# Patient Record
Sex: FEMALE | Race: WHITE | NOT HISPANIC OR LATINO | Employment: FULL TIME | URBAN - NONMETROPOLITAN AREA
[De-identification: names, ages, dates, MRNs, and addresses within clinical notes are randomized per-mention and may not be internally consistent; named-entity substitution may affect disease eponyms.]

---

## 2017-01-19 ENCOUNTER — TRANSFERRED RECORDS (OUTPATIENT)
Dept: HEALTH INFORMATION MANAGEMENT | Facility: HOSPITAL | Age: 42
End: 2017-01-19

## 2017-02-12 ENCOUNTER — HOSPITAL ENCOUNTER (EMERGENCY)
Facility: HOSPITAL | Age: 42
Discharge: HOME | End: 2017-02-12
Attending: NURSE PRACTITIONER | Admitting: NURSE PRACTITIONER
Payer: MEDICAID

## 2017-02-12 VITALS
DIASTOLIC BLOOD PRESSURE: 67 MMHG | SYSTOLIC BLOOD PRESSURE: 102 MMHG | RESPIRATION RATE: 16 BRPM | OXYGEN SATURATION: 98 % | TEMPERATURE: 97.7 F

## 2017-02-12 DIAGNOSIS — G43.909 MIGRAINE WITHOUT STATUS MIGRAINOSUS, NOT INTRACTABLE, UNSPECIFIED MIGRAINE TYPE: ICD-10-CM

## 2017-02-12 PROCEDURE — 25000131 ZZH RX MED GY IP 250 OP 636 PS 637: Performed by: NURSE PRACTITIONER

## 2017-02-12 PROCEDURE — 96372 THER/PROPH/DIAG INJ SC/IM: CPT

## 2017-02-12 PROCEDURE — 99213 OFFICE O/P EST LOW 20 MIN: CPT | Performed by: NURSE PRACTITIONER

## 2017-02-12 PROCEDURE — 99214 OFFICE O/P EST MOD 30 MIN: CPT | Mod: 25

## 2017-02-12 RX ORDER — ONDANSETRON 4 MG/1
4 TABLET, ORALLY DISINTEGRATING ORAL ONCE
Status: DISCONTINUED | OUTPATIENT
Start: 2017-02-12 | End: 2017-02-12

## 2017-02-12 RX ORDER — SUMATRIPTAN 6 MG/.5ML
6 INJECTION, SOLUTION SUBCUTANEOUS ONCE
Status: COMPLETED | OUTPATIENT
Start: 2017-02-12 | End: 2017-02-12

## 2017-02-12 RX ADMIN — SUMATRIPTAN 6 MG: 6 INJECTION, SOLUTION SUBCUTANEOUS at 15:42

## 2017-02-12 NOTE — DISCHARGE INSTRUCTIONS
* Migraine Headache  Migraine headaches are related to changes in blood flow to the brain. This causes throbbing or constant pain on one or both sides of the head. The pain may last from a few hours to several days. There is usually nausea, vomiting, sensitivity to light and sound, and blurred vision. A migraine attack may be triggered by emotional stress, hormone changes during the menstrual cycle, oral contraceptives, alcohol use, certain foods containing tyramine, eye strain, weather changes, missing meals, or too little or too much sleep.  Home Care For This Headache:  1) If you were given pain medicine for this headache, do not drive yourself home . Arrange for a ride, instead. When you get home, try to sleep. You should feel much better when you wake up.  2) Migraine headaches may improve with an ice pack on the forehead or at the base of the skull. Heat to the back of your neck may relieve any neck spasm.  3) Drink only clear liquids or eat a very light diet to avoid nausea/vomiting until symptoms improve.  Preventing Future Headaches:  1) Pay attention to those factors that seem to trigger your headache. Try to avoid them when you can. If you have frequent headaches, it is useful to keep a diary of what you were doing, feeling or eating in the hours before each attack. Show this to your doctor to help find the cause of your headaches.  a) If you feel that stress is a factor in your headaches, look at the sources of stress in your life. Find ways to release the build-up of those stresses by using regular exercise, relaxation methods (yoga, meditation), bio-feedback or simply taking time-out for yourself. For more information about this, consult your doctor or go to a local bookstore and review books and tapes on this subject.  b) Tyramine is a substance present in the following foods : chocolate, yogurt, all cheeses except cottage cheese and cream cheese. smoked or pickled fish and meat (including herring,  caviar, bologna, pepperoni, salami), liver, avocados, bananas, figs, raisins, and red wine. Be aware that these foods may trigger a migraine in some persons. Try taking these foods out of your diet for 1-2 months to see if this reduces headache frequency.  Treating Future Attacks:  1) At the first sign of a migraine headache, take a medicine to stop it if one has been prescribed for you. If not, take acetaminophen (Tylenol) or ibuprofen (Motrin, Advil) if you are able to take these. The sooner you take medicine, the better it will work.  2) You may also want to find a quiet, dark, comfortable place to sit or lie down. Let yourself relax or sleep.  3) An ice pack on the forehead or area of greatest pain may also help.  Follow Up  with your doctor if the headache is not better within the next 24 hours. If you have frequent headaches you should discuss a treatment plan with your primary care doctor. Ask if you can have medicine to take at home the next time you get a bad headache. Poorly controlled chronic headaches may require a referral to a neurologist (headache specialist).  Get Prompt Medical Attention  if any of the following occur:    Your head pain gets worse, or does not improve within 24 hours    Repeated vomiting (can t keep liquids down)    Sinus or ear or throat pain (not already reported)    Fever of 101  F (38.3  C) or higher, or as directed by your healthcare provider    Stiff neck    Extreme drowsiness, confusion or fainting    Weakness of an arm or leg or one side of the face    Difficulty with speech or vision    5891-0106 [employer]. [address] [place] [address], [place] [address]. All rights reserved. This information is not intended as a substitute for professional medical care. Always follow your healthcare professional's instructions.

## 2017-02-12 NOTE — ED AVS SNAPSHOT
[facility]    [address]    [place] [address]    Phone:  [phone]                                       Venu Amaya   MRN: [id number]    Department:  [facility]   Date of Visit:  2/12/2017           Patient Information     Date Of Birth          1975        Your diagnoses for this visit were:     Migraine without status migrainosus, not intractable, unspecified migraine type        You were seen by Kay Betts NP.        Discharge Instructions         * Migraine Headache  Migraine headaches are related to changes in blood flow to the brain. This causes throbbing or constant pain on one or both sides of the head. The pain may last from a few hours to several days. There is usually nausea, vomiting, sensitivity to light and sound, and blurred vision. A migraine attack may be triggered by emotional stress, hormone changes during the menstrual cycle, oral contraceptives, alcohol use, certain foods containing tyramine, eye strain, weather changes, missing meals, or too little or too much sleep.  Home Care For This Headache:  1) If you were given pain medicine for this headache, do not drive yourself home . Arrange for a ride, instead. When you get home, try to sleep. You should feel much better when you wake up.  2) Migraine headaches may improve with an ice pack on the forehead or at the base of the skull. Heat to the back of your neck may relieve any neck spasm.  3) Drink only clear liquids or eat a very light diet to avoid nausea/vomiting until symptoms improve.  Preventing Future Headaches:  1) Pay attention to those factors that seem to trigger your headache. Try to avoid them when you can. If you have frequent headaches, it is useful to keep a diary of what you were doing, feeling or eating in the hours before each attack. Show this to your doctor to help find the cause of your headaches.  a) If you feel that stress is a factor in your headaches,  look at the sources of stress in your life. Find ways to release the build-up of those stresses by using regular exercise, relaxation methods (yoga, meditation), bio-feedback or simply taking time-out for yourself. For more information about this, consult your doctor or go to a local bookstore and review books and tapes on this subject.  b) Tyramine is a substance present in the following foods : chocolate, yogurt, all cheeses except cottage cheese and cream cheese. smoked or pickled fish and meat (including herring, caviar, bologna, pepperoni, salami), liver, avocados, bananas, figs, raisins, and red wine. Be aware that these foods may trigger a migraine in some persons. Try taking these foods out of your diet for 1-2 months to see if this reduces headache frequency.  Treating Future Attacks:  1) At the first sign of a migraine headache, take a medicine to stop it if one has been prescribed for you. If not, take acetaminophen (Tylenol) or ibuprofen (Motrin, Advil) if you are able to take these. The sooner you take medicine, the better it will work.  2) You may also want to find a quiet, dark, comfortable place to sit or lie down. Let yourself relax or sleep.  3) An ice pack on the forehead or area of greatest pain may also help.  Follow Up  with your doctor if the headache is not better within the next 24 hours. If you have frequent headaches you should discuss a treatment plan with your primary care doctor. Ask if you can have medicine to take at home the next time you get a bad headache. Poorly controlled chronic headaches may require a referral to a neurologist (headache specialist).  Get Prompt Medical Attention  if any of the following occur:    Your head pain gets worse, or does not improve within 24 hours    Repeated vomiting (can t keep liquids down)    Sinus or ear or throat pain (not already reported)    Fever of 101  F (38.3  C) or higher, or as directed by your healthcare provider    Stiff  neck    Extreme drowsiness, confusion or fainting    Weakness of an arm or leg or one side of the face    Difficulty with speech or vision    0474-6839 [employer]. [address] [place] [address], [place] [address]. All rights reserved. This information is not intended as a substitute for professional medical care. Always follow your healthcare professional's instructions.             Review of your medicines      Our records show that you are taking the medicines listed below. If these are incorrect, please call your family doctor or clinic.        Dose / Directions Last dose taken    acetaminophen-codeine 300-30 MG per tablet   Commonly known as:  TYLENOL/codeine #3   Quantity:  60 tablet        1-2 tabs up to twice daily as needed.   Refills:  0        ibuprofen 800 MG tablet   Commonly known as:  ADVIL/MOTRIN   Quantity:  60 tablet        TAKE 1 TABLET EVERY 8 HOURS AS NEEDED FOR MODERATE PAIN   Refills:  5        MULTIVITAMIN ADULT PO        Take by mouth daily   Refills:  0        ondansetron 4 MG ODT tab   Commonly known as:  ZOFRAN-ODT   Dose:  4 mg   Quantity:  10 tablet        Take 1 tablet (4 mg) by mouth every 8 hours as needed for nausea   Refills:  0        sertraline 50 MG tablet   Commonly known as:  ZOLOFT   Dose:  50 mg   Quantity:  30 tablet        Take 1 tablet (50 mg) by mouth daily   Refills:  1        SUMAtriptan 6 MG/0.5ML injection   Commonly known as:  IMITREX   Dose:  6 mg   Quantity:  5 vial        Inject 0.5 mLs (6 mg) Subcutaneous every 12 hours as needed for migraine   Refills:  0                Orders Needing Specimen Collection     None      Pending Results     No orders found from 2/10/2017 to 2/13/2017.            Pending Culture Results     No orders found from 2/10/2017 to 2/13/2017.            Thank you for choosing [facility]       Thank you for choosing [facility] for your care. Our goal is always to provide you with excellent care. Hearing back from our patients is one way  "we can continue to improve our services. Please take a few minutes to complete the written survey that you may receive in the mail after you visit with us. Thank you!        [employer] Information     [employer] lets you send messages to your doctor, view your test results, renew your prescriptions, schedule appointments and more. To sign up, go to [email or web address] . Click on \"Log in\" on the left side of the screen, which will take you to the Welcome page. Then click on \"Sign up Now\" on the right side of the page.     You will be asked to enter the access code listed below, as well as some personal information. Please follow the directions to create your username and password.     Your access code is: [email or web address]  Expires: 2017 11:52 AM     Your access code will  in 90 days. If you need help or a new code, please call your [facility] clinic or [phone].        Care EveryWhere ID     This is your Care EveryWhere ID. This could be used by other organizations to access your [facility] medical records  [id number]        After Visit Summary       This is your record. Keep this with you and show to your community pharmacist(s) and doctor(s) at your next visit.                  "

## 2017-02-12 NOTE — ED AVS SNAPSHOT
[facility]    [address] [address]    Phone:  [phone]                                       Venu Amaya   MRN: [id number]    Department:  [facility]   Date of Visit:  2/12/2017           After Visit Summary Signature Page     I have received my discharge instructions, and my questions have been answered. I have discussed any challenges I see with this plan with the nurse or doctor.    ..........................................................................................................................................  Patient/Patient Representative Signature      ..........................................................................................................................................  Patient Representative Print Name and Relationship to Patient    ..................................................               ................................................  Date                                            Time    ..........................................................................................................................................  Reviewed by Signature/Title    ...................................................              ..............................................  Date                                                            Time

## 2017-02-13 ENCOUNTER — HOSPITAL ENCOUNTER (EMERGENCY)
Facility: HOSPITAL | Age: 42
Discharge: HOME | End: 2017-02-13
Attending: FAMILY MEDICINE | Admitting: FAMILY MEDICINE
Payer: MEDICAID

## 2017-02-13 VITALS
HEART RATE: 80 BPM | SYSTOLIC BLOOD PRESSURE: 109 MMHG | RESPIRATION RATE: 16 BRPM | OXYGEN SATURATION: 98 % | DIASTOLIC BLOOD PRESSURE: 70 MMHG | TEMPERATURE: 98.5 F

## 2017-02-13 PROCEDURE — 99283 EMERGENCY DEPT VISIT LOW MDM: CPT | Performed by: FAMILY MEDICINE

## 2017-02-13 PROCEDURE — 25000131 ZZH RX MED GY IP 250 OP 636 PS 637: Performed by: FAMILY MEDICINE

## 2017-02-13 PROCEDURE — 99283 EMERGENCY DEPT VISIT LOW MDM: CPT

## 2017-02-13 RX ORDER — SUMATRIPTAN 6 MG/.5ML
6 INJECTION, SOLUTION SUBCUTANEOUS ONCE
Status: COMPLETED | OUTPATIENT
Start: 2017-02-13 | End: 2017-02-13

## 2017-02-13 RX ADMIN — SUMATRIPTAN SUCCINATE 6 MG: 6 INJECTION SUBCUTANEOUS at 09:28

## 2017-02-13 ASSESSMENT — ENCOUNTER SYMPTOMS
WEAKNESS: 0
FACIAL ASYMMETRY: 0
FATIGUE: 0
SEIZURES: 0
CHILLS: 0
DIZZINESS: 0
HEADACHES: 1
SPEECH DIFFICULTY: 0
PHOTOPHOBIA: 1
APPETITE CHANGE: 0
FEVER: 0
NUMBNESS: 0
LIGHT-HEADEDNESS: 0

## 2017-02-13 NOTE — ED NOTES
PAtient appears to have left; name band is on the bed and all belongings and patient are gone; MARTÍNEZ Servin notified; MD notified

## 2017-02-13 NOTE — ED NOTES
Presents to the ED alone. States she was here yesterday with same s/sx of headache on left side of hear with piercing and throbbing pain.  Rates a 10/10.  Took zofran at home and no further nausea.  Requests a shot of imitrex. States she has chronic migraines and will be starting on botox next month.  States she is in the middle of her cycle and they are worse at present.  Assessment complete.  Call light is maximo.

## 2017-02-13 NOTE — ED NOTES
Patient presents today for headache and states she needs a shot of immitrex.  Currently patient states she is not nauseated as she took a zofran at home.

## 2017-02-13 NOTE — ED NOTES
Pt no longer in room and has left with no discharge orders.  Last seen at 1010.  Dr Lindsay is updated and aware pt gone.  When nurse last spoke with pt she stated she was feeling better.

## 2017-02-13 NOTE — ED PROVIDER NOTES
History     Chief Complaint   Patient presents with     Headache     /c/o migraine h/a, notes gets imitrex but unable due to insurance lapse     The history is provided by the patient. No  was used.     Venu Amaya is a 41 year old female who presents today with a CC of migraine headache.  Long history of migraine headache, has used Imitrex successfully in past with resolution.  She denies thunderclap onset, denies worst headache ever, no recent illness or injury.  No vision changes or weakness.  Requesting Imitrex.       I have reviewed the Medications, Allergies, Past Medical and Surgical History, and Social History in the Epic system.    Review of Systems   Constitutional: Negative for appetite change, chills, fatigue and fever.   Eyes: Positive for photophobia. Negative for visual disturbance.   Neurological: Positive for headaches. Negative for dizziness, seizures, syncope, facial asymmetry, speech difficulty, weakness, light-headedness and numbness.       Physical Exam   BP: 102/67  Heart Rate: 76  Temp: 97.7  F (36.5  C)  Resp: 16  SpO2: 98 %    Physical Exam   Constitutional: She is oriented to person, place, and time. She appears well-developed and well-nourished.   Laying on exam room table with covers pulled up over head   HENT:   Head: Normocephalic and atraumatic.   Right Ear: Tympanic membrane, external ear and ear canal normal.   Left Ear: Tympanic membrane, external ear and ear canal normal.   Mouth/Throat: Uvula is midline and oropharynx is clear and moist.   Eyes: Conjunctivae are normal.   Neck: Normal range of motion. Neck supple.   Cardiovascular: Normal rate and regular rhythm.    Pulmonary/Chest: Effort normal and breath sounds normal.   Lymphadenopathy:     She has no cervical adenopathy.   Neurological: She is alert and oriented to person, place, and time. No cranial nerve deficit (cranial nerves 2-12 grossly intact). Coordination normal.   Skin: Skin is warm and  dry.   Psychiatric: She has a normal mood and affect. Her behavior is normal.   Nursing note and vitals reviewed.      ED Course     ED Course     Procedures    Medications   SUMAtriptan (IMITREX) injection 6 mg (6 mg Subcutaneous Given 2/12/17 1542)     Observed for a minimum of 20 minutes, tolerated medications well, no adverse efects noted, reports pain is 3/10 on discharge.     Assessments & Plan (with Medical Decision Making)     I have reviewed the nursing notes.    I have reviewed the findings, diagnosis, plan and need for follow up with the patient.    Patient reports migraine symptoms are same as typical symptoms, headache pain is 3/10 post medication.  Exam is reassuring.  Patient is discharged in stable condition.  She reports she has an appointment scheduled with Neurology for more definitive migraine treatment.      ASSESSMENT / PLAN:  (G43.909) Migraine without status migrainosus, not intractable, unspecified migraine type  Comment: symptoms improved with imitrex  Plan:  Patient verbally educated and given appropriate education sheets for their diagnoses and has no questions.   Take medications as directed.    Return to ED/UC if symptoms increase or concerns develop: red flag symptoms as discussed and per discharge instructions   Follow up with your Primary Care provider if symptoms do not improve      New Prescriptions    No medications on file       Final diagnoses:   Migraine without status migrainosus, not intractable, unspecified migraine type       2/12/2017   [facility]     Kay Betts NP  02/13/17 1044

## 2017-02-20 NOTE — ED PROVIDER NOTES
"eMERGENCY dEPARTMENT eNCOUnter        CHIEF COMPLAINT    Chief Complaint   Patient presents with     Headache     wants a shot of imitrex       HPI    Venu Amaya is a 41 year old female who presents with gradual onset of a headache since yesterday.  She has a history of migraines, came to  yesterday as her insurance lapsed and she had run oout of imitrex, which works well for her.  She received that and was discharged to home.  She states the headache\"almost\" went away but is back today. The quality of the headache is migraine. This headache is not the worst headache of the patient's life.  Pain is localized in the top  of the head.  The patient has associated nausea but no vomiting. The pain worsens with etopxposure to bright light.    REVIEW OF SYSTEMS    Neurologic: No LOC or stiff neck  Cardiac: No Chest Pain, No syncope  Respiratory: No cough or difficulty breathing  GI: No Bloody Stool or Diarrhea  : No Dysuria or Hematuria  General: No Fever  All other systems reviewed and are negative.    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/2005     Christa Tabares MD      Inguinal hernia without mention of obstruction or gangrene, unilateral or unspecified, (not specified as recurrent) 06/16/2008     Irregular menstrual cycle 05/25/2011     Mental disorders of mother, postpartum 10/06/2005     Migraine, unspecified, without mention of intractable migraine without mention of status migrainosus 01/15/2003     Moderate dysplasia of cervix 11/03/2011     Pain in thoracic spine " 11/03/2011     Scoliosis (and kyphoscoliosis), idiopathic 05/16/2011     Abdiel Nolan MD      Past Surgical History   Procedure Laterality Date     Inguinal hernia repair, paul repair  10/14/2008     RT      Tubal ligation         CURRENT MEDICATIONS    Current Outpatient Rx   Medication Sig Dispense Refill     SUMAtriptan (IMITREX) 6 MG/0.5ML vial Inject 0.5 mLs (6 mg) Subcutaneous every 12 hours as needed for migraine 5 vial 0     ondansetron (ZOFRAN-ODT) 4 MG disintegrating tablet Take 1 tablet (4 mg) by mouth every 8 hours as needed for nausea 10 tablet 0     Multiple Vitamins-Minerals (MULTIVITAMIN ADULT PO) Take by mouth daily       ibuprofen (ADVIL,MOTRIN) 800 MG tablet TAKE 1 TABLET EVERY 8 HOURS AS NEEDED FOR MODERATE PAIN 60 tablet 5     acetaminophen-codeine (TYLENOL/CODEINE #3) 300-30 MG per tablet 1-2 tabs up to twice daily as needed. 60 tablet 0     sertraline (ZOLOFT) 50 MG tablet Take 1 tablet (50 mg) by mouth daily 30 tablet 1     [DISCONTINUED] PRENATAL VITAMINS PO Take 1 tablet by mouth daily.         ALLERGIES    Allergies   Allergen Reactions     Amoxicillin Swelling     Hands swell      Levofloxacin Other (See Comments)     Unknown reaction - Levaquin      No Clinical Screening - See Comments      utapar drop in blood pressure     Penicillins      Ritodrine        SOCIAL & FAMILY HISTORY    Social History     Social History     Marital status:      Spouse name: N/A     Number of children: N/A     Years of education: N/A     Social History Main Topics     Smoking status: Current Every Day Smoker     Packs/day: 0.25     Types: Cigarettes     Last attempt to quit: 7/20/2015     Smokeless tobacco: Never Used      Comment: Passive Exposure: Yes      Alcohol use No     Drug use: No     Sexual activity: Not on file     Other Topics Concern     Blood Transfusions Yes     Caffeine Concern Yes     Coffee, 4 cups daily      Exercise Yes     Turbojam, 2-3 times/week      Parent/Sibling W/  Cabg, Mi Or Angioplasty Before 65f 55m? No     Social History Narrative     Family History   Problem Relation Age of Onset     CANCER Mother      CANCER Other      CANCER Other        PHYSICAL EXAM    VITAL SIGNS: /70  Pulse 80  Temp 98.5  F (36.9  C) (Tympanic)  Resp 16  SpO2 98%   Constitutional:  Well developed, well nourished, no acute distress   Eyes: Pupils equally round and react to light, extraocular movement are intact  Vascular: No temporal artery tenderness  HENT:  Atraumatic, moist mucus membranes, airway patent  Neck: supple, no JVD   Respiratory:  Lungs Clear, no retractions   Cardiovascular:  Reg rate, no murmurs  GI:  Soft, nontender, normal bowel sounds  Musculoskeletal:  No edema, no acute deformities  Integument:  Well hydrated, no petechiae   Neurologic:  Alert & oriented, no slurred speech, normal gross motor, normal finger to nose test bilaterally, patellar reflexes equal bilaterally  Psych: Pleasant affect, no hallucinations    ED COURSE & MEDICAL DECISION MAKING    See chart for details of medications given during the ED stay.  Reevaluation: after medications, the patients symptoms are much improved.    Vitals:    02/13/17 0821   BP: 109/70   Pulse: 80   Resp: 16   Temp: 98.5  F (36.9  C)   TempSrc: Tympanic   SpO2: 98%           FINAL IMPRESSION    Acute on Chronic Migraine Headache    PLAN  The patient received imitrex, then left AMA while I was contacting Dr. Segovia about follow up in the clinic.  It is unknown if patient is aware they have it at the clinic.  There was also a lapse in her insurance, which social work identified but she left before this could be completed.         Elle Lindsay MD  02/20/17 8903

## 2017-03-29 ENCOUNTER — HOSPITAL ENCOUNTER (EMERGENCY)
Facility: HOSPITAL | Age: 42
Discharge: HOME | End: 2017-03-29
Attending: NURSE PRACTITIONER | Admitting: NURSE PRACTITIONER
Payer: MEDICAID

## 2017-03-29 VITALS
OXYGEN SATURATION: 99 % | TEMPERATURE: 99 F | RESPIRATION RATE: 16 BRPM | SYSTOLIC BLOOD PRESSURE: 96 MMHG | DIASTOLIC BLOOD PRESSURE: 63 MMHG

## 2017-03-29 DIAGNOSIS — K04.7 DENTAL ABSCESS: ICD-10-CM

## 2017-03-29 PROCEDURE — 99213 OFFICE O/P EST LOW 20 MIN: CPT | Performed by: NURSE PRACTITIONER

## 2017-03-29 PROCEDURE — 99213 OFFICE O/P EST LOW 20 MIN: CPT

## 2017-03-29 RX ORDER — AZITHROMYCIN 250 MG/1
TABLET, FILM COATED ORAL
Qty: 6 TABLET | Refills: 0 | Status: SHIPPED | OUTPATIENT
Start: 2017-03-29 | End: 2017-05-08

## 2017-03-29 RX ORDER — PREDNISONE 20 MG/1
20 TABLET ORAL DAILY
Qty: 5 TABLET | Refills: 0 | Status: SHIPPED | OUTPATIENT
Start: 2017-03-29 | End: 2017-04-03

## 2017-03-29 ASSESSMENT — ENCOUNTER SYMPTOMS
NAUSEA: 0
APPETITE CHANGE: 0
VOMITING: 0
DYSURIA: 0
ACTIVITY CHANGE: 0
TROUBLE SWALLOWING: 0
PSYCHIATRIC NEGATIVE: 1
DIARRHEA: 0
CHILLS: 1
FEVER: 1

## 2017-03-29 NOTE — ED AVS SNAPSHOT
[facility]    [address] [address]    Phone:  [phone]                                       Venu Amaya   MRN: [id number]    Department:  [facility]   Date of Visit:  3/29/2017           After Visit Summary Signature Page     I have received my discharge instructions, and my questions have been answered. I have discussed any challenges I see with this plan with the nurse or doctor.    ..........................................................................................................................................  Patient/Patient Representative Signature      ..........................................................................................................................................  Patient Representative Print Name and Relationship to Patient    ..................................................               ................................................  Date                                            Time    ..........................................................................................................................................  Reviewed by Signature/Title    ...................................................              ..............................................  Date                                                            Time

## 2017-03-29 NOTE — ED AVS SNAPSHOT
[facility]    [address] [address]    Phone:  [phone]                                       Venu Amaya   MRN: [id number]    Department:  [facility]   Date of Visit:  3/29/2017           Patient Information     Date Of Birth          1975        Your diagnoses for this visit were:     Dental abscess        You were seen by Irene Gresham NP.      Follow-up Information     Follow up with Robin Segovia MD.    Specialty:  Family Practice    Why:  As needed, If symptoms worsen    Contact information:    [facility]  [address] [facility] [address] [phone]          Follow up with [facility].    Specialty:  EMERGENCY MEDICINE    Why:  As needed, If symptoms worsen    Contact information:    [address] [address] [phone]    Additional information:    From [facility]: Take [address]. Turn left at [address]. Turn left at the first stoplight on [address]. At the first stop sign, take a right onto [address]. Take a left into the parking lot and continue through until you reach the North enterance of the building.       From [place]: Take [address]. Take the [address] ramp towards [place]. Turn left onto [address]. Take a slight right onto [address]. Turn left at the first stoplight on [address]. At the first stop sign, take a right onto [address]. Take a left into the parking lot and continue through until you reach the North enterance of the building.       From [place]: Take [address]. Take a right at [address]. At the first stop sign, take a right onto [address]. Take a left into the parking lot and continue through until you reach the North enterance of the building.         Discharge Instructions       Take antibiotics as ordered.   Eat a yogurt a day while taking antibiotics.   Take tylenol  and or ibuprofen for pain. Follow dosing on bottle.   Increase fluid intake.   List of dentists provided. Follow up with dentist ASAP.   Follow up with PCP with any increase in symptoms or concerns.   Return to urgent care or emergency department with any increase in symptoms or concerns.     Discharge References/Attachments     ABSCESS, DENTAL (ENGLISH)         Review of your medicines      START taking        Dose / Directions Last dose taken    azithromycin 250 MG tablet   Commonly known as:  ZITHROMAX Z-RICKEY   Quantity:  6 tablet        Two tablets on the first day, then one tablet daily for the next 4 days   Refills:  0        predniSONE 20 MG tablet   Commonly known as:  DELTASONE   Dose:  20 mg   Quantity:  5 tablet        Take 1 tablet (20 mg) by mouth daily for 5 days   Refills:  0          Our records show that you are taking the medicines listed below. If these are incorrect, please call your family doctor or clinic.        Dose / Directions Last dose taken    acetaminophen-codeine 300-30 MG per tablet   Commonly known as:  TYLENOL/codeine #3   Quantity:  60 tablet        1-2 tabs up to twice daily as needed.   Refills:  0        CELEXA PO   Dose:  20 mg        Take 20 mg by mouth daily   Refills:  0        ibuprofen 800 MG tablet   Commonly known as:  ADVIL/MOTRIN   Quantity:  60 tablet        TAKE 1 TABLET EVERY 8 HOURS AS NEEDED FOR MODERATE PAIN   Refills:  5        MULTIVITAMIN ADULT PO        Take by mouth daily   Refills:  0        ondansetron 4 MG ODT tab   Commonly known as:  ZOFRAN-ODT   Dose:  4 mg   Quantity:  10 tablet        Take 1 tablet (4 mg) by mouth every 8 hours as needed for nausea   Refills:  0        SUMAtriptan 6 MG/0.5ML injection   Commonly known as:  IMITREX   Dose:  6 mg   Quantity:  5 vial        Inject 0.5 mLs (6 mg) Subcutaneous every 12 hours as needed for migraine   Refills:  0                Prescriptions were sent or printed at these locations (2 Prescriptions)             "       [facility] - [place], [place] - [address] AT [facility] [address]   [address], [place] [address]    Telephone:  [phone]   Fax:  [phone]   Hours:                  E-Prescribed (2 of 2)         azithromycin (ZITHROMAX Z-RICKEY) 250 MG tablet               predniSONE (DELTASONE) 20 MG tablet                Orders Needing Specimen Collection     None      Pending Results     No orders found from 3/27/2017 to 3/30/2017.            Pending Culture Results     No orders found from 3/27/2017 to 3/30/2017.            Thank you for choosing [facility]       Thank you for choosing [facility] for your care. Our goal is always to provide you with excellent care. Hearing back from our patients is one way we can continue to improve our services. Please take a few minutes to complete the written survey that you may receive in the mail after you visit with us. Thank you!        [employer] Information     [employer] lets you send messages to your doctor, view your test results, renew your prescriptions, schedule appointments and more. To sign up, go to [email or web address] . Click on \"Log in\" on the left side of the screen, which will take you to the Welcome page. Then click on \"Sign up Now\" on the right side of the page.     You will be asked to enter the access code listed below, as well as some personal information. Please follow the directions to create your username and password.     Your access code is: [email or web address]  Expires: 2017  3:23 PM     Your access code will  in 90 days. If you need help or a new code, please call your [facility] clinic or [phone].        Care EveryWhere ID     This is your Care EveryWhere ID. This could be used by other organizations to access your [facility] medical records  [id number]        After Visit Summary       This is your record. Keep this with you and show to your community pharmacist(s) and doctor(s) at your next visit.                  "

## 2017-03-29 NOTE — ED PROVIDER NOTES
History     Chief Complaint   Patient presents with     Dental Pain     lower right side tooth broke at least 6 months ago, was abcessed and received antibiotics, has been unable to see an oral surgeon, and now this morning has pain in that area and swelling. Rates pain 8/10.     The history is provided by the patient. No  was used.     Venu Amaya is a 41 year old female who presents with right lower dental pain. Her right bottom tooth broke 6 months ago. Increase in pain this am. She has taken ibuprofen with mild effectiveness. Positive for fever and chills. Denies night sweats. Eating and drinking well. Bowel and bladder are working well.     I have reviewed the Medications, Allergies, Past Medical and Surgical History, and Social History in the Epic system.    Review of Systems   Constitutional: Positive for chills and fever. Negative for activity change and appetite change.   HENT: Positive for dental problem. Negative for trouble swallowing.    Gastrointestinal: Negative for diarrhea, nausea and vomiting.   Genitourinary: Negative for dysuria.   Skin: Negative for rash.   Psychiatric/Behavioral: Negative.        Physical Exam   BP: 96/63  Heart Rate: 79  Temp: 99  F (37.2  C)  Resp: 16  SpO2: 99 %  Physical Exam   Constitutional: She is oriented to person, place, and time. She appears well-developed and well-nourished. No distress.   HENT:   Head: Normocephalic.   Mouth/Throat: Uvula is midline, oropharynx is clear and moist and mucous membranes are normal. Dental caries present. No uvula swelling. No oropharyngeal exudate.       Tooth #30 broken at gumline with erythema and swelling along the gumline. Negative TMJ.    Neck: Normal range of motion. Neck supple.   Cardiovascular: Normal rate, regular rhythm and normal heart sounds.    No murmur heard.  Pulmonary/Chest: Effort normal. No respiratory distress. She has no wheezes. She has no rales.   Abdominal: Soft. She exhibits no  distension.   Musculoskeletal: Normal range of motion.   Lymphadenopathy:     She has no cervical adenopathy.   Neurological: She is alert and oriented to person, place, and time.   Skin: Skin is warm and dry. No rash noted. She is not diaphoretic.   Psychiatric: She has a normal mood and affect. Her behavior is normal.   Nursing note and vitals reviewed.      ED Course     ED Course     Procedures      Assessments & Plan (with Medical Decision Making)     She has an allergy to PCN's. Discussed Clindamycin and she feels she has reacted to Clindamycin in the past. She has been on Azithromycin previously for dental infections and Azithromycin has worked well for her.     Discussed plan of care. She verbalized understanding. All questions answered.     I have reviewed the nursing notes.    I have reviewed the findings, diagnosis, plan and need for follow up with the patient.  Discharged in stable condition.     Discharge Medication List as of 3/29/2017  3:23 PM      START taking these medications    Details   azithromycin (ZITHROMAX Z-RICKEY) 250 MG tablet Two tablets on the first day, then one tablet daily for the next 4 days, Disp-6 tablet, R-0, E-Prescribe      predniSONE (DELTASONE) 20 MG tablet Take 1 tablet (20 mg) by mouth daily for 5 days, Disp-5 tablet, R-0, E-Prescribe             Final diagnoses:   Dental abscess     Take antibiotics as ordered.   Eat a yogurt a day while taking antibiotics.   Take tylenol and or ibuprofen for pain. Follow dosing on bottle.   Increase fluid intake.   List of dentists provided. Follow up with dentist ASAP.   Follow up with PCP with any increase in symptoms or concerns.   Return to urgent care or emergency department with any increase in symptoms or concerns.     ALFONSO Greene  3/29/2017  2:58 PM  URGENT CARE CLINIC       Irene Gresham NP  04/04/17 0710

## 2017-03-29 NOTE — DISCHARGE INSTRUCTIONS
Take antibiotics as ordered.   Eat a yogurt a day while taking antibiotics.   Take tylenol and or ibuprofen for pain. Follow dosing on bottle.   Increase fluid intake.   List of dentists provided. Follow up with dentist ASAP.   Follow up with PCP with any increase in symptoms or concerns.   Return to urgent care or emergency department with any increase in symptoms or concerns.

## 2017-03-29 NOTE — ED NOTES
Reports had a broken tooth on bottom right side 6 months ago, was given antibiotics for an abscess woke up this morning with swollen face and intense pain 8/10 took 800 mg ibuprofen today

## 2017-05-08 ENCOUNTER — HOSPITAL ENCOUNTER (EMERGENCY)
Facility: HOSPITAL | Age: 42
Discharge: HOME | End: 2017-05-08
Attending: NURSE PRACTITIONER | Admitting: NURSE PRACTITIONER
Payer: COMMERCIAL

## 2017-05-08 VITALS
TEMPERATURE: 97.9 F | DIASTOLIC BLOOD PRESSURE: 70 MMHG | OXYGEN SATURATION: 100 % | HEART RATE: 79 BPM | RESPIRATION RATE: 16 BRPM | SYSTOLIC BLOOD PRESSURE: 105 MMHG

## 2017-05-08 DIAGNOSIS — G43.109 MIGRAINE WITH AURA AND WITHOUT STATUS MIGRAINOSUS, NOT INTRACTABLE: ICD-10-CM

## 2017-05-08 DIAGNOSIS — K04.7 DENTAL INFECTION: ICD-10-CM

## 2017-05-08 PROCEDURE — 64400 NJX AA&/STRD TRIGEMINAL NRV: CPT | Performed by: NURSE PRACTITIONER

## 2017-05-08 PROCEDURE — 64400 NJX AA&/STRD TRIGEMINAL NRV: CPT

## 2017-05-08 PROCEDURE — 96372 THER/PROPH/DIAG INJ SC/IM: CPT | Mod: 59

## 2017-05-08 PROCEDURE — 99212 OFFICE O/P EST SF 10 MIN: CPT | Mod: 25

## 2017-05-08 PROCEDURE — 99213 OFFICE O/P EST LOW 20 MIN: CPT | Mod: 25 | Performed by: NURSE PRACTITIONER

## 2017-05-08 PROCEDURE — 25000131 ZZH RX MED GY IP 250 OP 636 PS 637

## 2017-05-08 RX ORDER — SUMATRIPTAN 6 MG/.5ML
6 INJECTION, SOLUTION SUBCUTANEOUS ONCE
Status: COMPLETED | OUTPATIENT
Start: 2017-05-08 | End: 2017-05-08

## 2017-05-08 RX ORDER — AZITHROMYCIN 250 MG/1
TABLET, FILM COATED ORAL
Qty: 6 TABLET | Refills: 0 | Status: SHIPPED | OUTPATIENT
Start: 2017-05-08 | End: 2017-06-08

## 2017-05-08 RX ORDER — SUMATRIPTAN 6 MG/.5ML
INJECTION, SOLUTION SUBCUTANEOUS
Status: COMPLETED
Start: 2017-05-08 | End: 2017-05-08

## 2017-05-08 RX ADMIN — SUMATRIPTAN SUCCINATE 6 MG: 6 INJECTION SUBCUTANEOUS at 09:41

## 2017-05-08 RX ADMIN — SUMATRIPTAN 6 MG: 6 INJECTION, SOLUTION SUBCUTANEOUS at 09:41

## 2017-05-08 ASSESSMENT — ENCOUNTER SYMPTOMS
PSYCHIATRIC NEGATIVE: 1
NUMBNESS: 0
SINUS PRESSURE: 0
TROUBLE SWALLOWING: 0
NAUSEA: 0
FACIAL SWELLING: 0
COUGH: 0
DIZZINESS: 0
ACTIVITY CHANGE: 0
FACIAL ASYMMETRY: 0
VOMITING: 0
FEVER: 0
APPETITE CHANGE: 0
LIGHT-HEADEDNESS: 0
DYSURIA: 0
SORE THROAT: 0
SPEECH DIFFICULTY: 0
RHINORRHEA: 0
HEADACHES: 1
PHOTOPHOBIA: 1
WEAKNESS: 0
CHILLS: 0
DIARRHEA: 0

## 2017-05-08 NOTE — ED AVS SNAPSHOT
[facility]    [address] [address]    Phone:  [phone]                                       Venu Amaya   MRN: [id number]    Department:  [facility]   Date of Visit:  5/8/2017           After Visit Summary Signature Page     I have received my discharge instructions, and my questions have been answered. I have discussed any challenges I see with this plan with the nurse or doctor.    ..........................................................................................................................................  Patient/Patient Representative Signature      ..........................................................................................................................................  Patient Representative Print Name and Relationship to Patient    ..................................................               ................................................  Date                                            Time    ..........................................................................................................................................  Reviewed by Signature/Title    ...................................................              ..............................................  Date                                                            Time

## 2017-05-08 NOTE — ED PROVIDER NOTES
History     Chief Complaint   Patient presents with     Headache     Migraine started this am. Took zofran--no vomiting. Ran out of home imitrex that she uses at home     The history is provided by the patient. No  was used.     Venu Amaya is a 41 year old female who presents with a headache that started at 0300 this am. She has a history of migraines. She's having right lower dental pain that started to flare up 4 days ago. Denies head trauma or injury. She took Zofran, ibuprofen, and tylenol #3 with mild effectiveness of nausea. She ran out of her Imitrex and has a prescription waiting for her at the pharmacy as she was having an insurance conflict that [place] is sorting through for her. Denies fever, chills, or night sweats. Eating and drinking well. Bowel and bladder are working well.     I have reviewed the Medications, Allergies, Past Medical and Surgical History, and Social History in the Epic system.    Review of Systems   Constitutional: Negative for activity change, appetite change, chills and fever.   HENT: Positive for dental problem. Negative for congestion, ear discharge, ear pain, facial swelling, postnasal drip, rhinorrhea, sinus pressure, sore throat and trouble swallowing.    Eyes: Positive for photophobia. Negative for visual disturbance.   Respiratory: Negative for cough.    Gastrointestinal: Negative for diarrhea, nausea and vomiting.   Genitourinary: Negative for dysuria.   Skin: Negative for rash.   Neurological: Positive for headaches. Negative for dizziness, syncope, facial asymmetry, speech difficulty, weakness, light-headedness and numbness.        Pressure headache to her temporal region.    Psychiatric/Behavioral: Negative.        Physical Exam   BP: 105/70  Pulse: 79  Temp: 97.9  F (36.6  C)  Resp: 16  SpO2: 100 %  Physical Exam   Constitutional: She is oriented to person, place, and time. She appears well-developed and well-nourished. No distress.    HENT:   Head: Normocephalic.   Right Ear: External ear normal.   Left Ear: External ear normal.   Mouth/Throat: Uvula is midline, oropharynx is clear and moist and mucous membranes are normal. No trismus in the jaw. Dental caries present. No dental abscesses. No oropharyngeal exudate.       Tooth #30 with erythema along the lateral gumline and tooth broke at the gumline.    Eyes: Conjunctivae and EOM are normal. Pupils are equal, round, and reactive to light.   Neck: Normal range of motion. Neck supple.   Cardiovascular: Normal rate, regular rhythm, normal heart sounds and intact distal pulses.    No murmur heard.  Pulmonary/Chest: Effort normal. No respiratory distress. She has no wheezes. She has no rales.   Abdominal: Soft. She exhibits no distension.   Musculoskeletal: Normal range of motion.   Lymphadenopathy:     She has no cervical adenopathy.   Neurological: She is alert and oriented to person, place, and time. She displays normal reflexes. No cranial nerve deficit. She exhibits normal muscle tone. Coordination normal.   Skin: Skin is warm and dry. No rash noted. She is not diaphoretic.   Psychiatric: She has a normal mood and affect. Her behavior is normal.   Nursing note and vitals reviewed.      ED Course     ED Course     Trigeminal nerve block  Performed by: CORINE WHYTE  Authorized by: CORINE WHYTE   Consent: Verbal consent obtained.  Risks and benefits: risks, benefits and alternatives were discussed  Consent given by: patient  Patient understanding: patient states understanding of the procedure being performed  Patient identity confirmed: verbally with patient  Local anesthesia used: yes    Anesthesia:  Local anesthesia used: yes  Local Anesthetic: bupivacaine 0.5% with epinephrine   Anesthetic total (ml): 1.8 ml.  Sedation:  Patient sedated: no    Patient tolerance: Patient tolerated the procedure well with no immediate complications          Medications   SUMAtriptan (IMITREX)  injection 6 mg (6 mg Subcutaneous Given 5/8/17 0941)     Monitored for 20 minutes post injection and tolerated well.     Assessments & Plan (with Medical Decision Making)     Felt great relief from Imitrex. Pain 3/10 and she is satisfied. She was happy with the dental block and felt immediate relief.     I called [facility] dental [facility] and they are closed all week. She was strongly encouraged to follow up with a dentist in the next week. A dental list was provided.     Discussed plan of care. She verbalized understanding. All questions answered.     I have reviewed the nursing notes.    I have reviewed the findings, diagnosis, plan and need for follow up with the patient.  Discharged in stable condition.     New Prescriptions    AZITHROMYCIN (ZITHROMAX Z-RICKEY) 250 MG TABLET    Two tablets on the first day, then one tablet daily for the next 4 days       Final diagnoses:   Dental infection   Migraine with aura and without status migrainosus, not intractable     Take antibiotics as ordered.   Eat a yogurt a day while taking antibiotics.   Take tylenol and or ibuprofen for pain.   Dental list provided. Call every morning at 0800 as this is your best chance of getting in.   Follow up with PCP with any increase in symptoms or concerns.   Return to urgent care or emergency department with any increase in symptoms or concerns.     ALFONSO Greene  5/8/2017  9:07 AM  URGENT CARE CLINIC       Irene Gresham NP  05/08/17 3710

## 2017-05-08 NOTE — ED NOTES
Hx of migraine headache starting this am. Took her zofran but ran out of her imitex that she uses for.

## 2017-05-08 NOTE — DISCHARGE INSTRUCTIONS
Take antibiotics as ordered.   Eat a yogurt a day while taking antibiotics.   Take tylenol and or ibuprofen for pain.   Dental list provided. Call every morning at 0800 as this is your best chance of getting in.   Follow up with PCP with any increase in symptoms or concerns.   Return to urgent care or emergency department with any increase in symptoms or concerns.

## 2017-05-08 NOTE — ED NOTES
"Pt ambulated independently into room unaccompanied. Migraine started at 0300, pt states she is here for an \"Imetrex shot, nothing else for the headache\". Pt reports Rt lower molar pain, jaw swelling. Ibuprofen 400 mg at 0630.   "

## 2017-05-08 NOTE — ED AVS SNAPSHOT
[facility]    [address] [address]    Phone:  [phone]                                       Venu Amaya   MRN: [id number]    Department:  [facility]   Date of Visit:  5/8/2017           Patient Information     Date Of Birth          1975        Your diagnoses for this visit were:     Dental infection     Migraine with aura and without status migrainosus, not intractable        You were seen by Irene Gresham NP.      Follow-up Information     Follow up with Robin Segovia MD.    Specialty:  Family Practice    Why:  As needed, If symptoms worsen    Contact information:    [facility]  [address] [facility] [address] [phone]          Follow up with [facility].    Specialty:  EMERGENCY MEDICINE    Why:  As needed, If symptoms worsen    Contact information:    [address] [address] [phone]    Additional information:    From [facility]: Take [address]. Turn left at [address]. Turn left at the first stoplight on [address]. At the first stop sign, take a right onto [address]. Take a left into the parking lot and continue through until you reach the North enterance of the building.       From [place]: Take [address]. Take the [address] ramp towards [place]. Turn left onto [address]. Take a slight right onto [address]. Turn left at the first stoplight on [address]. At the first stop sign, take a right onto [address]. Take a left into the parking lot and continue through until you reach the North enterance of the building.       From [place]: Take [address]. Take a right at [address]. At the first stop sign, take a right onto [address]. Take a left into the parking lot and continue through until you reach the North enterance of the building.         Discharge Instructions       Take antibiotics as  ordered.   Eat a yogurt a day while taking antibiotics.   Take tylenol and or ibuprofen for pain.   Dental list provided. Call every morning at 0800 as this is your best chance of getting in.   Follow up with PCP with any increase in symptoms or concerns.   Return to urgent care or emergency department with any increase in symptoms or concerns.     Discharge References/Attachments     ABSCESS, DENTAL (ENGLISH)    HEADACHE, MIGRAINE (CLASSICAL) (ENGLISH)    MIGRAINE HEADACHES, PREVENTING, TRIGGERS (ENGLISH)         Review of your medicines      START taking        Dose / Directions Last dose taken    azithromycin 250 MG tablet   Commonly known as:  ZITHROMAX Z-RICKEY   Quantity:  6 tablet        Two tablets on the first day, then one tablet daily for the next 4 days   Refills:  0          Our records show that you are taking the medicines listed below. If these are incorrect, please call your family doctor or clinic.        Dose / Directions Last dose taken    acetaminophen-codeine 300-30 MG per tablet   Commonly known as:  TYLENOL/codeine #3   Quantity:  60 tablet        1-2 tabs up to twice daily as needed.   Refills:  0        ibuprofen 800 MG tablet   Commonly known as:  ADVIL/MOTRIN   Quantity:  60 tablet        TAKE 1 TABLET EVERY 8 HOURS AS NEEDED FOR MODERATE PAIN   Refills:  5        MULTIVITAMIN ADULT PO        Take by mouth daily   Refills:  0        ondansetron 4 MG ODT tab   Commonly known as:  ZOFRAN-ODT   Dose:  4 mg   Quantity:  10 tablet        Take 1 tablet (4 mg) by mouth every 8 hours as needed for nausea   Refills:  0        SUMAtriptan 6 MG/0.5ML injection   Commonly known as:  IMITREX   Dose:  6 mg   Quantity:  5 vial        Inject 0.5 mLs (6 mg) Subcutaneous every 12 hours as needed for migraine   Refills:  0                Prescriptions were sent or printed at these locations (1 Prescription)                   [facility] [facility] [address] AT [facility] [address] [facility] [address]  "  [address] [place] [address]    Telephone:  [phone]   Fax:  [phone]   Hours:                  E-Prescribed (1 of 1)         azithromycin (ZITHROMAX Z-RICKEY) 250 MG tablet                Orders Needing Specimen Collection     None      Pending Results     No orders found from 2017 to 2017.            Pending Culture Results     No orders found from 2017 to 2017.            Thank you for choosing [facility]       Thank you for choosing [facility] for your care. Our goal is always to provide you with excellent care. Hearing back from our patients is one way we can continue to improve our services. Please take a few minutes to complete the written survey that you may receive in the mail after you visit with us. Thank you!        [employer] Information     [employer] lets you send messages to your doctor, view your test results, renew your prescriptions, schedule appointments and more. To sign up, go to [email or web address] . Click on \"Log in\" on the left side of the screen, which will take you to the Welcome page. Then click on \"Sign up Now\" on the right side of the page.     You will be asked to enter the access code listed below, as well as some personal information. Please follow the directions to create your username and password.     Your access code is: [email or web address]  Expires: 2017  3:23 PM     Your access code will  in 90 days. If you need help or a new code, please call your [facility] clinic or [phone].        Care EveryWhere ID     This is your Care EveryWhere ID. This could be used by other organizations to access your [facility] medical records  [id number]        After Visit Summary       This is your record. Keep this with you and show to your community pharmacist(s) and doctor(s) at your next visit.                  "

## 2017-06-08 ENCOUNTER — OFFICE VISIT (OUTPATIENT)
Dept: OBGYN | Facility: OTHER | Age: 42
End: 2017-06-08
Attending: OBSTETRICS & GYNECOLOGY
Payer: COMMERCIAL

## 2017-06-08 VITALS
WEIGHT: 113 LBS | HEART RATE: 95 BPM | BODY MASS INDEX: 20.02 KG/M2 | HEIGHT: 63 IN | DIASTOLIC BLOOD PRESSURE: 69 MMHG | OXYGEN SATURATION: 94 % | SYSTOLIC BLOOD PRESSURE: 110 MMHG

## 2017-06-08 DIAGNOSIS — Z01.411 ABNORMAL FEMALE PELVIC EXAM: ICD-10-CM

## 2017-06-08 DIAGNOSIS — Z12.39 SCREENING FOR BREAST CANCER: ICD-10-CM

## 2017-06-08 DIAGNOSIS — Z12.4 SCREENING FOR CERVICAL CANCER: Primary | ICD-10-CM

## 2017-06-08 DIAGNOSIS — Z87.410 PERSONAL HISTORY OF CERVICAL DYSPLASIA: ICD-10-CM

## 2017-06-08 PROCEDURE — G0123 SCREEN CERV/VAG THIN LAYER: HCPCS | Mod: ZL | Performed by: OBSTETRICS & GYNECOLOGY

## 2017-06-08 PROCEDURE — 99213 OFFICE O/P EST LOW 20 MIN: CPT

## 2017-06-08 PROCEDURE — G0476 HPV COMBO ASSAY CA SCREEN: HCPCS | Mod: ZL | Performed by: OBSTETRICS & GYNECOLOGY

## 2017-06-08 PROCEDURE — 99000 SPECIMEN HANDLING OFFICE-LAB: CPT | Mod: ZL | Performed by: OBSTETRICS & GYNECOLOGY

## 2017-06-08 PROCEDURE — 99214 OFFICE O/P EST MOD 30 MIN: CPT | Performed by: OBSTETRICS & GYNECOLOGY

## 2017-06-08 PROCEDURE — 87624 HPV HI-RISK TYP POOLED RSLT: CPT | Mod: ZL | Performed by: OBSTETRICS & GYNECOLOGY

## 2017-06-08 PROCEDURE — 88142 CYTOPATH C/V THIN LAYER: CPT | Performed by: OBSTETRICS & GYNECOLOGY

## 2017-06-08 RX ORDER — CLONAZEPAM 1 MG/1
1 TABLET ORAL 2 TIMES DAILY PRN
COMMUNITY
Start: 2017-05-01 | End: 2017-06-20

## 2017-06-08 ASSESSMENT — PAIN SCALES - GENERAL: PAINLEVEL: NO PAIN (0)

## 2017-06-08 NOTE — LETTER
Venu,           Our office has been notified that you have not had your mammogram. It is very important that you do this. Please call [phone] to schedule it. Thank you for choosing [facility].                  Theo Barnett MD/ Shadia THOMAS

## 2017-06-08 NOTE — PROGRESS NOTES
S:  Pt presents for cervical dysplasia f/u/Pap  40yo f with h/o abnormal Pap smear/Colposcopy 2011.   F/u Pap 2013 nml.  She has had a BTO for BC.  Menses regular without IMB.  She states she had recent breast exam with Dr. Segovia but was told to schedule Mammo.  Denies GYN complaints or STD concerns.   in monosomous relationship.  Denies abnormal DC, Pelvic pain, AUB.             Patient Active Problem List   Diagnosis     Abnormal pap     Anxiety state     Depressive disorder, not elsewhere classified     Migraine     Ankylosing spondylitis (H)     Cervicalgia     Dysthymic disorder     Myalgia and myositis     Chronic low back pain     Opioid dependence (H)     Headache            Past Medical History:   Diagnosis Date     Abnormal Pap smear and cervical HPV (human papillo 05/25/2011     Ankylosing spondylitis (H) 05/24/2012    Flori Finch PAC      Anxiety state, unspecified 06/02/2009     Cervicalgia pain (neck) 11/03/2011     Chronic Headache disorder  05/25/2011     Depressive disorder, not elsewhere classified 10/31/2007     Dysmenorrhea 04/03/2012     Habitual aborter, antepartum condition or complica 02/10/2005    Christa Tabares MD      Inguinal hernia without mention of obstruction or gangrene, unilateral or unspecified, (not specified as recurrent) 06/16/2008     Irregular menstrual cycle 05/25/2011     Mental disorders of mother, postpartum 10/06/2005     Migraine, unspecified, without mention of intractable migraine without mention of status migrainosus 01/15/2003     Moderate dysplasia of cervix 11/03/2011     Pain in thoracic spine 11/03/2011     Scoliosis (and kyphoscoliosis), idiopathic 05/16/2011    Abdiel Nolan MD             Past Surgical History:   Procedure Laterality Date     inguinal hernia repair, Germain repair  10/14/2008    RT      TUBAL LIGATION              Social History   Substance Use Topics     Smoking status: Light Tobacco Smoker     Packs/day: 0.25     Types:  "Cigarettes     Last attempt to quit: 7/20/2015     Smokeless tobacco: Never Used      Comment: Passive Exposure: Yes      Alcohol use No            Family History   Problem Relation Age of Onset     CANCER Mother      CANCER Other      CANCER Other                Allergies   Allergen Reactions     Amoxicillin Swelling     Hands swell      Levofloxacin Other (See Comments)     Unknown reaction - Levaquin      No Clinical Screening - See Comments      utapar drop in blood pressure     Penicillins      Ritodrine             Current Outpatient Prescriptions   Medication Sig Dispense Refill     clonazePAM (KLONOPIN) 1 MG tablet Take 1 mg by mouth 2 times daily as needed       Multiple Vitamins-Minerals (MULTIVITAMIN ADULT PO) Take by mouth daily       ibuprofen (ADVIL,MOTRIN) 800 MG tablet TAKE 1 TABLET EVERY 8 HOURS AS NEEDED FOR MODERATE PAIN 60 tablet 5     SUMAtriptan (IMITREX) 6 MG/0.5ML vial Inject 0.5 mLs (6 mg) Subcutaneous every 12 hours as needed for migraine (Patient not taking: Reported on 6/8/2017) 5 vial 0     ondansetron (ZOFRAN-ODT) 4 MG disintegrating tablet Take 1 tablet (4 mg) by mouth every 8 hours as needed for nausea (Patient not taking: Reported on 6/8/2017) 10 tablet 0     acetaminophen-codeine (TYLENOL/CODEINE #3) 300-30 MG per tablet 1-2 tabs up to twice daily as needed. (Patient not taking: Reported on 6/8/2017) 60 tablet 0     [DISCONTINUED] PRENATAL VITAMINS PO Take 1 tablet by mouth daily.            Review Of Systems  Constitutional:  Denies fever  GI/ negative except as noted per hpi    O:   /69 (BP Location: Left arm, Cuff Size: Adult Regular)  Pulse 95  Ht 5' 3\" (1.6 m)  Wt 113 lb (51.3 kg)  SpO2 94%  BMI 20.02 kg/m2  Gen:  NAD, A and O  Vitals: /69 (BP Location: Left arm, Cuff Size: Adult Regular)  Pulse 95  Ht 5' 3\" (1.6 m)  Wt 113 lb (51.3 kg)  SpO2 94%  BMI 20.02 kg/m2  BMI= Body mass index is 20.02 kg/(m^2).  Abd soft, NT, ND  Lymphadenopathy:  + left " inguinal  Vulva: No lesions, erythema, BUS wnl  Vagina: Pink, moist mucosa with rugae,no lesions  Mild pelvic relaxation  Cervix: No lesions, no CMT  Uterus: Midposition, normal size and irregular countour, mobile, NT  Adnexa: Non-palpable, NT, no masses  Anus without lesions  Ext.  neg         A:  H/o cervical dysplasia, abnormal pelvic examination with Left inguinal adenopathy.    P:  Screening pap done with HPV.  Mammo ordered.    Ct abdomen pelvis to r/o pelvic or inguinal pathology.   Pt has my card and phone number to call as needed if problems in the interim or she does not here her results.

## 2017-06-08 NOTE — PATIENT INSTRUCTIONS
Pt has my card and phone number to call as needed if problems in the interim or she does not here her results.

## 2017-06-08 NOTE — LETTER
[facility] [place]  [address]  [place] [address]  [phone]        June 16, 2017    Venu Amaya  [address] [place] [address]          Dear Venu Amaya    Your pap smear and HPV are  normal.  It is generally recommended that women have a yearly pelvic exam.  If your provider  requested that you have a pap smear more frequently because of any previous problems please schedule that appointment as requested.  If you have any questions please call the clinic at [phone].      Sincerely,        Theo Barnett MD/ Shadia THOMAS

## 2017-06-08 NOTE — NURSING NOTE
"Chief Complaint   Patient presents with     RECHECK     pap smear       Initial /69 (BP Location: Left arm, Cuff Size: Adult Regular)  Pulse 95  Ht 5' 3\" (1.6 m)  Wt 113 lb (51.3 kg)  SpO2 94%  BMI 20.02 kg/m2 Estimated body mass index is 20.02 kg/(m^2) as calculated from the following:    Height as of this encounter: 5' 3\" (1.6 m).    Weight as of this encounter: 113 lb (51.3 kg).  Medication Reconciliation: complete     Colleen Puentes      "

## 2017-06-08 NOTE — MR AVS SNAPSHOT
"              After Visit Summary   6/8/2017    Venu Amaya    MRN: [id number]           Patient Information     Date Of Birth          1975        Visit Information        Provider Department      6/8/2017 2:45 PM Theo Barnett MD [facility]        Today's Diagnoses     Screening for cervical cancer    -  1    Screening for breast cancer        Abnormal female pelvic exam        Personal history of cervical dysplasia          Care Instructions     Pt has my card and phone number to call as needed if problems in the interim or she does not here her results.           Follow-ups after your visit        Who to contact     If you have questions or need follow up information about today's clinic visit or your schedule please contact [facility] directly at [phone].  Normal or non-critical lab and imaging results will be communicated to you by MyChart, letter or phone within 4 business days after the clinic has received the results. If you do not hear from us within 7 days, please contact the clinic through UpOuthart or phone. If you have a critical or abnormal lab result, we will notify you by phone as soon as possible.  Submit refill requests through [employer]Pal or call your pharmacy and they will forward the refill request to us. Please allow 3 business days for your refill to be completed.          Additional Information About Your Visit        MyChart Information     [employer] lets you send messages to your doctor, view your test results, renew your prescriptions, schedule appointments and more. To sign up, go to [email or web address] . Click on \"Log in\" on the left side of the screen, which will take you to the Welcome page. Then click on \"Sign up Now\" on the right side of the page.     You will be asked to enter the access code listed below, as well as some personal information. Please follow the directions to create your username and password.     Your access code is: " [email or web address]  Expires: 2017  3:23 PM     Your access code will  in 90 days. If you need help or a new code, please call your [facility] clinic or [phone].        Care EveryWhere ID     This is your Care EveryWhere ID. This could be used by other organizations to access your [facility] medical records  [id number]        Your Vitals Were     Pulse Height Pulse Oximetry BMI (Body Mass Index)          95 5' 3\" (1.6 m) 94% 20.02 kg/m2         Blood Pressure from Last 3 Encounters:   17 110/69   17 105/70   17 96/63    Weight from Last 3 Encounters:   17 113 lb (51.3 kg)   16 109 lb (49.4 kg)   09/04/15 108 lb (49 kg)              We Performed the Following     A pap thin layer screen with  HPV - recommended age 30 - 65 years (select HPV order below)     HPV High Risk Types DNA Cervical        Primary Care Provider Office Phone # Fax #    Robin Segovia [phone]       [facility] [address] [facility] [address]        Thank you!     Thank you for choosing [facility]  for your care. Our goal is always to provide you with excellent care. Hearing back from our patients is one way we can continue to improve our services. Please take a few minutes to complete the written survey that you may receive in the mail after your visit with us. Thank you!             Your Updated Medication List - Protect others around you: Learn how to safely use, store and throw away your medicines at www.disposemymeds.org.          This list is accurate as of: 17  3:46 PM.  Always use your most recent med list.                   Brand Name Dispense Instructions for use    acetaminophen-codeine 300-30 MG per tablet    TYLENOL/codeine #3    60 tablet    1-2 tabs up to twice daily as needed.       clonazePAM 1 MG tablet    klonoPIN     Take 1 mg by mouth 2 times daily as needed       ibuprofen 800 MG tablet    ADVIL/MOTRIN    60 tablet    TAKE 1 TABLET EVERY 8 HOURS AS " NEEDED FOR MODERATE PAIN       MULTIVITAMIN ADULT PO      Take by mouth daily       ondansetron 4 MG ODT tab    ZOFRAN-ODT    10 tablet    Take 1 tablet (4 mg) by mouth every 8 hours as needed for nausea       SUMAtriptan 6 MG/0.5ML injection    IMITREX    5 vial    Inject 0.5 mLs (6 mg) Subcutaneous every 12 hours as needed for migraine

## 2017-06-09 ENCOUNTER — HOSPITAL ENCOUNTER (OUTPATIENT)
Dept: CT IMAGING | Facility: HOSPITAL | Age: 42
Discharge: HOME | End: 2017-06-09
Attending: OBSTETRICS & GYNECOLOGY | Admitting: OBSTETRICS & GYNECOLOGY
Payer: COMMERCIAL

## 2017-06-09 PROCEDURE — 74177 CT ABD & PELVIS W/CONTRAST: CPT | Mod: TC

## 2017-06-09 RX ORDER — IOPAMIDOL 612 MG/ML
100 INJECTION, SOLUTION INTRAVASCULAR ONCE
Status: COMPLETED | OUTPATIENT
Start: 2017-06-09 | End: 2017-06-09

## 2017-06-09 RX ADMIN — DIATRIZOATE MEGLUMINE AND DIATRIZOATE SODIUM 30 ML: 660; 100 SOLUTION ORAL; RECTAL at 14:55

## 2017-06-09 RX ADMIN — IOPAMIDOL 100 ML: 612 INJECTION, SOLUTION INTRAVASCULAR at 14:55

## 2017-06-15 LAB
COPATH REPORT: NORMAL
PAP: NORMAL

## 2017-06-16 LAB
FINAL DIAGNOSIS: NORMAL
HPV HR 12 DNA CVX QL NAA+PROBE: NEGATIVE
HPV16 DNA SPEC QL NAA+PROBE: NEGATIVE
HPV18 DNA SPEC QL NAA+PROBE: NEGATIVE
SPECIMEN DESCRIPTION: NORMAL

## 2017-06-19 DIAGNOSIS — Z12.31 VISIT FOR SCREENING MAMMOGRAM: Primary | ICD-10-CM

## 2017-06-20 ENCOUNTER — OFFICE VISIT (OUTPATIENT)
Dept: FAMILY MEDICINE | Facility: OTHER | Age: 42
End: 2017-06-20
Attending: NURSE PRACTITIONER
Payer: COMMERCIAL

## 2017-06-20 VITALS
HEART RATE: 72 BPM | RESPIRATION RATE: 18 BRPM | SYSTOLIC BLOOD PRESSURE: 100 MMHG | BODY MASS INDEX: 18.61 KG/M2 | TEMPERATURE: 98.4 F | WEIGHT: 105 LBS | HEIGHT: 63 IN | DIASTOLIC BLOOD PRESSURE: 62 MMHG

## 2017-06-20 DIAGNOSIS — R30.0 DYSURIA: ICD-10-CM

## 2017-06-20 DIAGNOSIS — K04.7 DENTAL INFECTION: Primary | ICD-10-CM

## 2017-06-20 LAB
ALBUMIN UR-MCNC: NEGATIVE MG/DL
APPEARANCE UR: ABNORMAL
BACTERIA #/AREA URNS HPF: ABNORMAL /HPF
BILIRUB UR QL STRIP: NEGATIVE
COLOR UR AUTO: YELLOW
GLUCOSE UR STRIP-MCNC: NEGATIVE MG/DL
HGB UR QL STRIP: NEGATIVE
KETONES UR STRIP-MCNC: NEGATIVE MG/DL
LEUKOCYTE ESTERASE UR QL STRIP: NEGATIVE
MUCOUS THREADS #/AREA URNS LPF: PRESENT /LPF
NITRATE UR QL: NEGATIVE
PH UR STRIP: 6 PH (ref 4.7–8)
RBC #/AREA URNS AUTO: <1 /HPF (ref 0–2)
SP GR UR STRIP: 1.02 (ref 1–1.03)
SQUAMOUS #/AREA URNS AUTO: 16 /HPF (ref 0–1)
URN SPEC COLLECT METH UR: ABNORMAL
UROBILINOGEN UR STRIP-MCNC: NORMAL MG/DL (ref 0–2)
WBC #/AREA URNS AUTO: <1 /HPF (ref 0–2)

## 2017-06-20 PROCEDURE — 99213 OFFICE O/P EST LOW 20 MIN: CPT | Performed by: NURSE PRACTITIONER

## 2017-06-20 PROCEDURE — 99212 OFFICE O/P EST SF 10 MIN: CPT

## 2017-06-20 PROCEDURE — 81001 URINALYSIS AUTO W/SCOPE: CPT | Mod: ZL | Performed by: NURSE PRACTITIONER

## 2017-06-20 RX ORDER — AZITHROMYCIN 250 MG/1
TABLET, FILM COATED ORAL
Qty: 6 TABLET | Refills: 0 | Status: SHIPPED | OUTPATIENT
Start: 2017-06-20 | End: 2017-07-15

## 2017-06-20 RX ORDER — CLONAZEPAM 1 MG/1
1 TABLET ORAL 2 TIMES DAILY PRN
COMMUNITY
Start: 2017-06-16 | End: 2022-01-03

## 2017-06-20 ASSESSMENT — ANXIETY QUESTIONNAIRES
7. FEELING AFRAID AS IF SOMETHING AWFUL MIGHT HAPPEN: NOT AT ALL
GAD7 TOTAL SCORE: 14
4. TROUBLE RELAXING: NEARLY EVERY DAY
2. NOT BEING ABLE TO STOP OR CONTROL WORRYING: NEARLY EVERY DAY
6. BECOMING EASILY ANNOYED OR IRRITABLE: SEVERAL DAYS
1. FEELING NERVOUS, ANXIOUS, OR ON EDGE: NEARLY EVERY DAY
5. BEING SO RESTLESS THAT IT IS HARD TO SIT STILL: SEVERAL DAYS
3. WORRYING TOO MUCH ABOUT DIFFERENT THINGS: NEARLY EVERY DAY
IF YOU CHECKED OFF ANY PROBLEMS ON THIS QUESTIONNAIRE, HOW DIFFICULT HAVE THESE PROBLEMS MADE IT FOR YOU TO DO YOUR WORK, TAKE CARE OF THINGS AT HOME, OR GET ALONG WITH OTHER PEOPLE: EXTREMELY DIFFICULT

## 2017-06-20 ASSESSMENT — PAIN SCALES - GENERAL: PAINLEVEL: NO PAIN (0)

## 2017-06-20 NOTE — MR AVS SNAPSHOT
"              After Visit Summary   6/20/2017    Venu Amaya    MRN: [id number]           Patient Information     Date Of Birth          1975        Visit Information        Provider Department      6/20/2017 8:20 AM Nona Ahn APRN [facility]        Today's Diagnoses     Dental infection    -  1    Dysuria          Care Instructions      Dental Abscess    An abscess is a pocket of pus at the tip of a tooth root in your jaw bone. It is caused by an infection at the root of the tooth. It can cause pain and swelling of the gum, cheek, or jaw. Pain may spread from the tooth to your ear or the area of your jaw on the same side. If the abscess isn t treated, it appears as a bubble or swelling on the gum near the tooth. The pressure that builds in this swelling is the source of the pain. More serious infections cause your face to swell.  An abscess can be caused by a crack in the tooth, a cavity, a gum infection, or a combination of these. Once the pulp of the tooth is exposed, bacteria can spread down the roots to the tip. If the bacteria are not stopped, they can damage the bone and soft tissue, and an abscess can form.  Home care  Follow these guidelines when caring for yourself at home:    Avoid hot and cold foods and drinks. Your tooth may be sensitive to changes in temperature. Don t chew on the side of the infected tooth.    If your tooth is chipped or cracked, or if there is a large open cavity, put oil of cloves directly on the tooth to relieve pain. You can buy oil of cloves at drugstores. Some pharmacies carry an over-the-counter \"toothache kit.\" This contains a paste that you can put on the exposed tooth to make it less sensitive.    Put a cold pack on your jaw over the sore area to help reduce pain.    You may use over-the-counter medicine to ease pain, unless another medicine was prescribed. If you have chronic liver or kidney disease, talk with your healthcare " provider before using acetaminophen or ibuprofen. Also talk with your provider if you ve had a stomach ulcer or GI bleeding.    An antibiotic will be prescribed. Take it until finished, even if you are feeling better after a few days.  Follow-up care  Follow up with your dentist or an oral surgeon, or as advised. Once an infection occurs in a tooth, it will continue to be a problem until the infection is drained. This is done through surgery or a root canal. Or you may need to have your tooth pulled.  Call 911  Call 911 if any of these occur:    Unusual drowsiness    Headache or stiff neck    Weakness or fainting    Difficulty swallowing, breathing, or opening your mouth    Swollen eyelids  When to seek medical advice  Call your healthcare provider right away if any of these occur:    Your face becomes more swollen or red    Pain gets worse or spreads to your neck    Fever of 100.4  F (38.0  C) or higher, or as directed by your healthcare provider    Pus drains from the tooth  Date Last Reviewed: 10/1/2016    0877-8911 [employer]. [address] [place] [address]. All rights reserved. This information is not intended as a substitute for professional medical care. Always follow your healthcare professional's instructions.                  Follow-ups after your visit        Follow-up notes from your care team     Return if symptoms worsen or fail to improve.      Your next 10 appointments already scheduled     Jun 23, 2017  2:30 PM CDT   MAMMOGRAM with  MAMMOGRAM [facility]    [address] [facility] [facility] [address]   [phone]           Do not wear any body powder, lotions, deodorant or perfume the day of the exam. Bring a list of all medications, especially hormones.  If your last mammogram was not done at [facility], please bring your mammogram films. We will need the name of your MD/PA to send a copy of your report.              Who to  "contact     If you have questions or need follow up information about today's clinic visit or your schedule please contact [facility] directly at [phone].  Normal or non-critical lab and imaging results will be communicated to you by MyChart, letter or phone within 4 business days after the clinic has received the results. If you do not hear from us within 7 days, please contact the clinic through MyChart or phone. If you have a critical or abnormal lab result, we will notify you by phone as soon as possible.  Submit refill requests through [employer] or call your pharmacy and they will forward the refill request to us. Please allow 3 business days for your refill to be completed.          Additional Information About Your Visit        Glad to Have You[employer]Point Inside Information     [employer] lets you send messages to your doctor, view your test results, renew your prescriptions, schedule appointments and more. To sign up, go to [email or web address] . Click on \"Log in\" on the left side of the screen, which will take you to the Welcome page. Then click on \"Sign up Now\" on the right side of the page.     You will be asked to enter the access code listed below, as well as some personal information. Please follow the directions to create your username and password.     Your access code is: [email or web address]  Expires: 2017  3:23 PM     Your access code will  in 90 days. If you need help or a new code, please call your [facility] clinic or [phone].        Care EveryWhere ID     This is your Care EveryWhere ID. This could be used by other organizations to access your [facility] medical records  [id number]        Your Vitals Were     Pulse Temperature Respirations Height BMI (Body Mass Index)       72 98.4  F (36.9  C) (Tympanic) 18 5' 3\" (1.6 m) 18.6 kg/m2        Blood Pressure from Last 3 Encounters:   17 100/62   17 110/69   17 105/70    Weight from Last 3 Encounters:   17 105 lb (47.6 kg) "   06/08/17 113 lb (51.3 kg)   02/02/16 109 lb (49.4 kg)              We Performed the Following     UA reflex to Microscopic and Culture - [place]          Today's Medication Changes          These changes are accurate as of: 6/20/17  8:38 AM.  If you have any questions, ask your nurse or doctor.               Start taking these medicines.        Dose/Directions    azithromycin 250 MG tablet   Commonly known as:  ZITHROMAX   Used for:  Dental infection   Started by:  Nona Ahn APRN CNP        Two tablets first day, then one tablet daily for four days.   Quantity:  6 tablet   Refills:  0            Where to get your medicines      These medications were sent to [id number] - [place], [place] - [address] [facility] [address]  [address], [place] [address]     Phone:  [phone]     azithromycin 250 MG tablet                Primary Care Provider Office Phone # Fax #    Robin Segovia [phone]       [facility] [address]  [place] [address]        Thank you!     Thank you for choosing [facility]  for your care. Our goal is always to provide you with excellent care. Hearing back from our patients is one way we can continue to improve our services. Please take a few minutes to complete the written survey that you may receive in the mail after your visit with us. Thank you!             Your Updated Medication List - Protect others around you: Learn how to safely use, store and throw away your medicines at www.disposemymeds.org.          This list is accurate as of: 6/20/17  8:38 AM.  Always use your most recent med list.                   Brand Name Dispense Instructions for use    acetaminophen-codeine 300-30 MG per tablet    TYLENOL/codeine #3    60 tablet    1-2 tabs up to twice daily as needed.       azithromycin 250 MG tablet    ZITHROMAX    6 tablet    Two tablets first day, then one tablet daily for four days.       clonazePAM 1 MG  tablet    klonoPIN     Take 1 mg by mouth       ibuprofen 800 MG tablet    ADVIL/MOTRIN    60 tablet    TAKE 1 TABLET EVERY 8 HOURS AS NEEDED FOR MODERATE PAIN       MULTIVITAMIN ADULT PO      Take by mouth daily       SUMAtriptan 6 MG/0.5ML injection    IMITREX    5 vial    Inject 0.5 mLs (6 mg) Subcutaneous every 12 hours as needed for migraine

## 2017-06-20 NOTE — PATIENT INSTRUCTIONS
"  Dental Abscess    An abscess is a pocket of pus at the tip of a tooth root in your jaw bone. It is caused by an infection at the root of the tooth. It can cause pain and swelling of the gum, cheek, or jaw. Pain may spread from the tooth to your ear or the area of your jaw on the same side. If the abscess isn t treated, it appears as a bubble or swelling on the gum near the tooth. The pressure that builds in this swelling is the source of the pain. More serious infections cause your face to swell.  An abscess can be caused by a crack in the tooth, a cavity, a gum infection, or a combination of these. Once the pulp of the tooth is exposed, bacteria can spread down the roots to the tip. If the bacteria are not stopped, they can damage the bone and soft tissue, and an abscess can form.  Home care  Follow these guidelines when caring for yourself at home:    Avoid hot and cold foods and drinks. Your tooth may be sensitive to changes in temperature. Don t chew on the side of the infected tooth.    If your tooth is chipped or cracked, or if there is a large open cavity, put oil of cloves directly on the tooth to relieve pain. You can buy oil of cloves at drugstores. Some pharmacies carry an over-the-counter \"toothache kit.\" This contains a paste that you can put on the exposed tooth to make it less sensitive.    Put a cold pack on your jaw over the sore area to help reduce pain.    You may use over-the-counter medicine to ease pain, unless another medicine was prescribed. If you have chronic liver or kidney disease, talk with your healthcare provider before using acetaminophen or ibuprofen. Also talk with your provider if you ve had a stomach ulcer or GI bleeding.    An antibiotic will be prescribed. Take it until finished, even if you are feeling better after a few days.  Follow-up care  Follow up with your dentist or an oral surgeon, or as advised. Once an infection occurs in a tooth, it will continue to be a problem " until the infection is drained. This is done through surgery or a root canal. Or you may need to have your tooth pulled.  Call 911  Call 911 if any of these occur:    Unusual drowsiness    Headache or stiff neck    Weakness or fainting    Difficulty swallowing, breathing, or opening your mouth    Swollen eyelids  When to seek medical advice  Call your healthcare provider right away if any of these occur:    Your face becomes more swollen or red    Pain gets worse or spreads to your neck    Fever of 100.4  F (38.0  C) or higher, or as directed by your healthcare provider    Pus drains from the tooth  Date Last Reviewed: 10/1/2016    5670-7058 [employer]. [address] [place] [address], [place] [address]. All rights reserved. This information is not intended as a substitute for professional medical care. Always follow your healthcare professional's instructions.

## 2017-06-20 NOTE — NURSING NOTE
"Chief Complaint   Patient presents with     UTI     lab done       Initial /62 (BP Location: Right arm, Patient Position: Sitting, Cuff Size: Adult Regular)  Pulse 72  Temp 98.4  F (36.9  C) (Tympanic)  Resp 18  Ht 5' 3\" (1.6 m)  Wt 105 lb (47.6 kg)  BMI 18.6 kg/m2 Estimated body mass index is 18.6 kg/(m^2) as calculated from the following:    Height as of this encounter: 5' 3\" (1.6 m).    Weight as of this encounter: 105 lb (47.6 kg).  Medication Reconciliation: complete   Clare Lipscomb      "

## 2017-06-20 NOTE — PROGRESS NOTES
SUBJECTIVE:                                                    Venu Amaya is a 41 year old female who presents to clinic today for the following health issues:      URINARY TRACT SYMPTOMS      Duration: 7 days    Description  dysuria, frequency and urgency.  Has had a recent pap with Dr. Barnett with fu needed regarding liver    Intensity:  moderate    Accompanying signs and symptoms:  Fever/chills: no   Flank pain no   Nausea and vomiting: no   Vaginal symptoms: none  Abdominal/Pelvic Pain: YES cramping period due    History  History of frequent UTI's: no   History of kidney stones: no   Sexually Active: YES  Possibility of pregnancy: No    Precipitating or alleviating factors: None    Therapies tried and outcome: none   Outcome: none     Tooth pain      Duration: 2 weeks    Description (location/character/radiation): right lower tooth is fractured, no appt yet    Intensity:  moderate    Accompanying signs and symptoms: carmela, swelling    History (similar episodes/previous evaluation): ongoing for several months    Precipitating or alleviating factors: None    Therapies tried and outcome: None    Does have a list of dentist and is trying to get into [facility] states she will continue to contact the different dentist to get an appointment           Problem list and histories reviewed & adjusted, as indicated.  Additional history: as documented    Patient Active Problem List   Diagnosis     Abnormal pap     Anxiety state     Depressive disorder, not elsewhere classified     Migraine     Ankylosing spondylitis (H)     Cervicalgia     Dysthymic disorder     Myalgia and myositis     Chronic low back pain     Opioid dependence (H)     Headache     Long term current use of opiate analgesic     Past Surgical History:   Procedure Laterality Date     inguinal hernia repair, Germain repair  10/14/2008    RT      TUBAL LIGATION         Social History   Substance Use Topics     Smoking status: Light Tobacco Smoker      Packs/day: 0.25     Types: Cigarettes     Last attempt to quit: 7/20/2015     Smokeless tobacco: Never Used      Comment: Passive Exposure: Yes      Alcohol use No     Family History   Problem Relation Age of Onset     CANCER Mother      lymphoma     CANCER Other      CANCER Other      Colon Cancer Sister          Current Outpatient Prescriptions   Medication Sig Dispense Refill     clonazePAM (KLONOPIN) 1 MG tablet Take 1 mg by mouth       Multiple Vitamins-Minerals (MULTIVITAMIN ADULT PO) Take by mouth daily       ibuprofen (ADVIL,MOTRIN) 800 MG tablet TAKE 1 TABLET EVERY 8 HOURS AS NEEDED FOR MODERATE PAIN 60 tablet 5     acetaminophen-codeine (TYLENOL/CODEINE #3) 300-30 MG per tablet 1-2 tabs up to twice daily as needed. 60 tablet 0     SUMAtriptan (IMITREX) 6 MG/0.5ML vial Inject 0.5 mLs (6 mg) Subcutaneous every 12 hours as needed for migraine (Patient not taking: Reported on 6/8/2017) 5 vial 0     [DISCONTINUED] PRENATAL VITAMINS PO Take 1 tablet by mouth daily.       Allergies   Allergen Reactions     Amoxicillin Swelling     Hands swell      Levofloxacin Other (See Comments)     Unknown reaction - Levaquin      No Clinical Screening - See Comments      utapar drop in blood pressure     Penicillins      Ritodrine        Reviewed and updated as needed this visit by clinical staff  Tobacco  Allergies  Meds  Med Hx  Surg Hx  Fam Hx  Soc Hx      Reviewed and updated as needed this visit by Provider         ROS:  C: NEGATIVE for fever, chills, change in weight  ENT/MOUTH: mouth pain - bottom right side lower molar  R: NEGATIVE for significant cough or SOB  CV: NEGATIVE for chest pain, palpitations or peripheral edema  GI: bloating and period cramping  : normal menstrual cycles, frequency  and urgency    OBJECTIVE:                                                    /62 (BP Location: Right arm, Patient Position: Sitting, Cuff Size: Adult Regular)  Pulse 72  Temp 98.4  F (36.9  C) (Tympanic)  Resp  "18  Ht 5' 3\" (1.6 m)  Wt 105 lb (47.6 kg)  BMI 18.6 kg/m2  Body mass index is 18.6 kg/(m^2).   GENERAL: healthy, alert and no distress  HENT: normal cephalic/atraumatic, ear canals and TM's normal, nose and mouth without ulcers or lesions, oropharynx clear, oral mucous membranes moist and broken tooth bottom right molar second to the last noted mild redness and swelling below the tooth  NECK: no adenopathy, no asymmetry, masses, or scars and thyroid normal to palpation  RESP: lungs clear to auscultation - no rales, rhonchi or wheezes  CV: regular rate and rhythm, normal S1 S2, no S3 or S4, no murmur, click or rub, no peripheral edema and peripheral pulses strong  ABDOMEN: soft, nontender, no hepatosplenomegaly, no masses and bowel sounds normal  LYMPH: normal ant/post cervical, supraclavicular nodes    Diagnostic Test Results:  none      ASSESSMENT:                                                        PLAN:                                                    ASSESSMENT / PLAN:  (K04.7) Dental infection  (primary encounter diagnosis)  Comment:   Plan:  azithromycin (ZITHROMAX) 250 MG tablet            (R30.0) Dysuria  Comment:   Plan:  UA reflex to Microscopic and Culture - [place],              Follow up with dentist as soon as possible        CAYLA Moreau [facility] [place]  "

## 2017-06-21 ASSESSMENT — ANXIETY QUESTIONNAIRES: GAD7 TOTAL SCORE: 14

## 2017-06-21 ASSESSMENT — PATIENT HEALTH QUESTIONNAIRE - PHQ9: SUM OF ALL RESPONSES TO PHQ QUESTIONS 1-9: 13

## 2017-07-15 ENCOUNTER — HOSPITAL ENCOUNTER (EMERGENCY)
Facility: HOSPITAL | Age: 42
Discharge: HOME | End: 2017-07-15
Attending: EMERGENCY MEDICINE | Admitting: EMERGENCY MEDICINE
Payer: COMMERCIAL

## 2017-07-15 VITALS
HEART RATE: 83 BPM | DIASTOLIC BLOOD PRESSURE: 79 MMHG | RESPIRATION RATE: 16 BRPM | TEMPERATURE: 99.7 F | OXYGEN SATURATION: 100 % | SYSTOLIC BLOOD PRESSURE: 115 MMHG

## 2017-07-15 DIAGNOSIS — G43.009 NONINTRACTABLE MIGRAINE, UNSPECIFIED MIGRAINE TYPE: ICD-10-CM

## 2017-07-15 PROCEDURE — 25000128 H RX IP 250 OP 636: Performed by: EMERGENCY MEDICINE

## 2017-07-15 PROCEDURE — 99283 EMERGENCY DEPT VISIT LOW MDM: CPT

## 2017-07-15 PROCEDURE — 99283 EMERGENCY DEPT VISIT LOW MDM: CPT | Performed by: EMERGENCY MEDICINE

## 2017-07-15 RX ORDER — SUMATRIPTAN 6 MG/.5ML
6 INJECTION, SOLUTION SUBCUTANEOUS ONCE
Status: COMPLETED | OUTPATIENT
Start: 2017-07-15 | End: 2017-07-15

## 2017-07-15 RX ADMIN — SUMATRIPTAN SUCCINATE 6 MG: 6 INJECTION SUBCUTANEOUS at 01:44

## 2017-07-15 ASSESSMENT — ENCOUNTER SYMPTOMS
MUSCULOSKELETAL NEGATIVE: 1
EYE PAIN: 0
TREMORS: 0
CONSTITUTIONAL NEGATIVE: 1
RESPIRATORY NEGATIVE: 1
EYE ITCHING: 0
CARDIOVASCULAR NEGATIVE: 1
HEMATOLOGIC/LYMPHATIC NEGATIVE: 1
HEADACHES: 1
EYE REDNESS: 0
GASTROINTESTINAL NEGATIVE: 1
DIZZINESS: 0
PHOTOPHOBIA: 1
WEAKNESS: 0
NUMBNESS: 0
LIGHT-HEADEDNESS: 0

## 2017-07-15 NOTE — ED AVS SNAPSHOT
[facility]    [address] [address]    Phone:  [phone]                                       Venu Amaya   MRN: [id number]    Department:  [facility]   Date of Visit:  7/15/2017           After Visit Summary Signature Page     I have received my discharge instructions, and my questions have been answered. I have discussed any challenges I see with this plan with the nurse or doctor.    ..........................................................................................................................................  Patient/Patient Representative Signature      ..........................................................................................................................................  Patient Representative Print Name and Relationship to Patient    ..................................................               ................................................  Date                                            Time    ..........................................................................................................................................  Reviewed by Signature/Title    ...................................................              ..............................................  Date                                                            Time

## 2017-07-15 NOTE — DISCHARGE INSTRUCTIONS
* Migraine Headache  Migraine headaches are related to changes in blood flow to the brain. This causes throbbing or constant pain on one or both sides of the head. The pain may last from a few hours to several days. There is usually nausea, vomiting, sensitivity to light and sound, and blurred vision. A migraine attack may be triggered by emotional stress, hormone changes during the menstrual cycle, oral contraceptives, alcohol use, certain foods containing tyramine, eye strain, weather changes, missing meals, or too little or too much sleep.  Home Care For This Headache:  1) If you were given pain medicine for this headache, do not drive yourself home . Arrange for a ride, instead. When you get home, try to sleep. You should feel much better when you wake up.  2) Migraine headaches may improve with an ice pack on the forehead or at the base of the skull. Heat to the back of your neck may relieve any neck spasm.  3) Drink only clear liquids or eat a very light diet to avoid nausea/vomiting until symptoms improve.  Preventing Future Headaches:  1) Pay attention to those factors that seem to trigger your headache. Try to avoid them when you can. If you have frequent headaches, it is useful to keep a diary of what you were doing, feeling or eating in the hours before each attack. Show this to your doctor to help find the cause of your headaches.  a) If you feel that stress is a factor in your headaches, look at the sources of stress in your life. Find ways to release the build-up of those stresses by using regular exercise, relaxation methods (yoga, meditation), bio-feedback or simply taking time-out for yourself. For more information about this, consult your doctor or go to a local bookstore and review books and tapes on this subject.  b) Tyramine is a substance present in the following foods : chocolate, yogurt, all cheeses except cottage cheese and cream cheese. smoked or pickled fish and meat (including herring,  caviar, bologna, pepperoni, salami), liver, avocados, bananas, figs, raisins, and red wine. Be aware that these foods may trigger a migraine in some persons. Try taking these foods out of your diet for 1-2 months to see if this reduces headache frequency.  Treating Future Attacks:  1) At the first sign of a migraine headache, take a medicine to stop it if one has been prescribed for you. If not, take acetaminophen (Tylenol) or ibuprofen (Motrin, Advil) if you are able to take these. The sooner you take medicine, the better it will work.  2) You may also want to find a quiet, dark, comfortable place to sit or lie down. Let yourself relax or sleep.  3) An ice pack on the forehead or area of greatest pain may also help.   Follow Up  with your doctor if the headache is not better within the next 24 hours. If you have frequent headaches you should discuss a treatment plan with your primary care doctor. Ask if you can have medicine to take at home the next time you get a bad headache. Poorly controlled chronic headaches may require a referral to a neurologist (headache specialist).  Get Prompt Medical Attention  if any of the following occur:    Your head pain gets worse, or does not improve within 24 hours    Repeated vomiting (can t keep liquids down)    Sinus or ear or throat pain (not already reported)    Fever of 101  F (38.3  C) or higher, or as directed by your healthcare provider    Stiff neck    Extreme drowsiness, confusion or fainting    Weakness of an arm or leg or one side of the face    Difficulty with speech or vision    You must follow up with your doctor in 12 to 24 hours or return to the ER for a recheck.    You have been given an medication that can cause an allergic reaction. Return to the urgent care if you develop itching, shortness of breath or wheezing or a rash.

## 2017-07-15 NOTE — ED PROVIDER NOTES
History     Chief Complaint   Patient presents with     Headache     has migraine, wants shot of imitrex     HPI  Venu Amaya is a 41 year old female who presents today with complaints of migraine headache. Patient states that sx are similar in intensity and duration of all previous episodes of migraine headaches. Not worst headache of her life. Denies any trauma. Tried to wait to fill rx but was unable to.     I have reviewed the Medications, Allergies, Past Medical and Surgical History, and Social History in the Epic system.      Review of Systems   Constitutional: Negative.    Eyes: Positive for photophobia. Negative for pain, redness, itching and visual disturbance.   Respiratory: Negative.    Cardiovascular: Negative.    Gastrointestinal: Negative.    Genitourinary: Negative.    Musculoskeletal: Negative.    Neurological: Positive for headaches. Negative for dizziness, tremors, syncope, weakness, light-headedness and numbness.   Hematological: Negative.    All other systems reviewed and are negative.      Physical Exam   BP: 115/79  Pulse: 83  Temp: 99.7  F (37.6  C)  Resp: 16  SpO2: 100 %  Physical Exam   Constitutional: She is oriented to person, place, and time. She appears well-developed and well-nourished.   HENT:   Mouth/Throat: Oropharynx is clear and moist.   Neck: Normal range of motion. Neck supple.   Cardiovascular: Normal rate and regular rhythm.    Pulmonary/Chest: Effort normal and breath sounds normal.   Abdominal: Soft.   Musculoskeletal: Normal range of motion.   Neurological: She is alert and oriented to person, place, and time.   Skin: Skin is warm and dry.   Psychiatric: She has a normal mood and affect. Her behavior is normal. Thought content normal.       ED Course     ED Course     Procedures             Labs Ordered and Resulted from Time of ED Arrival Up to the Time of Departure from the ED - No data to display    Assessments & Plan (with Medical Decision Making)     I have  reviewed the nursing notes.    I have reviewed the findings, diagnosis, plan and need for follow up with the patient.      Discharge Medication List as of 7/15/2017  2:23 AM          Final diagnoses:   Nonintractable migraine, unspecified migraine type       7/15/2017   [facility]  Treated with imitrex which she tolerated without difficulty. Has rx to be filled tomm. Will follow up with pcp tomm. Plan: d/c home.      Janice Hunter MD  07/16/17 0155

## 2017-07-15 NOTE — ED AVS SNAPSHOT
[facility]    [address] [address]    Phone:  [phone]                                       Venu Amaya   MRN: [id number]    Department:  [facility]   Date of Visit:  7/15/2017           Patient Information     Date Of Birth          1975        Your diagnoses for this visit were:     Nonintractable migraine, unspecified migraine type        You were seen by Janice Hunter MD.      Follow-up Information     Follow up with Robin Segovia MD.    Specialty:  Family Practice    Contact information:    [facility]  [address] [place] [id number]  [phone]          Discharge Instructions         * Migraine Headache  Migraine headaches are related to changes in blood flow to the brain. This causes throbbing or constant pain on one or both sides of the head. The pain may last from a few hours to several days. There is usually nausea, vomiting, sensitivity to light and sound, and blurred vision. A migraine attack may be triggered by emotional stress, hormone changes during the menstrual cycle, oral contraceptives, alcohol use, certain foods containing tyramine, eye strain, weather changes, missing meals, or too little or too much sleep.  Home Care For This Headache:  1) If you were given pain medicine for this headache, do not drive yourself home . Arrange for a ride, instead. When you get home, try to sleep. You should feel much better when you wake up.  2) Migraine headaches may improve with an ice pack on the forehead or at the base of the skull. Heat to the back of your neck may relieve any neck spasm.  3) Drink only clear liquids or eat a very light diet to avoid nausea/vomiting until symptoms improve.  Preventing Future Headaches:  1) Pay attention to those factors that seem to trigger your headache. Try to avoid them when you can. If you have frequent headaches, it is useful to keep a diary of what you were doing, feeling or eating in the  hours before each attack. Show this to your doctor to help find the cause of your headaches.  a) If you feel that stress is a factor in your headaches, look at the sources of stress in your life. Find ways to release the build-up of those stresses by using regular exercise, relaxation methods (yoga, meditation), bio-feedback or simply taking time-out for yourself. For more information about this, consult your doctor or go to a local bookstore and review books and tapes on this subject.  b) Tyramine is a substance present in the following foods : chocolate, yogurt, all cheeses except cottage cheese and cream cheese. smoked or pickled fish and meat (including herring, caviar, bologna, pepperoni, salami), liver, avocados, bananas, figs, raisins, and red wine. Be aware that these foods may trigger a migraine in some persons. Try taking these foods out of your diet for 1-2 months to see if this reduces headache frequency.  Treating Future Attacks:  1) At the first sign of a migraine headache, take a medicine to stop it if one has been prescribed for you. If not, take acetaminophen (Tylenol) or ibuprofen (Motrin, Advil) if you are able to take these. The sooner you take medicine, the better it will work.  2) You may also want to find a quiet, dark, comfortable place to sit or lie down. Let yourself relax or sleep.  3) An ice pack on the forehead or area of greatest pain may also help.   Follow Up  with your doctor if the headache is not better within the next 24 hours. If you have frequent headaches you should discuss a treatment plan with your primary care doctor. Ask if you can have medicine to take at home the next time you get a bad headache. Poorly controlled chronic headaches may require a referral to a neurologist (headache specialist).  Get Prompt Medical Attention  if any of the following occur:    Your head pain gets worse, or does not improve within 24 hours    Repeated vomiting (can t keep liquids  down)    Sinus or ear or throat pain (not already reported)    Fever of 101  F (38.3  C) or higher, or as directed by your healthcare provider    Stiff neck    Extreme drowsiness, confusion or fainting    Weakness of an arm or leg or one side of the face    Difficulty with speech or vision    You must follow up with your doctor in 12 to 24 hours or return to the ER for a recheck.    You have been given an medication that can cause an allergic reaction. Return to the urgent care if you develop itching, shortness of breath or wheezing or a rash.          Review of your medicines      Our records show that you are taking the medicines listed below. If these are incorrect, please call your family doctor or clinic.        Dose / Directions Last dose taken    acetaminophen-codeine 300-30 MG per tablet   Commonly known as:  TYLENOL/codeine #3   Quantity:  60 tablet        1-2 tabs up to twice daily as needed.   Refills:  0        clonazePAM 1 MG tablet   Commonly known as:  klonoPIN   Dose:  1 mg        Take 1 mg by mouth   Refills:  0        ibuprofen 800 MG tablet   Commonly known as:  ADVIL/MOTRIN   Quantity:  60 tablet        TAKE 1 TABLET EVERY 8 HOURS AS NEEDED FOR MODERATE PAIN   Refills:  5        MULTIVITAMIN ADULT PO        Take by mouth daily   Refills:  0        SUMAtriptan 6 MG/0.5ML injection   Commonly known as:  IMITREX   Dose:  6 mg   Quantity:  5 vial        Inject 0.5 mLs (6 mg) Subcutaneous every 12 hours as needed for migraine   Refills:  0                Orders Needing Specimen Collection     None      Pending Results     No orders found from 7/13/2017 to 7/16/2017.            Pending Culture Results     No orders found from 7/13/2017 to 7/16/2017.            Thank you for choosing [facility]       Thank you for choosing [facility] for your care. Our goal is always to provide you with excellent care. Hearing back from our patients is one way we can continue to improve our services. Please take a few  "minutes to complete the written survey that you may receive in the mail after you visit with us. Thank you!        [employer] Information     [employer] lets you send messages to your doctor, view your test results, renew your prescriptions, schedule appointments and more. To sign up, go to [email or web address] . Click on \"Log in\" on the left side of the screen, which will take you to the Welcome page. Then click on \"Sign up Now\" on the right side of the page.     You will be asked to enter the access code listed below, as well as some personal information. Please follow the directions to create your username and password.     Your access code is: [email or web address]  Expires: 10/13/2017  2:22 AM     Your access code will  in 90 days. If you need help or a new code, please call your [facility] clinic or [phone].        Care EveryWhere ID     This is your Care EveryWhere ID. This could be used by other organizations to access your [facility] medical records  [id number]        Equal Access to Services     [facility]: Hadcecilia Magana, waaxda luamrik, qaybta kaaljosé antonio ashton, erik duenas . So [facility] [phone].    ATENCIÓN: Si habla español, tiene a baltazar disposición servicios gratuitos de asistencia lingüística. Llame al [phone].    We comply with applicable federal civil rights laws and [place] laws. We do not discriminate on the basis of race, color, national origin, age, disability sex, sexual orientation or gender identity.            After Visit Summary       This is your record. Keep this with you and show to your community pharmacist(s) and doctor(s) at your next visit.                  "

## 2017-07-15 NOTE — ED NOTES
This RN went into pt's room with d/c instructions and pt was gone. Admitting states that they saw pt leave department.

## 2017-08-13 ENCOUNTER — HOSPITAL ENCOUNTER (EMERGENCY)
Facility: HOSPITAL | Age: 42
Discharge: HOME | End: 2017-08-13
Attending: PHYSICIAN ASSISTANT | Admitting: PHYSICIAN ASSISTANT
Payer: COMMERCIAL

## 2017-08-13 VITALS
RESPIRATION RATE: 16 BRPM | DIASTOLIC BLOOD PRESSURE: 67 MMHG | SYSTOLIC BLOOD PRESSURE: 108 MMHG | TEMPERATURE: 98.6 F | OXYGEN SATURATION: 100 %

## 2017-08-13 DIAGNOSIS — G43.919 INTRACTABLE MIGRAINE, UNSPECIFIED MIGRAINE TYPE: ICD-10-CM

## 2017-08-13 PROCEDURE — 99213 OFFICE O/P EST LOW 20 MIN: CPT | Performed by: PHYSICIAN ASSISTANT

## 2017-08-13 PROCEDURE — 96372 THER/PROPH/DIAG INJ SC/IM: CPT

## 2017-08-13 PROCEDURE — 25000128 H RX IP 250 OP 636: Performed by: PHYSICIAN ASSISTANT

## 2017-08-13 PROCEDURE — 99214 OFFICE O/P EST MOD 30 MIN: CPT | Mod: 25

## 2017-08-13 RX ORDER — SUMATRIPTAN 6 MG/.5ML
6 INJECTION, SOLUTION SUBCUTANEOUS ONCE
Status: COMPLETED | OUTPATIENT
Start: 2017-08-13 | End: 2017-08-13

## 2017-08-13 RX ADMIN — SUMATRIPTAN SUCCINATE 6 MG: 6 INJECTION SUBCUTANEOUS at 15:16

## 2017-08-13 ASSESSMENT — ENCOUNTER SYMPTOMS
VOMITING: 0
MUSCULOSKELETAL NEGATIVE: 1
NAUSEA: 1
FATIGUE: 1
CARDIOVASCULAR NEGATIVE: 1
PSYCHIATRIC NEGATIVE: 1
HEADACHES: 1

## 2017-08-13 NOTE — ED NOTES
Patient presents with complaints of a migraine headache for the last few days.  Patient is drinking a Javed's frappe.  States she usually take oral Imitrex but has ran out.  States usually an Imitrex shot and that takes care of it.

## 2017-08-13 NOTE — ED AVS SNAPSHOT
[facility]    [address] [address]    Phone:  [phone]                                       Venu Amaya   MRN: [id number]    Department[facility]  [facility]   Date of Visit:  8/13/2017           After Visit Summary Signature Page     I have received my discharge instructions, and my questions have been answered. I have discussed any challenges I see with this plan with the nurse or doctor.    ..........................................................................................................................................  Patient/Patient Representative Signature      ..........................................................................................................................................  Patient Representative Print Name and Relationship to Patient    ..................................................               ................................................  Date                                            Time    ..........................................................................................................................................  Reviewed by Signature/Title    ...................................................              ..............................................  Date                                                            Time

## 2017-08-13 NOTE — ED PROVIDER NOTES
"  History     Chief Complaint   Patient presents with     Headache     x a few days     The history is provided by the patient. No  was used.     Venu Amaya is a 41 year old female who has throbbing/stabbing headache, across her forehead. It started yesterday. She states she had \"seen spots\" yesterday. First she though she was developing a sinus infection. So, she took sudafed. This did not help. She then took an Imitrex. This helped the headache.  However, it is worse today.  She does agree, this is her \"typical\" headache.  She had nausea but this has resolved.  Denies ear pain. No sore throat. No fever.    I have reviewed the Medications, Allergies, Past Medical and Surgical History, and Social History in the Epic system.    Allergies:   Allergies   Allergen Reactions     Amoxicillin Swelling     Hands swell      Levofloxacin Other (See Comments)     Unknown reaction - Levaquin      No Clinical Screening - See Comments      utapar drop in blood pressure     Penicillins      Ritodrine          No current facility-administered medications on file prior to encounter.   Current Outpatient Prescriptions on File Prior to Encounter:  clonazePAM (KLONOPIN) 1 MG tablet Take 1 mg by mouth   Multiple Vitamins-Minerals (MULTIVITAMIN ADULT PO) Take by mouth daily   ibuprofen (ADVIL,MOTRIN) 800 MG tablet TAKE 1 TABLET EVERY 8 HOURS AS NEEDED FOR MODERATE PAIN   acetaminophen-codeine (TYLENOL/CODEINE #3) 300-30 MG per tablet 1-2 tabs up to twice daily as needed.   [DISCONTINUED] PRENATAL VITAMINS PO Take 1 tablet by mouth daily.       Patient Active Problem List   Diagnosis     Abnormal pap     Anxiety state     Depressive disorder, not elsewhere classified     Migraine     Ankylosing spondylitis (H)     Cervicalgia     Dysthymic disorder     Myalgia and myositis     Chronic low back pain     Opioid dependence (H)     Headache     Long term current use of opiate analgesic       Past Surgical History: " "  Procedure Laterality Date     inguinal hernia repair, Germain repair  10/14/2008    RT      TUBAL LIGATION         Social History   Substance Use Topics     Smoking status: Light Tobacco Smoker     Packs/day: 0.25     Types: Cigarettes     Last attempt to quit: 7/20/2015     Smokeless tobacco: Never Used      Comment: Passive Exposure: Yes      Alcohol use No       Most Recent Immunizations   Administered Date(s) Administered     DTAP (<7y) 06/23/1982     MMR 07/20/1982     Pneumococcal 23 valent 03/21/2005     Poliovirus, inactivated (IPV) 08/29/1983     TD (ADULT, 7+) 08/29/1983       BMI: Estimated body mass index is 18.6 kg/(m^2) as calculated from the following:    Height as of 6/20/17: 1.6 m (5' 3\").    Weight as of 6/20/17: 47.6 kg (105 lb).      Review of Systems   Constitutional: Positive for fatigue.   Cardiovascular: Negative.    Gastrointestinal: Positive for nausea. Negative for vomiting.   Musculoskeletal: Negative.    Neurological: Positive for headaches.   Psychiatric/Behavioral: Negative.        Physical Exam   BP: 108/67  Heart Rate: 100  Temp: 98.6  F (37  C)  Resp: 16  SpO2: 100 %  Physical Exam   Constitutional: She is oriented to person, place, and time. She appears well-developed and well-nourished. No distress.   HENT:   Head: Normocephalic and atraumatic.   Eyes: Conjunctivae and EOM are normal. Pupils are equal, round, and reactive to light. Right eye exhibits no discharge. Left eye exhibits no discharge.   Cardiovascular: Regular rhythm and normal heart sounds.    tachycardia   Pulmonary/Chest: Effort normal and breath sounds normal. No respiratory distress.   Neurological: She is alert and oriented to person, place, and time. No cranial nerve deficit. Coordination normal.   Skin: She is not diaphoretic.   Psychiatric: She has a normal mood and affect. Her speech is normal and behavior is normal. Cognition and memory are normal.   Nursing note and vitals reviewed.      ED Course " "    ED Course     Procedures         Medications   SUMAtriptan (IMITREX) injection 6 mg (6 mg Subcutaneous Given 8/13/17 1516)   pt observed x 20 minutes. Pt tolerated well      Assessments & Plan (with Medical Decision Making)     I have reviewed the nursing notes.    I have reviewed the findings, diagnosis, plan and need for follow up with the patient.    Final diagnoses:   Intractable migraine, unspecified migraine type       Pt states she is out of her Imitrix tablets.  I asked her if she would like a refill. She said, \"My insurance will not pay for it. It's too soon.\" I asked her if there was any thing else I could do for her and she said, \"no.\" I offered phenergan, but the pt refused stating her nausea has resolved.    pt is on a pain contract with .     Patient verbally educated and given appropriate education sheets for the diagnoses and has no questions.  Take medications as directed.   Follow up with your Primary Care provider if symptoms increase or if concerns develop, return to the ER  Wendy Harkins Certified  Physician Assistant  8/13/2017  3:29 PM  URGENT CARE CLINIC      8/13/2017   [facility]     Wendy Harkins PA  08/13/17 1541    "

## 2017-08-13 NOTE — ED AVS SNAPSHOT
[facility]    [address] [address]    Phone:  [phone]                                       Venu Amaya   MRN: [id number]    Department:  [facility]   Date of Visit:  8/13/2017           Patient Information     Date Of Birth          1975        Your diagnoses for this visit were:     Intractable migraine, unspecified migraine type        You were seen by Wendy Harkins PA.      Follow-up Information     Follow up with Robin Segovia MD.    Specialty:  Family Practice    Why:  If symptoms worsen    Contact information:    [facility]  [address] [facility] [address] [phone]          Follow up with [facility].    Specialty:  EMERGENCY MEDICINE    Why:  If fever/further concerns develop    Contact information:    [address] [address] [phone]    Additional information:    From [facility]: Take [address]. Turn left at [address]. Turn left at the first stoplight on [address]. At the first stop sign, take a right onto [address]. Take a left into the parking lot and continue through until you reach the North enterance of the building.       From [place]: Take [address]. Take the [address] ramp towards [place]. Turn left onto [address]. Take a slight right onto [address]. Turn left at the first stoplight on [address]. At the first stop sign, take a right onto [address]. Take a left into the parking lot and continue through until you reach the North enterance of the building.       From [place]: Take [address]. Take a right at [address]. At the first stop sign, take a right onto [address]. Take a left into the parking lot and continue through until you reach the North enterance of the building.       Discharge References/Attachments     HEADACHE, MIGRAINE (CLASSICAL) (ENGLISH)     "HEADACHES, MIGRAINES AND CLUSTER (ENGLISH)    MIGRAINE AND TENSION HEADACHES, WHAT ARE? (ENGLISH)         Review of your medicines      Our records show that you are taking the medicines listed below. If these are incorrect, please call your family doctor or clinic.        Dose / Directions Last dose taken    acetaminophen-codeine 300-30 MG per tablet   Commonly known as:  TYLENOL/codeine #3   Quantity:  60 tablet        1-2 tabs up to twice daily as needed.   Refills:  0        clonazePAM 1 MG tablet   Commonly known as:  klonoPIN   Dose:  1 mg        Take 1 mg by mouth   Refills:  0        ibuprofen 800 MG tablet   Commonly known as:  ADVIL/MOTRIN   Quantity:  60 tablet        TAKE 1 TABLET EVERY 8 HOURS AS NEEDED FOR MODERATE PAIN   Refills:  5        IMITREX PO   Dose:  100 mg        Take 100 mg by mouth at onset of headache for migraine May repeat once   Refills:  0        MULTIVITAMIN ADULT PO        Take by mouth daily   Refills:  0                Orders Needing Specimen Collection     None      Pending Results     No orders found from 8/11/2017 to 8/14/2017.            Pending Culture Results     No orders found from 8/11/2017 to 8/14/2017.            Thank you for choosing [facility]       Thank you for choosing [facility] for your care. Our goal is always to provide you with excellent care. Hearing back from our patients is one way we can continue to improve our services. Please take a few minutes to complete the written survey that you may receive in the mail after you visit with us. Thank you!        MyScreen Information     MyScreen lets you send messages to your doctor, view your test results, renew your prescriptions, schedule appointments and more. To sign up, go to [email or web address] . Click on \"Log in\" on the left side of the screen, which will take you to the Welcome page. Then click on \"Sign up Now\" on the right side of the page.     You will be asked to enter the access code listed below, as " well as some personal information. Please follow the directions to create your username and password.     Your access code is: [email or web address]  Expires: 10/13/2017  2:22 AM     Your access code will  in 90 days. If you need help or a new code, please call your [facility] clinic or [phone].        Care EveryWhere ID     This is your Care EveryWhere ID. This could be used by other organizations to access your [facility] medical records  [id number]        Equal Access to Services     KING TREVINO : Buddy patricia Somanoj, warose lumary bethadaha, qatip kaalmaphillip ashton, erik duenas . [facility] [phone].    ATENCIÓN: Si habla español, tiene a baltazar disposición servicios gratuitos de asistencia lingüística. Llame al [phone].    We comply with applicable federal civil rights laws and [place] laws. We do not discriminate on the basis of race, color, national origin, age, disability sex, sexual orientation or gender identity.            After Visit Summary       This is your record. Keep this with you and show to your community pharmacist(s) and doctor(s) at your next visit.

## 2017-08-14 ENCOUNTER — HOSPITAL ENCOUNTER (EMERGENCY)
Facility: HOSPITAL | Age: 42
Discharge: HOME | End: 2017-08-14
Attending: NURSE PRACTITIONER | Admitting: NURSE PRACTITIONER
Payer: COMMERCIAL

## 2017-08-14 VITALS
OXYGEN SATURATION: 98 % | TEMPERATURE: 98.1 F | RESPIRATION RATE: 16 BRPM | HEIGHT: 63 IN | SYSTOLIC BLOOD PRESSURE: 100 MMHG | DIASTOLIC BLOOD PRESSURE: 67 MMHG

## 2017-08-14 DIAGNOSIS — G43.109 MIGRAINE WITH AURA AND WITHOUT STATUS MIGRAINOSUS, NOT INTRACTABLE: ICD-10-CM

## 2017-08-14 PROCEDURE — 99213 OFFICE O/P EST LOW 20 MIN: CPT | Performed by: NURSE PRACTITIONER

## 2017-08-14 PROCEDURE — 99214 OFFICE O/P EST MOD 30 MIN: CPT | Mod: 25

## 2017-08-14 PROCEDURE — 96372 THER/PROPH/DIAG INJ SC/IM: CPT

## 2017-08-14 PROCEDURE — 25000128 H RX IP 250 OP 636: Performed by: NURSE PRACTITIONER

## 2017-08-14 RX ORDER — SUMATRIPTAN 6 MG/.5ML
6 INJECTION, SOLUTION SUBCUTANEOUS ONCE
Status: COMPLETED | OUTPATIENT
Start: 2017-08-14 | End: 2017-08-14

## 2017-08-14 RX ADMIN — SUMATRIPTAN 6 MG: 6 INJECTION, SOLUTION SUBCUTANEOUS at 12:15

## 2017-08-14 ASSESSMENT — ENCOUNTER SYMPTOMS
NUMBNESS: 0
EYE PAIN: 0
EYES NEGATIVE: 1
SPEECH DIFFICULTY: 0
CONSTITUTIONAL NEGATIVE: 1
HEADACHES: 1
FEVER: 0
RESPIRATORY NEGATIVE: 1
DIZZINESS: 0
WEAKNESS: 0

## 2017-08-14 NOTE — DISCHARGE INSTRUCTIONS
Preventing Migraine Headaches: Triggers     Red wine is a common migraine trigger.     The first step in preventing migraines is to learn what triggers them. You may then be able to control your triggers to avoid or reduce the severity of your migraines.  Know your triggers  Be aware that you may have more than one trigger, and that some triggers may work together. Common migraine triggers include:    Food and nutrition. Skipping meals or not drinking enough water can trigger headaches. So can certain foods, such as caffeine, monosodium glutamate (MSG), aged cheese, or sausage.    Alcohol. Red wine and other alcoholic beverages are common migraine triggers.    Chemicals. Scents, cleaning products, gasoline, glue, perfume, and paint can be triggers. So can tobacco smoke, including secondhand smoke.    Emotions. Stress can trigger headaches or make them worse once they begin.    Sleep disruption. Staying up late, sleeping late, and traveling across time zones can disrupt your sleep cycle, triggering headaches.    Hormones. Many women notice that migraines tend to happen at a certain point in their menstrual cycle. Birth control pills or hormone replacement therapy may also trigger migraines.    Environment and weather. Air travel, changes in altitude, air pressure changes, hot sun, or bright or flashing lights can be triggers.  Control your triggers  These are some of the things you can do to try to control triggers:    Avoid triggers if you can. For example, stay clear of alcohol and foods that trigger your headaches. Use unscented household products. Keep regular sleep habits. Manage stress to help control emotional triggers.    Change your behavior at times when triggers can't be avoided. For example, make sure to get enough rest and drink plenty of water while you're traveling. Make sure to carry a hat, sunglasses, and your medicines. Be alert for migraine symptoms, so you can treat a migraine early if it  happens.  Date Last Reviewed: 10/9/2015    5380-3337 [employer], [employer]. [address] [place] [address], [place] [address]. All rights reserved. This information is not intended as a substitute for professional medical care. Always follow your healthcare professional's instructions.

## 2017-08-14 NOTE — ED NOTES
Pt is here alone. She states she was in ER yesterday for a migraine at 2pm. She was given a shot imitrex which helped her pain. She states that migraine came on very suddenly again today around 0930. Patient would like a shot of imitrex. She thinks her migraines coming on are due to her menstrual cycle. Pain level 9/10 at this time.

## 2017-08-14 NOTE — ED AVS SNAPSHOT
[facility]    [address] [address]    Phone:  [phone]                                       Venu Amaya   MRN: [id number]    Department:  [facility]   Date of Visit:  8/14/2017           After Visit Summary Signature Page     I have received my discharge instructions, and my questions have been answered. I have discussed any challenges I see with this plan with the nurse or doctor.    ..........................................................................................................................................  Patient/Patient Representative Signature      ..........................................................................................................................................  Patient Representative Print Name and Relationship to Patient    ..................................................               ................................................  Date                                            Time    ..........................................................................................................................................  Reviewed by Signature/Title    ...................................................              ..............................................  Date                                                            Time

## 2017-08-14 NOTE — ED PROVIDER NOTES
"  History     Chief Complaint   Patient presents with     Headache     \"I just want a shot of imitrex:. Stated was here yesterday. States does not want to wait for ER work up.     The history is provided by the patient. No  was used.     Venu Amaya is a 42 year old female who reports a migraine started about 0900 and she took excedrin which didn't help and then she realized that was about 1.5 weeks pre menstrual . Today she had an aura right before the HA started , it always starts at the back of her head and then radiates up to her forehead and perists there , throbbing.  She does get an aura with her migraines today she saw black spots.  This is not the worst HA that she has ever had.  She has had increasing frequency of these migraines and did have one yesterday which was treated successfully with imitrex injection.  She would like this again today .  She is having photosensitivity and phonosensitivity, mild nausea.  She is out of the quantity of the PO imitrex she can get per month she is about 2 weeks away from being able to refill this again. The excedrin migraine does not usually work for her.    I have reviewed the Medications, Allergies, Past Medical and Surgical History, and Social History in the Epic system.      Review of Systems   Constitutional: Negative.  Negative for fever.   HENT: Negative for congestion.    Eyes: Negative.  Negative for pain.   Respiratory: Negative.    Genitourinary: Negative.    Neurological: Positive for headaches (throbbing across the front of her head bilaterally). Negative for dizziness, speech difficulty, weakness and numbness.       Physical Exam   BP: 100/67  Heart Rate: 89  Temp: 98.1  F (36.7  C)  Resp: 16  Height: 160 cm (5' 3\")  SpO2: 98 %  Physical Exam   Constitutional: She is oriented to person, place, and time. No distress.   Thin, non toxic appearing female   HENT:   Head: Normocephalic and atraumatic.   Mouth/Throat: Oropharynx is clear " and moist.   Eyes: Conjunctivae and EOM are normal. Pupils are equal, round, and reactive to light. Right eye exhibits no discharge. Left eye exhibits no discharge.   Neck: Normal range of motion. Neck supple.   Cardiovascular: Normal rate, regular rhythm and normal heart sounds.  Exam reveals no gallop and no friction rub.    No murmur heard.  Pulmonary/Chest: Effort normal and breath sounds normal. She has no wheezes. She has no rales.   Abdominal: Soft. Bowel sounds are normal. She exhibits no mass. There is no tenderness. There is no rebound and no guarding.   No CVA tenderness   Musculoskeletal: Normal range of motion.   Lymphadenopathy:     She has no cervical adenopathy.   Neurological: She is alert and oriented to person, place, and time. No cranial nerve deficit. She exhibits normal muscle tone. Coordination normal.   Skin: Skin is warm and dry. She is not diaphoretic.   Psychiatric: Her behavior is normal.   Nursing note and vitals reviewed.      ED Course     ED Course     Procedures             Medications   SUMAtriptan (IMITREX) injection 6 mg (6 mg Subcutaneous Given 8/14/17 1215)     Relief of HA from 7 to 3, she feels much better    Labs Ordered and Resulted from Time of ED Arrival Up to the Time of Departure from the ED - No data to display    Assessments & Plan (with Medical Decision Making)     I have reviewed the nursing notes.    Pathophysiology, possible etiology and treatment with potential outcomes, risks, benefits, and alternatives discussed to the best of my ability      She is aware of the triggers for this series of migraine/ f/u with PCP  I have reviewed the findings, diagnosis, plan and need for follow up with the patient.      Discharge Medication List as of 8/14/2017 12:39 PM          Final diagnoses:   Migraine with aura and without status migrainosus, not intractable     Pt verbalizes understanding and agreement with plan.  Follow up for worsening symptoms or new  concerns    8/14/2017   [facility]     Jennifer Meyers NP  08/14/17 9626

## 2017-08-14 NOTE — ED AVS SNAPSHOT
[facility]    [address]    [facility] [address]    Phone:  [phone]                                       Venu Amaya   MRN: [id number]    Department:  [facility]   Date of Visit:  8/14/2017           Patient Information     Date Of Birth          1975        Your diagnoses for this visit were:     Migraine with aura and without status migrainosus, not intractable        You were seen by Jennifer Meyers NP.      Follow-up Information     Follow up with Robin Segovia MD.    Specialty:  Family Practice    Why:  As needed, If symptoms worsen    Contact information:    [facility]  [address] [facility] [address] [phone]          Follow up with [facility].    Specialty:  EMERGENCY MEDICINE    Why:  As needed, If symptoms worsen    Contact information:    [address] [address] [phone]    Additional information:    From [place]: Take [address]. Turn left at [address]. Turn left at the first stoplight on [address]. At the first stop sign, take a right onto [address]. Take a left into the parking lot and continue through until you reach the North enterance of the building.       From [place]: Take [address]. Take the [address] ramp towards [place]. Turn left onto [address]. Take a slight right onto [address]. Turn left at the first stoplight on [address]. At the first stop sign, take a right onto [address]. Take a left into the parking lot and continue through until you reach the North enterance of the building.       From [place]: Take [address]. Take a right at [address]. At the first stop sign, take a right onto [address]. Take a left into the parking lot and continue through until you reach the North enterance of the building.         Discharge Instructions         Preventing Migraine Headaches: Triggers      Red wine is a common migraine trigger.     The first step in preventing migraines is to learn what triggers them. You may then be able to control your triggers to avoid or reduce the severity of your migraines.  Know your triggers  Be aware that you may have more than one trigger, and that some triggers may work together. Common migraine triggers include:    Food and nutrition. Skipping meals or not drinking enough water can trigger headaches. So can certain foods, such as caffeine, monosodium glutamate (MSG), aged cheese, or sausage.    Alcohol. Red wine and other alcoholic beverages are common migraine triggers.    Chemicals. Scents, cleaning products, gasoline, glue, perfume, and paint can be triggers. So can tobacco smoke, including secondhand smoke.    Emotions. Stress can trigger headaches or make them worse once they begin.    Sleep disruption. Staying up late, sleeping late, and traveling across time zones can disrupt your sleep cycle, triggering headaches.    Hormones. Many women notice that migraines tend to happen at a certain point in their menstrual cycle. Birth control pills or hormone replacement therapy may also trigger migraines.    Environment and weather. Air travel, changes in altitude, air pressure changes, hot sun, or bright or flashing lights can be triggers.  Control your triggers  These are some of the things you can do to try to control triggers:    Avoid triggers if you can. For example, stay clear of alcohol and foods that trigger your headaches. Use unscented household products. Keep regular sleep habits. Manage stress to help control emotional triggers.    Change your behavior at times when triggers can't be avoided. For example, make sure to get enough rest and drink plenty of water while you're traveling. Make sure to carry a hat, sunglasses, and your medicines. Be alert for migraine symptoms, so you can treat a migraine early if it happens.  Date Last Reviewed: 10/9/2015     "1486-0916 [employer]. [address] [place] [address], [place] [address]. All rights reserved. This information is not intended as a substitute for professional medical care. Always follow your healthcare professional's instructions.             Review of your medicines      Our records show that you are taking the medicines listed below. If these are incorrect, please call your family doctor or clinic.        Dose / Directions Last dose taken    acetaminophen-codeine 300-30 MG per tablet   Commonly known as:  TYLENOL/codeine #3   Quantity:  60 tablet        1-2 tabs up to twice daily as needed.   Refills:  0        clonazePAM 1 MG tablet   Commonly known as:  klonoPIN   Dose:  1 mg        Take 1 mg by mouth   Refills:  0        ibuprofen 800 MG tablet   Commonly known as:  ADVIL/MOTRIN   Quantity:  60 tablet        TAKE 1 TABLET EVERY 8 HOURS AS NEEDED FOR MODERATE PAIN   Refills:  5        IMITREX PO   Dose:  100 mg        Take 100 mg by mouth at onset of headache for migraine May repeat once   Refills:  0        MULTIVITAMIN ADULT PO        Take by mouth daily   Refills:  0                Orders Needing Specimen Collection     None      Pending Results     No orders found from 8/12/2017 to 8/15/2017.            Pending Culture Results     No orders found from 8/12/2017 to 8/15/2017.            Thank you for choosing [facility]       Thank you for choosing [facility] for your care. Our goal is always to provide you with excellent care. Hearing back from our patients is one way we can continue to improve our services. Please take a few minutes to complete the written survey that you may receive in the mail after you visit with us. Thank you!        [employer] Information     [employer] lets you send messages to your doctor, view your test results, renew your prescriptions, schedule appointments and more. To sign up, go to [email or web address] . Click on \"Log in\" on the left side of the screen, which will take you " "to the Welcome page. Then click on \"Sign up Now\" on the right side of the page.     You will be asked to enter the access code listed below, as well as some personal information. Please follow the directions to create your username and password.     Your access code is: [email or web address]  Expires: 10/13/2017  2:22 AM     Your access code will  in 90 days. If you need help or a new code, please call your [facility] clinic or [phone].        Care EveryWhere ID     This is your Care EveryWhere ID. This could be used by other organizations to access your [facility] medical records  [id number]        Equal Access to Services     KING TREVINO : Buddy Magana, lukas ramsey, caroline ashton, erik garnica. So [facility] [phone].    ATENCIÓN: Si habla español, tiene a baltazar disposición servicios gratuitos de asistencia lingüística. Llame al [phone].    We comply with applicable federal civil rights laws and [place] laws. We do not discriminate on the basis of race, color, national origin, age, disability sex, sexual orientation or gender identity.            After Visit Summary       This is your record. Keep this with you and show to your community pharmacist(s) and doctor(s) at your next visit.                  "

## 2017-08-16 ENCOUNTER — HOSPITAL ENCOUNTER (EMERGENCY)
Facility: HOSPITAL | Age: 42
Discharge: HOME | End: 2017-08-16
Attending: EMERGENCY MEDICINE | Admitting: EMERGENCY MEDICINE
Payer: COMMERCIAL

## 2017-08-16 VITALS
RESPIRATION RATE: 16 BRPM | BODY MASS INDEX: 19.49 KG/M2 | OXYGEN SATURATION: 99 % | WEIGHT: 110 LBS | TEMPERATURE: 98.2 F | DIASTOLIC BLOOD PRESSURE: 85 MMHG | HEART RATE: 86 BPM | SYSTOLIC BLOOD PRESSURE: 117 MMHG

## 2017-08-16 DIAGNOSIS — G43.909 MIGRAINE WITHOUT STATUS MIGRAINOSUS, NOT INTRACTABLE, UNSPECIFIED MIGRAINE TYPE: ICD-10-CM

## 2017-08-16 PROCEDURE — 99283 EMERGENCY DEPT VISIT LOW MDM: CPT

## 2017-08-16 PROCEDURE — 99283 EMERGENCY DEPT VISIT LOW MDM: CPT | Performed by: EMERGENCY MEDICINE

## 2017-08-16 PROCEDURE — 25000128 H RX IP 250 OP 636: Performed by: EMERGENCY MEDICINE

## 2017-08-16 RX ORDER — SUMATRIPTAN 6 MG/.5ML
6 INJECTION, SOLUTION SUBCUTANEOUS ONCE
Status: COMPLETED | OUTPATIENT
Start: 2017-08-16 | End: 2017-08-16

## 2017-08-16 RX ADMIN — SUMATRIPTAN 6 MG: 6 INJECTION, SOLUTION SUBCUTANEOUS at 01:37

## 2017-08-16 ASSESSMENT — ENCOUNTER SYMPTOMS
VOMITING: 0
HEADACHES: 1
CONSTITUTIONAL NEGATIVE: 1
DIARRHEA: 0
SLEEP DISTURBANCE: 1
LIGHT-HEADEDNESS: 0
RESPIRATORY NEGATIVE: 1
NAUSEA: 1
ADENOPATHY: 0
ABDOMINAL PAIN: 0
CONFUSION: 0
FACIAL ASYMMETRY: 0
SPEECH DIFFICULTY: 0

## 2017-08-16 NOTE — ED NOTES
"In ED for \"having a migraine headache for 5 days. Wants an Imitrex shot.  Is about 1 week before menstrual period.\"   "

## 2017-08-16 NOTE — ED AVS SNAPSHOT
[facility]    [address] [address]    Phone:  [phone]                                       Venu Amaya   MRN: [id number]    Department:  [facility]   Date of Visit:  8/16/2017           After Visit Summary Signature Page     I have received my discharge instructions, and my questions have been answered. I have discussed any challenges I see with this plan with the nurse or doctor.    ..........................................................................................................................................  Patient/Patient Representative Signature      ..........................................................................................................................................  Patient Representative Print Name and Relationship to Patient    ..................................................               ................................................  Date                                            Time    ..........................................................................................................................................  Reviewed by Signature/Title    ...................................................              ..............................................  Date                                                            Time

## 2017-08-16 NOTE — DISCHARGE INSTRUCTIONS
With frequent headaches, consider talking with your doctor about going on preventive medication to help preempt recurrence of the frequent headaches.    Do everything else to make her life is healthy as possible, avoid smoking or smoke exposure, eat a balanced diet with plenty of fruits and vegetables and get exercise regularly.

## 2017-08-16 NOTE — ED PROVIDER NOTES
"  History     Chief Complaint   Patient presents with     Headache     \"migrane for 5 days, wants imitrex shot\"     [facility] Comments: 01.23  Drove herself here    Chief complaint  Migraine    History of present illness  42-year-old female with recurrent headaches, often associated with nausea and occasionally vomiting, also photophobia.  Throbbing frontal.  Has been diagnosed with migraine, they're usually precipitated by her menstrual cycle coming on  At times a been quite frequent as they have been in the last 2 weeks.  She's had 3 visits to emergency recently.  They do respond to Imitrex.  She states she's been on \"everything\" but is unclear if she's really had a full pile of preventative medications.  However she can also go weeks without getting headaches.    Current headache is similar to previous, no vomiting no fever, no difficulty starting or walking    The history is provided by the patient and medical records.     Past Medical History:   Diagnosis Date     Abnormal Pap smear and cervical HPV (human papillo 05/25/2011     Ankylosing spondylitis (H) 05/24/2012    Flori Finch PAC      Anxiety state, unspecified 06/02/2009     Cervicalgia pain (neck) 11/03/2011     Chronic Headache disorder  05/25/2011     Depressive disorder, not elsewhere classified 10/31/2007     Dysmenorrhea 04/03/2012     Habitual aborter, antepartum condition or complica 02/10/2005    Christa Tabares MD      Inguinal hernia without mention of obstruction or gangrene, unilateral or unspecified, (not specified as recurrent) 06/16/2008     Irregular menstrual cycle 05/25/2011     Mental disorders of mother, postpartum 10/06/2005     Migraine, unspecified, without mention of intractable migraine without mention of status migrainosus 01/15/2003     Moderate dysplasia of cervix 11/03/2011     Pain in thoracic spine 11/03/2011     Scoliosis (and kyphoscoliosis), idiopathic 05/16/2011    Abdiel Nolan MD      Past Surgical History: "   Procedure Laterality Date     inguinal hernia repair, Germain repair  10/14/2008    RT      TUBAL LIGATION          Allergies   Allergen Reactions     Amoxicillin Swelling     Hands swell      Levofloxacin Other (See Comments)     Unknown reaction - Levaquin      No Clinical Screening - See Comments      utapar drop in blood pressure     Penicillins      Ritodrine      Current Facility-Administered Medications   Medication     SUMAtriptan (IMITREX) injection 6 mg     Current Outpatient Prescriptions   Medication     SUMAtriptan Succinate (IMITREX PO)     clonazePAM (KLONOPIN) 1 MG tablet     Multiple Vitamins-Minerals (MULTIVITAMIN ADULT PO)     ibuprofen (ADVIL,MOTRIN) 800 MG tablet     acetaminophen-codeine (TYLENOL/CODEINE #3) 300-30 MG per tablet     [DISCONTINUED] PRENATAL VITAMINS PO         Review of Systems   Constitutional: Negative.    Respiratory: Negative.    Gastrointestinal: Positive for nausea. Negative for abdominal pain, diarrhea and vomiting.   Skin: Negative for rash.   Neurological: Positive for headaches. Negative for syncope, facial asymmetry, speech difficulty and light-headedness.   Hematological: Negative for adenopathy.   Psychiatric/Behavioral: Positive for sleep disturbance. Negative for confusion.       Physical Exam   BP: (!) 102/43  Pulse: 86  Temp: 98.3  F (36.8  C)  Resp: 16  Weight: 49.9 kg (110 lb)  SpO2: 97 %  Physical Exam   Constitutional: She is oriented to person, place, and time. She appears well-developed and well-nourished. No distress.   Alert and showing discomfort but no difficulty speaking breathing or moving  Vital signs within normal   HENT:   Head: Normocephalic.   Mouth/Throat: Oropharynx is clear and moist.   Eyes: Conjunctivae are normal. Pupils are equal, round, and reactive to light.   Cardiovascular: Normal rate and regular rhythm.    Pulmonary/Chest: Effort normal. No respiratory distress.   Musculoskeletal: Normal range of motion.   Neurological: She  is alert and oriented to person, place, and time. She exhibits normal muscle tone.   Skin: Skin is warm and dry. No rash noted. She is not diaphoretic.   Psychiatric: Her behavior is normal.   Nursing note and vitals reviewed.      ED Course     ED Course     Procedures        Exam  Imitrex 6 mg subcutaneously         Labs Ordered and Resulted from Time of ED Arrival Up to the Time of Departure from the ED - No data to display    Assessments & Plan (with Medical Decision Making)     I have reviewed the nursing notes.    I have reviewed the findings, diagnosis, plan and need for follow up with the patient.  42-year-old female with episodes of frequent migraines, usually associated with her menses.  Responds well to sumatripan but has taken more than the recommended doses at times    New Prescriptions    No medications on file       Final diagnoses:   Migraine without status migrainosus, not intractable, unspecified migraine type     Instructions      With frequent headaches, consider talking with your doctor about going on preventive medication to help preempt recurrence of the frequent headaches.     Do everything else to make her life is healthy as possible, avoid smoking or smoke exposure, eat a balanced diet with plenty of fruits and vegetables and get exercise regularly.            8/16/2017   [facility]     Home Enriquez MD  08/16/17 0254

## 2017-08-16 NOTE — ED AVS SNAPSHOT
[facility]    [address] [place] [address]    Phone:  [phone]                                       Venu Amaya   MRN: [id number]    Department:  [facility]   Date of Visit:  8/16/2017           Patient Information     Date Of Birth          1975        Your diagnoses for this visit were:     Migraine without status migrainosus, not intractable, unspecified migraine type        You were seen by Home Enriquez MD.        Discharge Instructions       With frequent headaches, consider talking with your doctor about going on preventive medication to help preempt recurrence of the frequent headaches.    Do everything else to make her life is healthy as possible, avoid smoking or smoke exposure, eat a balanced diet with plenty of fruits and vegetables and get exercise regularly.       Review of your medicines      Our records show that you are taking the medicines listed below. If these are incorrect, please call your family doctor or clinic.        Dose / Directions Last dose taken    acetaminophen-codeine 300-30 MG per tablet   Commonly known as:  TYLENOL/codeine #3   Quantity:  60 tablet        1-2 tabs up to twice daily as needed.   Refills:  0        clonazePAM 1 MG tablet   Commonly known as:  klonoPIN   Dose:  1 mg        Take 1 mg by mouth   Refills:  0        ibuprofen 800 MG tablet   Commonly known as:  ADVIL/MOTRIN   Quantity:  60 tablet        TAKE 1 TABLET EVERY 8 HOURS AS NEEDED FOR MODERATE PAIN   Refills:  5        IMITREX PO   Dose:  100 mg        Take 100 mg by mouth at onset of headache for migraine May repeat once   Refills:  0        MULTIVITAMIN ADULT PO        Take by mouth daily   Refills:  0                Orders Needing Specimen Collection     None      Pending Results     No orders found from 8/14/2017 to 8/17/2017.            Pending Culture Results     No orders found from 8/14/2017 to 8/17/2017.            Thank you for choosing  "[facility]       Thank you for choosing [facility] for your care. Our goal is always to provide you with excellent care. Hearing back from our patients is one way we can continue to improve our services. Please take a few minutes to complete the written survey that you may receive in the mail after you visit with us. Thank you!        [employer] Information     [employer] lets you send messages to your doctor, view your test results, renew your prescriptions, schedule appointments and more. To sign up, go to [email or web address] . Click on \"Log in\" on the left side of the screen, which will take you to the [facility] page. Then click on \"Sign up Now\" on the right side of the page.     You will be asked to enter the access code listed below, as well as some personal information. Please follow the directions to create your username and password.     Your access code is: [email or web address]  Expires: 10/13/2017  2:22 AM     Your access code will  in 90 days. If you need help or a new code, please call your [facility] clinic or [phone].        Care EveryWhere ID     This is your Care EveryWhere ID. This could be used by other organizations to access your [facility] medical records  [id number]        Equal Access to Services     KING TREVINO : Buddy wardo Izzy, waaxda lumary bethadaha, qaybta kaalmada adetheron, erik garnica. So [facility] [phone].    ATENCIÓN: Si habla español, tiene a baltazar disposición servicios gratuitos de asistencia lingüística. Llame al [phone].    We comply with applicable federal civil rights laws and [place] laws. We do not discriminate on the basis of race, color, national origin, age, disability sex, sexual orientation or gender identity.            After Visit Summary       This is your record. Keep this with you and show to your community pharmacist(s) and doctor(s) at your next visit.                  "

## 2017-09-24 ENCOUNTER — HOSPITAL ENCOUNTER (EMERGENCY)
Facility: HOSPITAL | Age: 42
Discharge: HOME | End: 2017-09-24
Attending: EMERGENCY MEDICINE | Admitting: EMERGENCY MEDICINE
Payer: COMMERCIAL

## 2017-09-24 VITALS
HEART RATE: 102 BPM | TEMPERATURE: 97 F | SYSTOLIC BLOOD PRESSURE: 116 MMHG | RESPIRATION RATE: 16 BRPM | DIASTOLIC BLOOD PRESSURE: 83 MMHG | OXYGEN SATURATION: 98 %

## 2017-09-24 DIAGNOSIS — G43.109 MIGRAINE WITH AURA AND WITHOUT STATUS MIGRAINOSUS, NOT INTRACTABLE: Primary | ICD-10-CM

## 2017-09-24 PROCEDURE — 99283 EMERGENCY DEPT VISIT LOW MDM: CPT | Performed by: EMERGENCY MEDICINE

## 2017-09-24 PROCEDURE — 99283 EMERGENCY DEPT VISIT LOW MDM: CPT

## 2017-09-24 PROCEDURE — 25000128 H RX IP 250 OP 636: Performed by: EMERGENCY MEDICINE

## 2017-09-24 RX ORDER — SUMATRIPTAN 6 MG/.5ML
6 INJECTION, SOLUTION SUBCUTANEOUS ONCE
Status: COMPLETED | OUTPATIENT
Start: 2017-09-24 | End: 2017-09-24

## 2017-09-24 RX ADMIN — SUMATRIPTAN 6 MG: 6 INJECTION, SOLUTION SUBCUTANEOUS at 06:28

## 2017-09-24 ASSESSMENT — ENCOUNTER SYMPTOMS
DIZZINESS: 0
HEADACHES: 1
NUMBNESS: 0
SEIZURES: 0
LIGHT-HEADEDNESS: 0
FACIAL ASYMMETRY: 0

## 2017-09-24 NOTE — ED PROVIDER NOTES
History     Chief Complaint   Patient presents with     Headache     HPI  Venu Amaya is a 42 year old female who presents to the emergency department with a 2 day history of headache.  Patient is known to have long history of migraines and has been out of Imitrex for a few days.  She is requesting Imitrex injection.  She can refill oral Imitrex home tomorrow.  The headache is global, associated with aura of flashing lights.  She vomited once yesterday.  Denies any blurring of vision or double vision.  No neck pains or stiffness.  Denies any unilateral body weakness.    I have reviewed the Medications, Allergies, Past Medical and Surgical History, and Social History in the Epic system.    Allergies:   Allergies   Allergen Reactions     Amoxicillin Swelling     Hands swell      Levofloxacin Other (See Comments)     Unknown reaction - Levaquin      No Clinical Screening - See Comments      utapar drop in blood pressure     Penicillins      Ritodrine          No current facility-administered medications on file prior to encounter.   Current Outpatient Prescriptions on File Prior to Encounter:  SUMAtriptan Succinate (IMITREX PO) Take 100 mg by mouth at onset of headache for migraine May repeat once   clonazePAM (KLONOPIN) 1 MG tablet Take 1 mg by mouth   Multiple Vitamins-Minerals (MULTIVITAMIN ADULT PO) Take by mouth daily   ibuprofen (ADVIL,MOTRIN) 800 MG tablet TAKE 1 TABLET EVERY 8 HOURS AS NEEDED FOR MODERATE PAIN   acetaminophen-codeine (TYLENOL/CODEINE #3) 300-30 MG per tablet 1-2 tabs up to twice daily as needed.   [DISCONTINUED] PRENATAL VITAMINS PO Take 1 tablet by mouth daily.       Patient Active Problem List   Diagnosis     Abnormal pap     Anxiety state     Depressive disorder, not elsewhere classified     Migraine     Ankylosing spondylitis (H)     Cervicalgia     Dysthymic disorder     Myalgia and myositis     Chronic low back pain     Opioid dependence (H)     Headache     Long term current use  "of opiate analgesic       Past Surgical History:   Procedure Laterality Date     inguinal hernia repair, Germain repair  10/14/2008    RT      TUBAL LIGATION         Social History   Substance Use Topics     Smoking status: Light Tobacco Smoker     Packs/day: 0.25     Types: Cigarettes     Last attempt to quit: 7/20/2015     Smokeless tobacco: Never Used      Comment: Passive Exposure: Yes      Alcohol use No       Most Recent Immunizations   Administered Date(s) Administered     DTAP (<7y) 06/23/1982     MMR 07/20/1982     Pneumococcal 23 valent 03/21/2005     Poliovirus, inactivated (IPV) 08/29/1983     TD (ADULT, 7+) 08/29/1983       BMI: Estimated body mass index is 19.49 kg/(m^2) as calculated from the following:    Height as of 8/14/17: 1.6 m (5' 3\").    Weight as of 8/16/17: 49.9 kg (110 lb).        Review of Systems   Neurological: Positive for headaches. Negative for dizziness, seizures, facial asymmetry, light-headedness and numbness.   All other systems reviewed and are negative.      Physical Exam   BP: 94/66  Pulse: 102  Temp: 97  F (36.1  C)  Resp: 16  SpO2: 98 %  Physical Exam   Constitutional: She is oriented to person, place, and time. She appears well-developed and well-nourished. No distress.   HENT:   Head: Normocephalic and atraumatic.   Mouth/Throat: Oropharynx is clear and moist. No oropharyngeal exudate.   Eyes: Pupils are equal, round, and reactive to light. No scleral icterus.   Cardiovascular: Normal rate, regular rhythm, normal heart sounds and intact distal pulses.    Pulmonary/Chest: Breath sounds normal. No respiratory distress. She has no wheezes. She has no rales.   Abdominal: Soft. Bowel sounds are normal. There is no tenderness. There is no rebound and no guarding.   Musculoskeletal: She exhibits no edema or tenderness.   Neurological: She is alert and oriented to person, place, and time.   Skin: Skin is warm. No rash noted. She is not diaphoretic.   Nursing note and vitals " reviewed.      ED Course   Imitrex injection given in the ED with good relief of headache.    ED Course     Procedures         Labs Ordered and Resulted from Time of ED Arrival Up to the Time of Departure from the ED - No data to display    Assessments & Plan (with Medical Decision Making)   Migraine headache: Patient headache improved after Imitrex injection in the emergency department.  She has Imitrex refilled at the pharmacy which she can pick from tomorrow.  Patient subsequently discharged home and will continue Imitrex as needed for headache.  Follow-up with PCP as needed.  All questions answered.    I have reviewed the nursing notes.    I have reviewed the findings, diagnosis, plan and need for follow up with the patient.    Discharge Medication List as of 9/24/2017  6:47 AM          Final diagnoses:   Migraine with aura and without status migrainosus, not intractable       9/24/2017   [facility]     Lucas Brock MD  09/24/17 0731

## 2017-09-24 NOTE — ED AVS SNAPSHOT
[facility]    [address] [address]    Phone:  [phone]                                       Venu Amaya   MRN: [id number]    Department:  [facility]   Date of Visit:  9/24/2017           Patient Information     Date Of Birth          1975        Your diagnoses for this visit were:     Migraine with aura and without status migrainosus, not intractable        You were seen by Lucas Brock MD.      Follow-up Information     Follow up with Robin Segovia MD.    Specialty:  Family Practice    Why:  As needed    Contact information:    [facility]  [address] [facility] [address]  [phone]          Discharge Instructions         * Migraine Headache  Migraine headaches are related to changes in blood flow to the brain. This causes throbbing or constant pain on one or both sides of the head. The pain may last from a few hours to several days. There is usually nausea, vomiting, sensitivity to light and sound, and blurred vision. A migraine attack may be triggered by emotional stress, hormone changes during the menstrual cycle, oral contraceptives, alcohol use, certain foods containing tyramine, eye strain, weather changes, missing meals, or too little or too much sleep.  Home Care For This Headache:  1) If you were given pain medicine for this headache, do not drive yourself home . Arrange for a ride, instead. When you get home, try to sleep. You should feel much better when you wake up.  2) Migraine headaches may improve with an ice pack on the forehead or at the base of the skull. Heat to the back of your neck may relieve any neck spasm.  3) Drink only clear liquids or eat a very light diet to avoid nausea/vomiting until symptoms improve.  Preventing Future Headaches:  1) Pay attention to those factors that seem to trigger your headache. Try to avoid them when you can. If you have frequent headaches, it is useful to keep a diary of what you  were doing, feeling or eating in the hours before each attack. Show this to your doctor to help find the cause of your headaches.  a) If you feel that stress is a factor in your headaches, look at the sources of stress in your life. Find ways to release the build-up of those stresses by using regular exercise, relaxation methods (yoga, meditation), bio-feedback or simply taking time-out for yourself. For more information about this, consult your doctor or go to a local bookstore and review books and tapes on this subject.  b) Tyramine is a substance present in the following foods : chocolate, yogurt, all cheeses except cottage cheese and cream cheese. smoked or pickled fish and meat (including herring, caviar, bologna, pepperoni, salami), liver, avocados, bananas, figs, raisins, and red wine. Be aware that these foods may trigger a migraine in some persons. Try taking these foods out of your diet for 1-2 months to see if this reduces headache frequency.  Treating Future Attacks:  1) At the first sign of a migraine headache, take a medicine to stop it if one has been prescribed for you. If not, take acetaminophen (Tylenol) or ibuprofen (Motrin, Advil) if you are able to take these. The sooner you take medicine, the better it will work.  2) You may also want to find a quiet, dark, comfortable place to sit or lie down. Let yourself relax or sleep.  3) An ice pack on the forehead or area of greatest pain may also help.   Follow Up  with your doctor if the headache is not better within the next 24 hours. If you have frequent headaches you should discuss a treatment plan with your primary care doctor. Ask if you can have medicine to take at home the next time you get a bad headache. Poorly controlled chronic headaches may require a referral to a neurologist (headache specialist).  Get Prompt Medical Attention  if any of the following occur:    Your head pain gets worse, or does not improve within 24 hours    Repeated  vomiting (can t keep liquids down)    Sinus or ear or throat pain (not already reported)    Fever of 101  F (38.3  C) or higher, or as directed by your healthcare provider    Stiff neck    Extreme drowsiness, confusion or fainting    Weakness of an arm or leg or one side of the face    Difficulty with speech or vision    5036-3231 [employer]. [address] [place] [address]. All rights reserved. This information is not intended as a substitute for professional medical care. Always follow your healthcare professional's instructions.             Review of your medicines      Our records show that you are taking the medicines listed below. If these are incorrect, please call your family doctor or clinic.        Dose / Directions Last dose taken    acetaminophen-codeine 300-30 MG per tablet   Commonly known as:  TYLENOL WITH CODEINE #3   Quantity:  60 tablet        1-2 tabs up to twice daily as needed.   Refills:  0        clonazePAM 1 MG tablet   Commonly known as:  klonoPIN   Dose:  1 mg        Take 1 mg by mouth   Refills:  0        ibuprofen 800 MG tablet   Commonly known as:  ADVIL/MOTRIN   Quantity:  60 tablet        TAKE 1 TABLET EVERY 8 HOURS AS NEEDED FOR MODERATE PAIN   Refills:  5        IMITREX PO   Dose:  100 mg        Take 100 mg by mouth at onset of headache for migraine May repeat once   Refills:  0        MULTIVITAMIN ADULT PO        Take by mouth daily   Refills:  0                Orders Needing Specimen Collection     None      Pending Results     No orders found from 9/22/2017 to 9/25/2017.            Pending Culture Results     No orders found from 9/22/2017 to 9/25/2017.            Thank you for choosing [facility]       Thank you for choosing [facility] for your care. Our goal is always to provide you with excellent care. Hearing back from our patients is one way we can continue to improve our services. Please take a few minutes to complete the written survey that you may  "receive in the mail after you visit with us. Thank you!        [employer] Information     [employer] lets you send messages to your doctor, view your test results, renew your prescriptions, schedule appointments and more. To sign up, go to [email or web address] . Click on \"Log in\" on the left side of the screen, which will take you to the Welcome page. Then click on \"Sign up Now\" on the right side of the page.     You will be asked to enter the access code listed below, as well as some personal information. Please follow the directions to create your username and password.     Your access code is: [email or web address]  Expires: 10/13/2017  2:22 AM     Your access code will  in 90 days. If you need help or a new code, please call your [facility] clinic or [phone].        Care EveryWhere ID     This is your Care EveryWhere ID. This could be used by other organizations to access your [facility] medical records  [id number]        Equal Access to Services     SERA TREVINO : Hadcecilia wardo Somanoj, waaxda luqadaha, qaybta kaalmada adetheron, erik duenas . So [facility] [phone].    ATENCIÓN: Si habla español, tiene a baltazar disposición servicios gratuitos de asistencia lingüística. Llame al [phone].    We comply with applicable federal civil rights laws and [place] laws. We do not discriminate on the basis of race, color, national origin, age, disability sex, sexual orientation or gender identity.            After Visit Summary       This is your record. Keep this with you and show to your community pharmacist(s) and doctor(s) at your next visit.                  "

## 2017-09-24 NOTE — DISCHARGE INSTRUCTIONS
* Migraine Headache  Migraine headaches are related to changes in blood flow to the brain. This causes throbbing or constant pain on one or both sides of the head. The pain may last from a few hours to several days. There is usually nausea, vomiting, sensitivity to light and sound, and blurred vision. A migraine attack may be triggered by emotional stress, hormone changes during the menstrual cycle, oral contraceptives, alcohol use, certain foods containing tyramine, eye strain, weather changes, missing meals, or too little or too much sleep.  Home Care For This Headache:  1) If you were given pain medicine for this headache, do not drive yourself home . Arrange for a ride, instead. When you get home, try to sleep. You should feel much better when you wake up.  2) Migraine headaches may improve with an ice pack on the forehead or at the base of the skull. Heat to the back of your neck may relieve any neck spasm.  3) Drink only clear liquids or eat a very light diet to avoid nausea/vomiting until symptoms improve.  Preventing Future Headaches:  1) Pay attention to those factors that seem to trigger your headache. Try to avoid them when you can. If you have frequent headaches, it is useful to keep a diary of what you were doing, feeling or eating in the hours before each attack. Show this to your doctor to help find the cause of your headaches.  a) If you feel that stress is a factor in your headaches, look at the sources of stress in your life. Find ways to release the build-up of those stresses by using regular exercise, relaxation methods (yoga, meditation), bio-feedback or simply taking time-out for yourself. For more information about this, consult your doctor or go to a local bookstore and review books and tapes on this subject.  b) Tyramine is a substance present in the following foods : chocolate, yogurt, all cheeses except cottage cheese and cream cheese. smoked or pickled fish and meat (including herring,  caviar, bologna, pepperoni, salami), liver, avocados, bananas, figs, raisins, and red wine. Be aware that these foods may trigger a migraine in some persons. Try taking these foods out of your diet for 1-2 months to see if this reduces headache frequency.  Treating Future Attacks:  1) At the first sign of a migraine headache, take a medicine to stop it if one has been prescribed for you. If not, take acetaminophen (Tylenol) or ibuprofen (Motrin, Advil) if you are able to take these. The sooner you take medicine, the better it will work.  2) You may also want to find a quiet, dark, comfortable place to sit or lie down. Let yourself relax or sleep.  3) An ice pack on the forehead or area of greatest pain may also help.   Follow Up  with your doctor if the headache is not better within the next 24 hours. If you have frequent headaches you should discuss a treatment plan with your primary care doctor. Ask if you can have medicine to take at home the next time you get a bad headache. Poorly controlled chronic headaches may require a referral to a neurologist (headache specialist).  Get Prompt Medical Attention  if any of the following occur:    Your head pain gets worse, or does not improve within 24 hours    Repeated vomiting (can t keep liquids down)    Sinus or ear or throat pain (not already reported)    Fever of 101  F (38.3  C) or higher, or as directed by your healthcare provider    Stiff neck    Extreme drowsiness, confusion or fainting    Weakness of an arm or leg or one side of the face    Difficulty with speech or vision    2621-5525 [employer]. [address] [place] [address], [place] [address]. All rights reserved. This information is not intended as a substitute for professional medical care. Always follow your healthcare professional's instructions.

## 2017-10-07 ENCOUNTER — HOSPITAL ENCOUNTER (EMERGENCY)
Facility: HOSPITAL | Age: 42
Discharge: LEFT BEFORE DISCHARGE | End: 2017-10-07
Admitting: NURSE PRACTITIONER
Payer: COMMERCIAL

## 2017-10-07 VITALS
RESPIRATION RATE: 16 BRPM | SYSTOLIC BLOOD PRESSURE: 109 MMHG | DIASTOLIC BLOOD PRESSURE: 69 MMHG | OXYGEN SATURATION: 99 % | TEMPERATURE: 99.4 F | HEART RATE: 87 BPM

## 2017-10-07 PROCEDURE — 40000268 ZZH STATISTIC NO CHARGES

## 2017-10-07 NOTE — ED NOTES
Pt ambulated independently into room. Pt states she has a migraine that started yesterday evening. Pt rates pain 10/10. Pt took Midol without relief, tylenol #3 2 tablets at 1100, ibuprofen 800 mg PO at 0500. Pt requesting an Imitrex injection.

## 2017-10-07 NOTE — ED NOTES
Pt left without the advice of a provider and has refused an offer to talk with a provider before left. Pt signed leaving without medical advice form, HAYDEN Mendoza NP aware.

## 2017-10-08 ENCOUNTER — HOSPITAL ENCOUNTER (EMERGENCY)
Facility: HOSPITAL | Age: 42
Discharge: HOME | End: 2017-10-08
Attending: NURSE PRACTITIONER | Admitting: NURSE PRACTITIONER
Payer: COMMERCIAL

## 2017-10-08 VITALS
SYSTOLIC BLOOD PRESSURE: 99 MMHG | DIASTOLIC BLOOD PRESSURE: 62 MMHG | RESPIRATION RATE: 16 BRPM | OXYGEN SATURATION: 99 % | HEART RATE: 81 BPM | TEMPERATURE: 98.9 F

## 2017-10-08 DIAGNOSIS — G43.809 OTHER MIGRAINE WITHOUT STATUS MIGRAINOSUS, NOT INTRACTABLE: ICD-10-CM

## 2017-10-08 PROCEDURE — 25000128 H RX IP 250 OP 636: Performed by: NURSE PRACTITIONER

## 2017-10-08 PROCEDURE — 99214 OFFICE O/P EST MOD 30 MIN: CPT | Mod: 25

## 2017-10-08 PROCEDURE — 99213 OFFICE O/P EST LOW 20 MIN: CPT | Performed by: NURSE PRACTITIONER

## 2017-10-08 PROCEDURE — 96372 THER/PROPH/DIAG INJ SC/IM: CPT

## 2017-10-08 RX ORDER — SUMATRIPTAN 6 MG/.5ML
6 INJECTION, SOLUTION SUBCUTANEOUS ONCE
Status: COMPLETED | OUTPATIENT
Start: 2017-10-08 | End: 2017-10-08

## 2017-10-08 RX ADMIN — SUMATRIPTAN 6 MG: 6 INJECTION, SOLUTION SUBCUTANEOUS at 10:32

## 2017-10-08 NOTE — DISCHARGE INSTRUCTIONS
* Migraine Headache  Migraine headaches are related to changes in blood flow to the brain. This causes throbbing or constant pain on one or both sides of the head. The pain may last from a few hours to several days. There is usually nausea, vomiting, sensitivity to light and sound, and blurred vision. A migraine attack may be triggered by emotional stress, hormone changes during the menstrual cycle, oral contraceptives, alcohol use, certain foods containing tyramine, eye strain, weather changes, missing meals, or too little or too much sleep.  Home Care For This Headache:  1) If you were given pain medicine for this headache, do not drive yourself home . Arrange for a ride, instead. When you get home, try to sleep. You should feel much better when you wake up.  2) Migraine headaches may improve with an ice pack on the forehead or at the base of the skull. Heat to the back of your neck may relieve any neck spasm.  3) Drink only clear liquids or eat a very light diet to avoid nausea/vomiting until symptoms improve.  Preventing Future Headaches:  1) Pay attention to those factors that seem to trigger your headache. Try to avoid them when you can. If you have frequent headaches, it is useful to keep a diary of what you were doing, feeling or eating in the hours before each attack. Show this to your doctor to help find the cause of your headaches.  a) If you feel that stress is a factor in your headaches, look at the sources of stress in your life. Find ways to release the build-up of those stresses by using regular exercise, relaxation methods (yoga, meditation), bio-feedback or simply taking time-out for yourself. For more information about this, consult your doctor or go to a local bookstore and review books and tapes on this subject.  b) Tyramine is a substance present in the following foods : chocolate, yogurt, all cheeses except cottage cheese and cream cheese. smoked or pickled fish and meat (including herring,  caviar, bologna, pepperoni, salami), liver, avocados, bananas, figs, raisins, and red wine. Be aware that these foods may trigger a migraine in some persons. Try taking these foods out of your diet for 1-2 months to see if this reduces headache frequency.  Treating Future Attacks:  1) At the first sign of a migraine headache, take a medicine to stop it if one has been prescribed for you. If not, take acetaminophen (Tylenol) or ibuprofen (Motrin, Advil) if you are able to take these. The sooner you take medicine, the better it will work.  2) You may also want to find a quiet, dark, comfortable place to sit or lie down. Let yourself relax or sleep.  3) An ice pack on the forehead or area of greatest pain may also help.   Follow Up  with your doctor if the headache is not better within the next 24 hours. If you have frequent headaches you should discuss a treatment plan with your primary care doctor. Ask if you can have medicine to take at home the next time you get a bad headache. Poorly controlled chronic headaches may require a referral to a neurologist (headache specialist).  Get Prompt Medical Attention  if any of the following occur:    Your head pain gets worse, or does not improve within 24 hours    Repeated vomiting (can t keep liquids down)    Sinus or ear or throat pain (not already reported)    Fever of 101  F (38.3  C) or higher, or as directed by your healthcare provider    Stiff neck    Extreme drowsiness, confusion or fainting    Weakness of an arm or leg or one side of the face    Difficulty with speech or vision    2437-1281 [employer]. [address] [place] [address], [place] [address]. All rights reserved. This information is not intended as a substitute for professional medical care. Always follow your healthcare professional's instructions.

## 2017-10-08 NOTE — ED AVS SNAPSHOT
[facility]    [address] [address]    Phone:  [phone]                                       Venu Amaya   MRN: [id number]    Department:  [facility]   Date of Visit:  10/8/2017           Patient Information     Date Of Birth          1975        Your diagnoses for this visit were:     Other migraine without status migrainosus, not intractable        You were seen by Jennifer Meyers NP.      Follow-up Information     Follow up with Robin Segovia MD.    Specialty:  Family Practice    Why:  As needed, If symptoms worsen    Contact information:    [facility]  [address] [facility], [place] [address] [phone]          Follow up with [facility].    Specialty:  EMERGENCY MEDICINE    Why:  As needed, If symptoms worsen    Contact information:    [address] [address] [phone]    Additional information:    From [place]: Take [address]. Turn left at [address]. Turn left at the first stoplight on [address]. At the first stop sign, take a right onto [address]. Take a left into the parking lot and continue through until you reach the North enterance of the building.       From [place]: Take [address]. Take the [address] ramp towards [place]. Turn left onto [address]. Take a slight right onto [address]. Turn left at the first stoplight on [address]. At the first stop sign, take a right onto [address]. Take a left into the parking lot and continue through until you reach the North enterance of the building.       From [place]: Take [address]. Take a right at [address]. At the first stop sign, take a right onto [address]. Take a left into the parking lot and continue through until you reach the North enterance of the building.         Discharge Instructions         * Migraine Headache  Migraine headaches are related  to changes in blood flow to the brain. This causes throbbing or constant pain on one or both sides of the head. The pain may last from a few hours to several days. There is usually nausea, vomiting, sensitivity to light and sound, and blurred vision. A migraine attack may be triggered by emotional stress, hormone changes during the menstrual cycle, oral contraceptives, alcohol use, certain foods containing tyramine, eye strain, weather changes, missing meals, or too little or too much sleep.  Home Care For This Headache:  1) If you were given pain medicine for this headache, do not drive yourself home . Arrange for a ride, instead. When you get home, try to sleep. You should feel much better when you wake up.  2) Migraine headaches may improve with an ice pack on the forehead or at the base of the skull. Heat to the back of your neck may relieve any neck spasm.  3) Drink only clear liquids or eat a very light diet to avoid nausea/vomiting until symptoms improve.  Preventing Future Headaches:  1) Pay attention to those factors that seem to trigger your headache. Try to avoid them when you can. If you have frequent headaches, it is useful to keep a diary of what you were doing, feeling or eating in the hours before each attack. Show this to your doctor to help find the cause of your headaches.  a) If you feel that stress is a factor in your headaches, look at the sources of stress in your life. Find ways to release the build-up of those stresses by using regular exercise, relaxation methods (yoga, meditation), bio-feedback or simply taking time-out for yourself. For more information about this, consult your doctor or go to a local bookstore and review books and tapes on this subject.  b) Tyramine is a substance present in the following foods : chocolate, yogurt, all cheeses except cottage cheese and cream cheese. smoked or pickled fish and meat (including herring, caviar, bologna, pepperoni, salami), liver, avocados,  bananas, figs, raisins, and red wine. Be aware that these foods may trigger a migraine in some persons. Try taking these foods out of your diet for 1-2 months to see if this reduces headache frequency.  Treating Future Attacks:  1) At the first sign of a migraine headache, take a medicine to stop it if one has been prescribed for you. If not, take acetaminophen (Tylenol) or ibuprofen (Motrin, Advil) if you are able to take these. The sooner you take medicine, the better it will work.  2) You may also want to find a quiet, dark, comfortable place to sit or lie down. Let yourself relax or sleep.  3) An ice pack on the forehead or area of greatest pain may also help.   Follow Up  with your doctor if the headache is not better within the next 24 hours. If you have frequent headaches you should discuss a treatment plan with your primary care doctor. Ask if you can have medicine to take at home the next time you get a bad headache. Poorly controlled chronic headaches may require a referral to a neurologist (headache specialist).  Get Prompt Medical Attention  if any of the following occur:    Your head pain gets worse, or does not improve within 24 hours    Repeated vomiting (can t keep liquids down)    Sinus or ear or throat pain (not already reported)    Fever of 101  F (38.3  C) or higher, or as directed by your healthcare provider    Stiff neck    Extreme drowsiness, confusion or fainting    Weakness of an arm or leg or one side of the face    Difficulty with speech or vision    6175-4487 [employer]. [address] [place] [address], [place] [address]. All rights reserved. This information is not intended as a substitute for professional medical care. Always follow your healthcare professional's instructions.             Review of your medicines      Our records show that you are taking the medicines listed below. If these are incorrect, please call your family doctor or clinic.        Dose / Directions Last dose  "taken    acetaminophen-codeine 300-30 MG per tablet   Commonly known as:  TYLENOL WITH CODEINE #3   Quantity:  60 tablet        1-2 tabs up to twice daily as needed.   Refills:  0        clonazePAM 1 MG tablet   Commonly known as:  klonoPIN   Dose:  1 mg        Take 1 mg by mouth   Refills:  0        ibuprofen 800 MG tablet   Commonly known as:  ADVIL/MOTRIN   Quantity:  60 tablet        TAKE 1 TABLET EVERY 8 HOURS AS NEEDED FOR MODERATE PAIN   Refills:  5        IMITREX PO   Dose:  100 mg        Take 100 mg by mouth at onset of headache for migraine May repeat once   Refills:  0        MULTIVITAMIN ADULT PO        Take by mouth daily   Refills:  0                Orders Needing Specimen Collection     None      Pending Results     No orders found from 10/6/2017 to 10/9/2017.            Pending Culture Results     No orders found from 10/6/2017 to 10/9/2017.            Thank you for choosing [facility]       Thank you for choosing [facility] for your care. Our goal is always to provide you with excellent care. Hearing back from our patients is one way we can continue to improve our services. Please take a few minutes to complete the written survey that you may receive in the mail after you visit with us. Thank you!        [employer] Information     [employer] lets you send messages to your doctor, view your test results, renew your prescriptions, schedule appointments and more. To sign up, go to [email or web address] . Click on \"Log in\" on the left side of the screen, which will take you to the Welcome page. Then click on \"Sign up Now\" on the right side of the page.     You will be asked to enter the access code listed below, as well as some personal information. Please follow the directions to create your username and password.     Your access code is: [email or web address]  Expires: 10/13/2017  2:22 AM     Your access code will  in 90 days. If you need help or a new code, please call your [facility] clinic or [phone].      "   Care EveryWhere ID     This is your Care EveryWhere ID. This could be used by other organizations to access your [facility] medical records  [id number]        Equal Access to Services     KING TREVINO : Buddy Magana, lukas ramsey, caroline ashton, erik garnica. So [facility] [phone].    ATENCIÓN: Si habla español, tiene a baltazar disposición servicios gratuitos de asistencia lingüística. Llame al [phone].    We comply with applicable federal civil rights laws and [place] laws. We do not discriminate on the basis of race, color, national origin, age, disability, sex, sexual orientation, or gender identity.            After Visit Summary       This is your record. Keep this with you and show to your community pharmacist(s) and doctor(s) at your next visit.

## 2017-10-08 NOTE — ED AVS SNAPSHOT
[facility]    [address] [address]    Phone:  [phone]                                       Venu Amaya   MRN: [id number]    Department[facility]  [facility]   Date of Visit:  10/8/2017           After Visit Summary Signature Page     I have received my discharge instructions, and my questions have been answered. I have discussed any challenges I see with this plan with the nurse or doctor.    ..........................................................................................................................................  Patient/Patient Representative Signature      ..........................................................................................................................................  Patient Representative Print Name and Relationship to Patient    ..................................................               ................................................  Date                                            Time    ..........................................................................................................................................  Reviewed by Signature/Title    ...................................................              ..............................................  Date                                                            Time

## 2017-10-08 NOTE — ED PROVIDER NOTES
History     Chief Complaint   Patient presents with     Headache     request immitrex injection. was here yest and left because of the long wait     The history is provided by the patient. No  was used.     Venu Amaya is a 42 year old female who has a migraine. She has had it for 3 days.  It is no differne tfrom her usual migraines.  She has used all of her home imitrex and cannot get more yet.  She comes today requesting an injection.  She has generalized HA and photophobia.     I have reviewed the Medications, Allergies, Past Medical and Surgical History, and Social History in the Epic system.        Review of Systems   Constitutional: Negative for fever.   HENT: Negative for tinnitus.         Generalized HA   Eyes: Positive for photophobia. Negative for visual disturbance.   Respiratory: Negative.    Cardiovascular: Negative.    Gastrointestinal: Positive for nausea.   Genitourinary: Negative.  Negative for urgency.   Musculoskeletal: Negative.    Neurological: Positive for headaches. Negative for weakness and numbness.   Hematological: Negative for adenopathy.       Physical Exam   BP: 99/62  Pulse: 81  Temp: 98.9  F (37.2  C)  Resp: 16  SpO2: 99 %  Physical Exam   Constitutional: She is oriented to person, place, and time. She appears well-developed and well-nourished.   HENT:   Head: Normocephalic and atraumatic.   Eyes: Conjunctivae are normal. Right eye exhibits no discharge. Left eye exhibits no discharge.   Neck: Normal range of motion. Neck supple.   Cardiovascular: Normal rate.    Pulmonary/Chest: Effort normal.   Musculoskeletal: Normal range of motion.   Neurological: She is alert and oriented to person, place, and time. She has normal reflexes. She exhibits normal muscle tone. Coordination normal.   Skin: Skin is warm and dry. She is not diaphoretic.   Nursing note and vitals reviewed.      ED Course     ED Course     Procedures             Labs Ordered and Resulted from  Time of ED Arrival Up to the Time of Departure from the ED - No data to display    Assessments & Plan (with Medical Decision Making)     I have reviewed the nursing notes.    Pathophysiology, possible etiology and treatment with potential outcomes, risks, benefits, and alternatives discussed to the best of my ability      Medications   SUMAtriptan (IMITREX) injection 6 mg (6 mg Subcutaneous Given 10/8/17 1032)     Good   relief from the imitrex and without SE's  Discuss prophylaxis with PCP.  F/U here or PCP as needed  I have reviewed the findings, diagnosis, plan and [facility]        Discharge Medication List as of 10/8/2017 10:54 AM          Final diagnoses:   Other migraine without status migrainosus, not intractable     Pt verbalizes understanding and agreement with plan.  Follow up for worsening symptoms    10/8/2017   [facility]   [facility]Y [facility]     Jennifer Meyers, XOCHITL  10/11/17 1121

## 2017-10-08 NOTE — LETTER
[facility]  [address]  [place] [address]  Phone: [phone]    October 8, 2017        Venu Amaya  [address]  [facility] [address]          To whom it may concern:    RE: Venu Amaya    Patient was seen and treated today at our clinic and will miss school 10/9/2017.  She has strep throat    Please contact me for questions or concerns.      Sincerely,      Jennifer Meyers CNP

## 2017-10-08 NOTE — ED NOTES
C/o of migraine for last few days, states was here yesterday but it was busy and had to leave, requests imitrex injection, states she sees spots

## 2017-10-09 ENCOUNTER — HOSPITAL ENCOUNTER (EMERGENCY)
Facility: HOSPITAL | Age: 42
Discharge: HOME | End: 2017-10-09
Attending: PHYSICIAN ASSISTANT | Admitting: PHYSICIAN ASSISTANT
Payer: COMMERCIAL

## 2017-10-09 VITALS
DIASTOLIC BLOOD PRESSURE: 76 MMHG | TEMPERATURE: 97.9 F | RESPIRATION RATE: 16 BRPM | SYSTOLIC BLOOD PRESSURE: 129 MMHG | OXYGEN SATURATION: 100 %

## 2017-10-09 DIAGNOSIS — G43.719 INTRACTABLE CHRONIC MIGRAINE WITHOUT AURA AND WITHOUT STATUS MIGRAINOSUS: ICD-10-CM

## 2017-10-09 PROCEDURE — 99214 OFFICE O/P EST MOD 30 MIN: CPT | Mod: 25

## 2017-10-09 PROCEDURE — 96372 THER/PROPH/DIAG INJ SC/IM: CPT

## 2017-10-09 PROCEDURE — 25000128 H RX IP 250 OP 636: Performed by: PHYSICIAN ASSISTANT

## 2017-10-09 PROCEDURE — 99213 OFFICE O/P EST LOW 20 MIN: CPT | Performed by: PHYSICIAN ASSISTANT

## 2017-10-09 RX ORDER — SUMATRIPTAN 6 MG/.5ML
6 INJECTION, SOLUTION SUBCUTANEOUS ONCE
Status: COMPLETED | OUTPATIENT
Start: 2017-10-09 | End: 2017-10-09

## 2017-10-09 RX ADMIN — SUMATRIPTAN 6 MG: 6 INJECTION, SOLUTION SUBCUTANEOUS at 13:08

## 2017-10-09 ASSESSMENT — ENCOUNTER SYMPTOMS
VOMITING: 0
NAUSEA: 1
DIZZINESS: 0
CONFUSION: 0
AGITATION: 0
CONSTITUTIONAL NEGATIVE: 1
PHOTOPHOBIA: 1
NECK STIFFNESS: 0
LIGHT-HEADEDNESS: 0
NECK PAIN: 0
CARDIOVASCULAR NEGATIVE: 1
HEADACHES: 1

## 2017-10-09 NOTE — ED AVS SNAPSHOT
[facility]    [address] [address]    Phone:  [phone]                                       Venu Amaya   MRN: [id number]    Department[facility]  [facility]   Date of Visit:  10/9/2017           After Visit Summary Signature Page     I have received my discharge instructions, and my questions have been answered. I have discussed any challenges I see with this plan with the nurse or doctor.    ..........................................................................................................................................  Patient/Patient Representative Signature      ..........................................................................................................................................  Patient Representative Print Name and Relationship to Patient    ..................................................               ................................................  Date                                            Time    ..........................................................................................................................................  Reviewed by Signature/Title    ...................................................              ..............................................  Date                                                            Time

## 2017-10-09 NOTE — ED PROVIDER NOTES
"  History     Chief Complaint   Patient presents with     Headache     c/o migraine h/a and notes \"I just need a shot\"     The history is provided by the patient. No  was used.     Venu Amaya is a 42 year old female who has frontal, sharp headache. States it is due to her menstruation.  States these headaches are different from the ones she gets due to her neck pain.  Had some nausea so she took some zofran.  Was seen here yesterday for same complaint and states she was given an injection of Imitrex. Pt has been seen in ED/UC 7 times in last 3 mos for same complaint of headache/migraine.  Light bothers her eyes. No change in vision.    I have reviewed the Medications, Allergies, Past Medical and Surgical History, and Social History in the Epic system.    Allergies:   Allergies   Allergen Reactions     Amoxicillin Swelling     Hands swell      Levofloxacin Other (See Comments)     Unknown reaction - Levaquin      No Clinical Screening - See Comments      utapar drop in blood pressure     Penicillins      Ritodrine          No current facility-administered medications on file prior to encounter.   Current Outpatient Prescriptions on File Prior to Encounter:  SUMAtriptan Succinate (IMITREX PO) Take 100 mg by mouth at onset of headache for migraine May repeat once   clonazePAM (KLONOPIN) 1 MG tablet Take 1 mg by mouth   Multiple Vitamins-Minerals (MULTIVITAMIN ADULT PO) Take by mouth daily   ibuprofen (ADVIL,MOTRIN) 800 MG tablet TAKE 1 TABLET EVERY 8 HOURS AS NEEDED FOR MODERATE PAIN   acetaminophen-codeine (TYLENOL/CODEINE #3) 300-30 MG per tablet 1-2 tabs up to twice daily as needed.   [DISCONTINUED] PRENATAL VITAMINS PO Take 1 tablet by mouth daily.       Patient Active Problem List   Diagnosis     Abnormal pap     Anxiety state     Depressive disorder, not elsewhere classified     Migraine     Ankylosing spondylitis (H)     Cervicalgia     Dysthymic disorder     Myalgia and myositis     " "Chronic low back pain     Opioid dependence (H)     Headache     Long term current use of opiate analgesic       Past Surgical History:   Procedure Laterality Date     inguinal hernia repair, Germ[place] repair  10/14/2008    RT      TUBAL LIGATION         Social History   Substance Use Topics     Smoking status: Former Smoker     Packs/day: 0.00     Types: Cigarettes     Quit date: 7/13/2015     Smokeless tobacco: Never Used      Comment: Passive Exposure: Yes      Alcohol use No       Most Recent Immunizations   Administered Date(s) Administered     DTAP (<7y) 06/23/1982     MMR 07/20/1982     Pneumococcal 23 valent 03/21/2005     Poliovirus, inactivated (IPV) 08/29/1983     TD (ADULT, 7+) 08/29/1983       BMI: Estimated body mass index is 19.49 kg/(m^2) as calculated from the following:    Height as of 8/14/17: 1.6 m (5' 3\").    Weight as of 8/16/17: 49.9 kg (110 lb).      Review of Systems   Constitutional: Negative.    Eyes: Positive for photophobia. Negative for visual disturbance.   Cardiovascular: Negative.    Gastrointestinal: Positive for nausea. Negative for vomiting.   Musculoskeletal: Negative for neck pain and neck stiffness.   Neurological: Positive for headaches. Negative for dizziness and light-headedness.   Psychiatric/Behavioral: Negative for agitation, behavioral problems and confusion.       Physical Exam   BP: 129/76  Heart Rate: 80  Temp: 97.9  F (36.6  C)  Resp: 16  SpO2: 100 %       Physical Exam   Constitutional: She is oriented to person, place, and time. She appears well-developed and well-nourished. No distress.   HENT:   Head: Normocephalic and atraumatic.   Eyes: Conjunctivae and EOM are normal. Pupils are equal, round, and reactive to light. Right eye exhibits no discharge. Left eye exhibits no discharge.   Neck: Normal range of motion. Neck supple.   Cardiovascular: Normal rate.    Pulmonary/Chest: Effort normal.   Neurological: She is alert and oriented to person, place, and time. " No cranial nerve deficit. Coordination normal.   Skin: She is not diaphoretic.   Psychiatric: She has a normal mood and affect. Her speech is normal and behavior is normal. Cognition and memory are normal.   Nursing note and vitals reviewed.      ED Course     ED Course     Procedures          Medications   SUMAtriptan (IMITREX) injection 6 mg (6 mg Subcutaneous Given 10/9/17 130)   pt observed x 20 minutes. Pt tolerated well    Assessments & Plan (with Medical Decision Making)     I have reviewed the nursing notes.    I have reviewed the findings, diagnosis, plan and need for follow up with the patient.      Final diagnoses:   Intractable chronic migraine without aura and without status migrainosus           Patient verbally educated and given appropriate education sheets for the diagnoses and has no questions.  Take medications as directed.   Follow up with your Primary Care provider on 12 October 2017 at 1415 for reevaluation of these headaches.  if further concerns develop, return to the ER  Wendy Harkins Certified  Physician Assistant  10/9/2017  2:28 PM  URGENT CARE CLINIC      10/9/2017   [facility]     Wendy Harkins PA  10/09/17 7635

## 2017-10-09 NOTE — ED NOTES
Patient presents with headache X 3 days.  Patient received shot yesterday in UC.  Patient states she wants a shot of Imitrex.

## 2017-10-09 NOTE — ED AVS SNAPSHOT
[facility]    [address] [address]    Phone:  [phone]                                       Venu Amaya   MRN: [id number]    Department:  [facility]   Date of Visit:  10/9/2017           Patient Information     Date Of Birth          1975        Your diagnoses for this visit were:     Intractable chronic migraine without aura and without status migrainosus        You were seen by Wendy Harkins PA.      Follow-up Information     Follow up with Robin Segovia MD.    Specialty:  Family Practice    Why:  On 12 October 2017 at 2:15 for reevaluation of your Headaches    Contact information:    [facility]  [address] [facility] [address] [phone]          Follow up with [facility].    Specialty:  EMERGENCY MEDICINE    Why:  If further concerns develop    Contact information:    [address] [address] [phone]    Additional information:    From [place]: Take [address]. Turn left at [address]. Turn left at the first stoplight on [address]. At the first stop sign, take a right onto [address]. Take a left into the parking lot and continue through until you reach the North enterance of the building.       From [place]: Take [address]. Take the [address] ramp towards [place]. Turn left onto [address]. Take a slight right onto [address]. Turn left at the first stoplight on [address]. At the first stop sign, take a right onto [address]. Take a left into the parking lot and continue through until you reach the North enterance of the building.       From [place]: Take [address]. Take a right at [address]. At the first stop sign, take a right onto [address]. Take a left into the parking lot and continue through until you reach the North enterance of the building.       Discharge References/Attachments      "HEADACHE, MIGRAINE (CLASSICAL) (ENGLISH)    HEADACHE, MIGRAINE: STAGES AND TREATMENT (ENGLISH)    MIGRAINE HEADACHES, PREVENTING, TRIGGERS (ENGLISH)    MIGRAINE HEADACHES, PREVENTING: MEDICINES AND LIFESTYLE CHANGES (ENGLISH)         Review of your medicines      Our records show that you are taking the medicines listed below. If these are incorrect, please call your family doctor or clinic.        Dose / Directions Last dose taken    acetaminophen-codeine 300-30 MG per tablet   Commonly known as:  TYLENOL WITH CODEINE #3   Quantity:  60 tablet        1-2 tabs up to twice daily as needed.   Refills:  0        clonazePAM 1 MG tablet   Commonly known as:  klonoPIN   Dose:  1 mg        Take 1 mg by mouth   Refills:  0        ibuprofen 800 MG tablet   Commonly known as:  ADVIL/MOTRIN   Quantity:  60 tablet        TAKE 1 TABLET EVERY 8 HOURS AS NEEDED FOR MODERATE PAIN   Refills:  5        IMITREX PO   Dose:  100 mg        Take 100 mg by mouth at onset of headache for migraine May repeat once   Refills:  0        MULTIVITAMIN ADULT PO        Take by mouth daily   Refills:  0                Orders Needing Specimen Collection     None      Pending Results     No orders found from 10/7/2017 to 10/10/2017.            Pending Culture Results     No orders found from 10/7/2017 to 10/10/2017.            Thank you for choosing [facility]       Thank you for choosing [facility] for your care. Our goal is always to provide you with excellent care. Hearing back from our patients is one way we can continue to improve our services. Please take a few minutes to complete the written survey that you may receive in the mail after you visit with us. Thank you!        "[employer]hart Information     [employer] lets you send messages to your doctor, view your test results, renew your prescriptions, schedule appointments and more. To sign up, go to [email or web address]/"[employer]hart . Click on \"Log in\" on the left side of the screen, which will take you to the " "Welcome page. Then click on \"Sign up Now\" on the right side of the page.     You will be asked to enter the access code listed below, as well as some personal information. Please follow the directions to create your username and password.     Your access code is: [email or web address]  Expires: 10/13/2017  2:22 AM     Your access code will  in 90 days. If you need help or a new code, please call your [facility] clinic or [phone].        Care EveryWhere ID     This is your Care EveryWhere ID. This could be used by other organizations to access your [facility] medical records  [id number]        Equal Access to Services     [facility]ina[facility]ZB[facility] : Buddy Magana, lukas ramsey, caroline ashton, erik garnica. So [facility] [phone].    ATENCIÓN: Si habla español, tiene a baltazar disposición servicios gratuitos de asistencia lingüística. Llame al [phone].    We comply with applicable federal civil rights laws and [place] laws. We do not discriminate on the basis of race, color, national origin, age, disability, sex, sexual orientation, or gender identity.            After Visit Summary       This is your record. Keep this with you and show to your community pharmacist(s) and doctor(s) at your next visit.                  "

## 2017-10-11 ASSESSMENT — ENCOUNTER SYMPTOMS
NUMBNESS: 0
WEAKNESS: 0
PHOTOPHOBIA: 1
ADENOPATHY: 0
HEADACHES: 1
CARDIOVASCULAR NEGATIVE: 1
NAUSEA: 1
RESPIRATORY NEGATIVE: 1
MUSCULOSKELETAL NEGATIVE: 1
FEVER: 0

## 2018-01-31 ENCOUNTER — TRANSFERRED RECORDS (OUTPATIENT)
Dept: HEALTH INFORMATION MANAGEMENT | Facility: HOSPITAL | Age: 43
End: 2018-01-31

## 2018-02-12 ENCOUNTER — HOSPITAL ENCOUNTER (EMERGENCY)
Facility: HOSPITAL | Age: 43
Discharge: HOME | End: 2018-02-12
Attending: NURSE PRACTITIONER | Admitting: NURSE PRACTITIONER
Payer: COMMERCIAL

## 2018-02-12 VITALS
TEMPERATURE: 98.7 F | DIASTOLIC BLOOD PRESSURE: 45 MMHG | OXYGEN SATURATION: 99 % | RESPIRATION RATE: 16 BRPM | SYSTOLIC BLOOD PRESSURE: 107 MMHG

## 2018-02-12 DIAGNOSIS — G44.89 HEADACHE SYNDROME: ICD-10-CM

## 2018-02-12 PROBLEM — M48.02 NEURAL FORAMINAL STENOSIS OF CERVICAL SPINE: Status: ACTIVE | Noted: 2017-10-05

## 2018-02-12 PROCEDURE — 25000128 H RX IP 250 OP 636: Performed by: NURSE PRACTITIONER

## 2018-02-12 PROCEDURE — 99214 OFFICE O/P EST MOD 30 MIN: CPT | Performed by: NURSE PRACTITIONER

## 2018-02-12 PROCEDURE — G0463 HOSPITAL OUTPT CLINIC VISIT: HCPCS | Mod: 25

## 2018-02-12 PROCEDURE — 96372 THER/PROPH/DIAG INJ SC/IM: CPT

## 2018-02-12 RX ORDER — SUMATRIPTAN 6 MG/.5ML
6 INJECTION, SOLUTION SUBCUTANEOUS ONCE
Status: COMPLETED | OUTPATIENT
Start: 2018-02-12 | End: 2018-02-12

## 2018-02-12 RX ADMIN — SUMATRIPTAN 6 MG: 6 INJECTION SUBCUTANEOUS at 15:35

## 2018-02-12 ASSESSMENT — ENCOUNTER SYMPTOMS
FATIGUE: 0
NAUSEA: 0
APPETITE CHANGE: 0
RESPIRATORY NEGATIVE: 1
VOMITING: 0
FEVER: 0
PHOTOPHOBIA: 0
LIGHT-HEADEDNESS: 0
HEADACHES: 1
DIZZINESS: 0
ABDOMINAL PAIN: 0
WEAKNESS: 0
ACTIVITY CHANGE: 1

## 2018-02-12 NOTE — DISCHARGE INSTRUCTIONS
"  Rest and push fluids at home.   Call for follow up visit to see PCP tomorrow if symptoms persist.          Self-Care for Headaches  Most headaches aren't serious and can be relieved with self-care. But some headaches may be a sign of another health problem like eye trouble or high blood pressure. To find the best treatment, learn what kind of headaches you get. For tension headaches, self-care will usually help. To treat migraines, ask your healthcare provider for advice. It is also possible to get both tension and migraine headaches. Self-care involves relieving the pain and avoiding headache  triggers  if you can.    Ways to reduce pain and tension  Try these steps:    Apply a cold compress or ice pack to the pain site.    Drink fluids. If nausea makes it hard to drink, try sucking on ice.    Rest. Protect yourself from bright light and loud noises.    Calm your emotions by imagining a peaceful scene.    Massage tight neck, shoulder, and head muscles.    To relax muscles, soak in a hot bath or use a hot shower.  Use medicines  Aspirin or aspirin substitutes, such as ibuprofen and acetaminophen, can relieve headache. Remember: Never give aspirin to anyone 18 years old or younger because of the risk of developing Reye syndrome. Use pain medicines only when necessary.  Track your headaches  Keeping a headache diary can help you and your healthcare provider identify what's causing your headaches:    Note when each headache happens.    Identify your activities and the foods you've eaten 6 to 8 hours before the headache began.    Look for any trends or \"triggers.\"  Signs of tension headache  Any of the following can be signs:    Dull pain or feeling of pressure in a tight band around your head    Pain in your neck or shoulders    Headache without a definite beginning or end    Headache after an activity such as driving or working on a computer  Signs of migraine  Any of the following can be signs:    Throbbing pain on " one or both sides of your head    Nausea or vomiting    Extreme sensitivity to light, sound, and smells    Bright spots, flashes, or other visual changes    Pain or nausea so severe that you can't continue your daily activities

## 2018-02-12 NOTE — ED NOTES
Pt c/o a headache since yesterday. Hx of migraines. Pt took tylenol 3 and Excedrin at home without relief. Pt ran out of her home Imitrex. No c/o n/v/d and it is not the worst headache she has ever had. Pt alert and oriented. Drove self to .

## 2018-02-12 NOTE — ED PROVIDER NOTES
History     Chief Complaint   Patient presents with     Headache     c/o migraine h/a, notes hx of h/a's     HPI  Venu Amaya is a 42 year old female who presents with a headache for 3 days, has pain bilaterally in her temples. No injury. Has taken ibuprofen, T3# and excedrin today without relief. Is out of her Imitrex and will sometimes need to come in and get the shot. Denies N/V or vision changes. Eating and drinking fine.         Problem List:    Patient Active Problem List    Diagnosis Date Noted     Neural foraminal stenosis of cervical spine 10/05/2017     Priority: Medium     Opioid dependence (H) 12/23/2015     Priority: Medium     Long term (current) use of opiate analgesic 12/23/2015     Priority: Medium     Chronic low back pain 11/02/2015     Priority: Medium     Overview:   IMO Update       Headache 10/23/2014     Priority: Medium     Overview:   10/23/14: According to neurology consultation of 9/2/14, previous treatments  include pretty much all of the triptans, Maxalt, Imitrex, Amerge, Axert, Frova, and Zomig. She reports that she has tried these when she did not have a constant headache and they did not work when she took them at first onset nor do they work now. She recently tried Imitrex injections and has tried a nasal spray in the past without improvement. The patient does take Compazine for the nausea and reports that that helps somewhat. She has tried numerous preventative medications as well including Topamax, Depakote, gabapentin, Tegretol, Dilantin, nortriptyline, propranolol, and verapamil per her report. She has tried physical therapy as well as trigger injections and denies that any of these things are helpful. She does report she takes ibuprofen 800 mg twice per day for ankylosing spondylitis. For her headaches, the only thing she has found that helped is Tylenol with Codeine.        Cervicalgia 05/15/2014     Priority: Medium     Myalgia and myositis 05/15/2014     Priority:  Medium     Abnormal pap 07/30/2013     Priority: Medium     Ankylosing spondylitis (H) 05/24/2012     Priority: Medium     Flori AN        Dysthymic disorder 09/13/2010     Priority: Medium     Anxiety state 06/02/2009     Priority: Medium     Problem list name updated by automated process. Provider to review       Depressive disorder, not elsewhere classified 10/31/2007     Priority: Medium     Migraine 01/15/2003     Priority: Medium     Problem list name updated by automated process. Provider to review          Past Medical History:    Past Medical History:   Diagnosis Date     Abnormal Pap smear and cervical HPV (human papillo 05/25/2011     Ankylosing spondylitis (H) 05/24/2012     Anxiety state, unspecified 06/02/2009     Cervicalgia pain (neck) 11/03/2011     Chronic Headache disorder  05/25/2011     Depressive disorder, not elsewhere classified 10/31/2007     Dysmenorrhea 04/03/2012     Habitual aborter, antepartum condition or complica 02/10/2005     Inguinal hernia without mention of obstruction or gangrene, unilateral or unspecified, (not specified as recurrent) 06/16/2008     Irregular menstrual cycle 05/25/2011     Mental disorders of mother, postpartum 10/06/2005     Migraine, unspecified, without mention of intractable migraine without mention of status migrainosus 01/15/2003     Moderate dysplasia of cervix 11/03/2011     Pain in thoracic spine 11/03/2011     Scoliosis (and kyphoscoliosis), idiopathic 05/16/2011       Past Surgical History:    Past Surgical History:   Procedure Laterality Date     inguinal hernia repair, Germain repair  10/14/2008    RT      TUBAL LIGATION         Family History:    Family History   Problem Relation Age of Onset     CANCER Mother      lymphoma     CANCER Other      CANCER Other      Colon Cancer Sister        Social History:  Marital Status:   [2]  Social History   Substance Use Topics     Smoking status: Former Smoker     Packs/day: 0.00      "Types: Cigarettes     Quit date: 7/13/2015     Smokeless tobacco: Never Used      Comment: Passive Exposure: Yes      Alcohol use No        Medications:      aspirin-acetaminophen-caffeine (EXCEDRIN MIGRAINE) 250-250-65 MG per tablet   SUMAtriptan Succinate (IMITREX PO)   clonazePAM (KLONOPIN) 1 MG tablet   Multiple Vitamins-Minerals (MULTIVITAMIN ADULT PO)   ibuprofen (ADVIL,MOTRIN) 800 MG tablet   acetaminophen-codeine (TYLENOL/CODEINE #3) 300-30 MG per tablet   [DISCONTINUED] PRENATAL VITAMINS PO         Review of Systems   Constitutional: Positive for activity change. Negative for appetite change, fatigue and fever.   HENT: Negative.    Eyes: Negative for photophobia and visual disturbance.   Respiratory: Negative.    Gastrointestinal: Negative for abdominal pain, nausea and vomiting.   Musculoskeletal:        No new symptoms, has chronic pain   Neurological: Positive for headaches (States it is her \"normal headache\"). Negative for dizziness, weakness and light-headedness.       Physical Exam   BP: (!) 107/45  Heart Rate: 68  Temp: 98.7  F (37.1  C)  Resp: 16  SpO2: 99 %      Physical Exam   Constitutional: She is oriented to person, place, and time. She appears well-developed and well-nourished. No distress.   HENT:   Head: Normocephalic and atraumatic.   Right Ear: Tympanic membrane and external ear normal.   Left Ear: Tympanic membrane and external ear normal.   Nose: Nose normal.   Mouth/Throat: Uvula is midline, oropharynx is clear and moist and mucous membranes are normal. No oropharyngeal exudate.   Eyes: Conjunctivae and lids are normal. No scleral icterus.   Neck: Normal range of motion. Neck supple. No rigidity. No edema, no erythema and normal range of motion present.   Cardiovascular: Normal rate, regular rhythm and normal heart sounds.    Pulmonary/Chest: Effort normal and breath sounds normal. No respiratory distress. She has no decreased breath sounds. She has no wheezes.   Musculoskeletal: Normal " range of motion.   Lymphadenopathy:     She has no cervical adenopathy.   Neurological: She is alert and oriented to person, place, and time. Gait normal. GCS eye subscore is 4. GCS verbal subscore is 5. GCS motor subscore is 6.   Skin: Skin is warm, dry and intact. She is not diaphoretic.   Psychiatric: Her affect is blunt. She is slowed. Cognition and memory are normal.   Was drinking iced coffee drink from [employer] upon entering the room and engaged in her phone. She quickly put the phone down, closed her eyes and began holding her head. Appeared a bit slowed, questionably overmedicated although she is on klonopin and Tylenol #3 daily.    Nursing note and vitals reviewed.      ED Course     ED Course     Procedures     Medications   SUMAtriptan (IMITREX) injection 6 mg (6 mg Subcutaneous Given 2/12/18 1038)     Symptoms much improved 15 minutes following injection.     Assessments & Plan (with Medical Decision Making)     I have reviewed the nursing notes.  I have reviewed the findings, diagnosis, plan and need for follow up with the patient.  Rest and push fluids.   See PCP tomorrow if not improving.   Return here if sx worsen.     Final diagnoses:   Headache syndrome       2/12/2018   [facility]     Christa Mendoza NP  02/12/18 1933

## 2018-02-12 NOTE — ED AVS SNAPSHOT
[facility]    [address] [address]    Phone:  [phone]                                       Venu Amaya   MRN: [id number]    Department[facility]  [facility]   Date of Visit:  2/12/2018           Patient Information     Date Of Birth          1975        Your diagnoses for this visit were:     Headache syndrome        You were seen by Christa Mendoza NP.      Follow-up Information     Follow up with Robin Segovia MD In 1 day.    Specialty:  Family Practice    Why:  if not improving    Contact information:    [facility]  [address] [place] [address]  [phone]          Discharge Instructions         Rest and push fluids at home.   Call for follow up visit to see PCP tomorrow if symptoms persist.          Self-Care for Headaches  Most headaches aren't serious and can be relieved with self-care. But some headaches may be a sign of another health problem like eye trouble or high blood pressure. To find the best treatment, learn what kind of headaches you get. For tension headaches, self-care will usually help. To treat migraines, ask your healthcare provider for advice. It is also possible to get both tension and migraine headaches. Self-care involves relieving the pain and avoiding headache  triggers  if you can.    Ways to reduce pain and tension  Try these steps:    Apply a cold compress or ice pack to the pain site.    Drink fluids. If nausea makes it hard to drink, try sucking on ice.    Rest. Protect yourself from bright light and loud noises.    Calm your emotions by imagining a peaceful scene.    Massage tight neck, shoulder, and head muscles.    To relax muscles, soak in a hot bath or use a hot shower.  Use medicines  Aspirin or aspirin substitutes, such as ibuprofen and acetaminophen, can relieve headache. Remember: Never give aspirin to anyone 18 years old or younger because of the risk of developing Reye syndrome. Use pain  "medicines only when necessary.  Track your headaches  Keeping a headache diary can help you and your healthcare provider identify what's causing your headaches:    Note when each headache happens.    Identify your activities and the foods you've eaten 6 to 8 hours before the headache began.    Look for any trends or \"triggers.\"  Signs of tension headache  Any of the following can be signs:    Dull pain or feeling of pressure in a tight band around your head    Pain in your neck or shoulders    Headache without a definite beginning or end    Headache after an activity such as driving or working on a computer  Signs of migraine  Any of the following can be signs:    Throbbing pain on one or both sides of your head    Nausea or vomiting    Extreme sensitivity to light, sound, and smells    Bright spots, flashes, or other visual changes    Pain or nausea so severe that you can't continue your daily activities          Review of your medicines      Our records show that you are taking the medicines listed below. If these are incorrect, please call your family doctor or clinic.        Dose / Directions Last dose taken    acetaminophen-codeine 300-30 MG per tablet   Commonly known as:  TYLENOL WITH CODEINE #3   Quantity:  60 tablet        1-2 tabs up to twice daily as needed.   Refills:  0        aspirin-acetaminophen-caffeine 250-250-65 MG per tablet   Commonly known as:  EXCEDRIN MIGRAINE   Dose:  1 tablet        Take 1 tablet by mouth every 6 hours as needed for headaches   Refills:  0        clonazePAM 1 MG tablet   Commonly known as:  klonoPIN   Dose:  1 mg        Take 1 mg by mouth   Refills:  0        ibuprofen 800 MG tablet   Commonly known as:  ADVIL/MOTRIN   Quantity:  60 tablet        TAKE 1 TABLET EVERY 8 HOURS AS NEEDED FOR MODERATE PAIN   Refills:  5        IMITREX PO   Dose:  100 mg        Take 100 mg by mouth at onset of headache for migraine May repeat once   Refills:  0        MULTIVITAMIN ADULT PO        " "Take by mouth daily   Refills:  0                Orders Needing Specimen Collection     None      Pending Results     No orders found from 2/10/2018 to 2018.            Pending Culture Results     No orders found from 2/10/2018 to 2018.            Thank you for choosing [facility]       Thank you for choosing [facility] for your care. Our goal is always to provide you with excellent care. Hearing back from our patients is one way we can continue to improve our services. Please take a few minutes to complete the written survey that you may receive in the mail after you visit with us. Thank you!        [email or web address] Information     [email or web address] lets you send messages to your doctor, view your test results, renew your prescriptions, schedule appointments and more. To sign up, go to [email or web address] . Click on \"Log in\" on the left side of the screen, which will take you to the Welcome page. Then click on \"Sign up Now\" on the right side of the page.     You will be asked to enter the access code listed below, as well as some personal information. Please follow the directions to create your username and password.     Your access code is: [email or web address]  Expires: 2018  3:48 PM     Your access code will  in 90 days. If you need help or a new code, please call your [facility] clinic or [phone].        Care EveryWhere ID     This is your Care EveryWhere ID. This could be used by other organizations to access your [facility] medical records  [id number]        Equal Access to Services     [facility] AH: Hadii lis patricia Somanoj, waaxda luqadaha, qaybta kaalmada adecaryyada, erik garnica. So [facility] [phone].    ATENCIÓN: Si habla español, tiene a baltazra disposición servicios gratuitos de asistencia lingüística. Llneeru al [phone].    We comply with applicable federal civil rights laws and [place] laws. We do not discriminate on the basis of race, color, national origin, age, " disability, sex, sexual orientation, or gender identity.            After Visit Summary       This is your record. Keep this with you and show to your community pharmacist(s) and doctor(s) at your next visit.

## 2018-02-12 NOTE — ED AVS SNAPSHOT
[facility]    [address] [address]    Phone:  [phone]                                       Venu Amaya   MRN: [id number]    Department[facility]  [facility]   Date of Visit:  2/12/2018           After Visit Summary Signature Page     I have received my discharge instructions, and my questions have been answered. I have discussed any challenges I see with this plan with the nurse or doctor.    ..........................................................................................................................................  Patient/Patient Representative Signature      ..........................................................................................................................................  Patient Representative Print Name and Relationship to Patient    ..................................................               ................................................  Date                                            Time    ..........................................................................................................................................  Reviewed by Signature/Title    ...................................................              ..............................................  Date                                                            Time

## 2018-03-10 ENCOUNTER — HOSPITAL ENCOUNTER (EMERGENCY)
Facility: HOSPITAL | Age: 43
Discharge: HOME | End: 2018-03-10
Attending: FAMILY MEDICINE | Admitting: FAMILY MEDICINE
Payer: COMMERCIAL

## 2018-03-10 VITALS
RESPIRATION RATE: 14 BRPM | HEART RATE: 91 BPM | DIASTOLIC BLOOD PRESSURE: 87 MMHG | WEIGHT: 110 LBS | OXYGEN SATURATION: 99 % | SYSTOLIC BLOOD PRESSURE: 111 MMHG | BODY MASS INDEX: 19.49 KG/M2 | HEIGHT: 63 IN | TEMPERATURE: 98.1 F

## 2018-03-10 DIAGNOSIS — M26.609 TMJ (TEMPOROMANDIBULAR JOINT SYNDROME): ICD-10-CM

## 2018-03-10 PROCEDURE — 99282 EMERGENCY DEPT VISIT SF MDM: CPT

## 2018-03-10 PROCEDURE — 99283 EMERGENCY DEPT VISIT LOW MDM: CPT | Performed by: FAMILY MEDICINE

## 2018-03-10 ASSESSMENT — ENCOUNTER SYMPTOMS
NERVOUS/ANXIOUS: 1
LIGHT-HEADEDNESS: 0
FATIGUE: 0
VOICE CHANGE: 0
COLOR CHANGE: 0
ACTIVITY CHANGE: 0
FACIAL SWELLING: 1
SHORTNESS OF BREATH: 0
WEAKNESS: 0
JOINT SWELLING: 1
TROUBLE SWALLOWING: 0
DIZZINESS: 0
SPEECH DIFFICULTY: 0
DIAPHORESIS: 0

## 2018-03-10 NOTE — ED AVS SNAPSHOT
[facility]    [address] [address]    Phone:  [phone]                                       Venu Amaya   MRN: [id number]    Department:  [facility]   Date of Visit:  3/10/2018           Patient Information     Date Of Birth          1975        Your diagnoses for this visit were:     TMJ (temporomandibular joint syndrome) right       You were seen by Arianne Fenton MD.      Follow-up Information     Follow up with Robin Segovia MD In 3 days.    Specialty:  Family Practice    Why:  As needed, If symptoms worsen, or fail to improve    Contact information:    [facility]  [address] [place] [id number] [phone]          Discharge Instructions         When You Have Temporomandibular Disorder (TMD)  The temporomandibular joint (TMJ) is a ball-and-socket joint located where the upper and lower jaws meet. The TMJ and its nearby muscles make up a complex, loosely connected system. Because of this, a problem in one part of the system can affect the other parts. This can cause you to have temporomandibular disorder (TMD).         How the temporomandibular joint works  You have 1 joint on each side of your mouth that together make up the TMJ. These joints are part of a large group of muscles, ligaments, and bones that work together as a system. When the system is healthy, you can talk, chew, and even yawn in comfort. Muscles contract and relax to open and close the joint. The disk is made of cartilage and is located between the condyle and the skull. It absorbs pressure in the joint and allows the jaws to open and close smoothly. Fluid (called synovial fluid) lubricates the joints. Ligaments connect the lower jaw bones to the skull. They also support the joint.  Common temporomandibular problems  When there is a problem with the TMJ and its related system, you can develop TMD. Common TMD problems include tight muscles, inflamed  joints, and damaged joints.  In some cases, symptoms may be related to the teeth or bite.    Tight muscles. The muscles surrounding the TMJ can go into spasm (tighten) and cause pain. Referred pain happens in a part of the body separate from the source of the problem. For example, pain in the face or teeth could be coming from a problem in the TMJ. Myofascial pain happens in soft tissues, like muscle. Trigger points in these pain areas often cause referred pain. You may feel jaw, neck, or shoulder pain.    Inflamed joints. Inflammation may include pain, redness, heat, swelling, or loss of function. Synovitis happens when certain tissues surrounding the TMJ become inflamed. It causes pain that increases with jaw movement. Inflamed ligamentscan be caused by strain or injury. When this happens, the ligaments are unable to support the joint. Rheumatoid arthritis is a joint disease. It leads to inflammation in the TMJ.    Damaged joints. Many people hear clicking when their jaw moves. If you feel pain along with the noise, the joint may be damaged. Impingement happens when the disk slips out of place (displacement). This causes the jaw to catch. As the disk slips, you may hear a clicking sound. Locked jaw happens when the disk gets stuck in one position. As a result, the jaw locks open or closed. Osteoarthritis is a joint disease. It causes the TMJ to wear away (degenerate). This leads to pain during movement.     Other problems  The parts of the jaw and mouth make up a single unit. That s why a problem in 1 area can cause symptoms elsewhere. Teeth or bite problems associated with TMD include:    Bruxism (grinding your teeth side to side)    Clenching (biting down on your teeth)    Malocclusion (when the teeth or bite is out of alignment)  Your health care provider will give you more information about these problems if needed.   Date Last Reviewed: 7/13/2015 2000-2017 [employer]. [address] [facility],  "[place] [address]. All rights reserved. This information is not intended as a substitute for professional medical care. Always follow your healthcare professional's instructions.             Review of your medicines      Our records show that you are taking the medicines listed below. If these are incorrect, please call your family doctor or clinic.        Dose / Directions Last dose taken    acetaminophen-codeine 300-30 MG per tablet   Commonly known as:  TYLENOL WITH CODEINE #3   Quantity:  60 tablet        1-2 tabs up to twice daily as needed.   Refills:  0        aspirin-acetaminophen-caffeine 250-250-65 MG per tablet   Commonly known as:  EXCEDRIN MIGRAINE   Dose:  1 tablet        Take 1 tablet by mouth every 6 hours as needed for headaches   Refills:  0        clonazePAM 1 MG tablet   Commonly known as:  klonoPIN   Dose:  1 mg        Take 1 mg by mouth   Refills:  0        FLEXERIL PO        Refills:  0        IMITREX PO   Dose:  100 mg        Take 100 mg by mouth at onset of headache for migraine May repeat once   Refills:  0        MULTIVITAMIN ADULT PO        Take by mouth daily   Refills:  0                Orders Needing Specimen Collection     None      Pending Results     No orders found from 3/8/2018 to 3/11/2018.            Pending Culture Results     No orders found from 3/8/2018 to 3/11/2018.            Thank you for choosing [facility]       Thank you for choosing [facility] for your care. Our goal is always to provide you with excellent care. Hearing back from our patients is one way we can continue to improve our services. Please take a few minutes to complete the written survey that you may receive in the mail after you visit with us. Thank you!        [employer] Information     [employer] lets you send messages to your doctor, view your test results, renew your prescriptions, schedule appointments and more. To sign up, go to [email or web address] . Click on \"Log in\" on the left side of the screen, " "which will take you to the Welcome page. Then click on \"Sign up Now\" on the right side of the page.     You will be asked to enter the access code listed below, as well as some personal information. Please follow the directions to create your username and password.     Your access code is: [email or web address]  Expires: 2018  3:48 PM     Your access code will  in 90 days. If you need help or a new code, please call your [facility] clinic or [phone].        Care EveryWhere ID     This is your Care EveryWhere ID. This could be used by other organizations to access your [facility] medical records  [id number]        Equal Access to Services     KING TREVINO : Buddy Magana, lukas ramsey, caroline ashton, erik garnica. So [facility] [phone].    ATENCIÓN: Si habla español, tiene a baltazar disposición servicios gratuitos de asistencia lingüística. Llame al [phone].    We comply with applicable federal civil rights laws and [place] laws. We do not discriminate on the basis of race, color, national origin, age, disability, sex, sexual orientation, or gender identity.            After Visit Summary       This is your record. Keep this with you and show to your community pharmacist(s) and doctor(s) at your next visit.                  "

## 2018-03-10 NOTE — ED NOTES
Patient arrives accompanied by family member with complaint of right ear pressure and right jaw swelling. Patient had a cervical fusion on February 22nd and states today she could feel the right side of her face getting swollen with ear pressure. Patient stating its not really a pain, more of a pressure. Dressing over incision is C/D/I. Incision remains sutured, patient stating she did not go to her follow up appointment this last week because she forgot. Denies fevers. Call light within reach.

## 2018-03-10 NOTE — ED AVS SNAPSHOT
[facility]    [address] [address]    Phone:  [phone]                                       Venu Amaya   MRN: [id number]    Department:  [facility]   Date of Visit:  3/10/2018           After Visit Summary Signature Page     I have received my discharge instructions, and my questions have been answered. I have discussed any challenges I see with this plan with the nurse or doctor.    ..........................................................................................................................................  Patient/Patient Representative Signature      ..........................................................................................................................................  Patient Representative Print Name and Relationship to Patient    ..................................................               ................................................  Date                                            Time    ..........................................................................................................................................  Reviewed by Signature/Title    ...................................................              ..............................................  Date                                                            Time

## 2018-03-11 NOTE — ED PROVIDER NOTES
History     Chief Complaint   Patient presents with     Post-op Problem     cervical fusion 2/22.  increased swelling an pain     HPI  Venu Amaya is a 42 year old female who comes to the ER complaining of pain in her right side of her face and just below the angle of her jaw.  This started early this morning, has gotten worse through the day.  She was eating a doughnut at the time that the pain started, she had not had pain there prior to that.  She also has had surgery for cervical fusion 2/22/2018 and the incision for that is on the right side of the neck anteriorly.  She states that it feels swollen.  Her biggest concern is that it may be a complication from her fusion and when she called the nurse line she is advised to come to the ER.    Problem List:    Patient Active Problem List    Diagnosis Date Noted     Neural foraminal stenosis of cervical spine 10/05/2017     Priority: Medium     Opioid dependence (H) 12/23/2015     Priority: Medium     Long term (current) use of opiate analgesic 12/23/2015     Priority: Medium     Chronic low back pain 11/02/2015     Priority: Medium     Overview:   IMO Update       Headache 10/23/2014     Priority: Medium     Overview:   10/23/14: According to neurology consultation of 9/2/14, previous treatments  include pretty much all of the triptans, Maxalt, Imitrex, Amerge, Axert, Frova, and Zomig. She reports that she has tried these when she did not have a constant headache and they did not work when she took them at first onset nor do they work now. She recently tried Imitrex injections and has tried a nasal spray in the past without improvement. The patient does take Compazine for the nausea and reports that that helps somewhat. She has tried numerous preventative medications as well including Topamax, Depakote, gabapentin, Tegretol, Dilantin, nortriptyline, propranolol, and verapamil per her report. She has tried physical therapy as well as trigger injections and  denies that any of these things are helpful. She does report she takes ibuprofen 800 mg twice per day for ankylosing spondylitis. For her headaches, the only thing she has found that helped is Tylenol with Codeine.        Cervicalgia 05/15/2014     Priority: Medium     Myalgia and myositis 05/15/2014     Priority: Medium     Abnormal pap 07/30/2013     Priority: Medium     Ankylosing spondylitis (H) 05/24/2012     Priority: Medium     Flori AN        Dysthymic disorder 09/13/2010     Priority: Medium     Anxiety state 06/02/2009     Priority: Medium     Problem list name updated by automated process. Provider to review       Depressive disorder, not elsewhere classified 10/31/2007     Priority: Medium     Migraine 01/15/2003     Priority: Medium     Problem list name updated by automated process. Provider to review          Past Medical History:    Past Medical History:   Diagnosis Date     Abnormal Pap smear and cervical HPV (human papillo 05/25/2011     Ankylosing spondylitis (H) 05/24/2012     Anxiety state, unspecified 06/02/2009     Cervicalgia pain (neck) 11/03/2011     Chronic Headache disorder  05/25/2011     Depressive disorder, not elsewhere classified 10/31/2007     Dysmenorrhea 04/03/2012     Habitual aborter, antepartum condition or complica 02/10/2005     Inguinal hernia without mention of obstruction or gangrene, unilateral or unspecified, (not specified as recurrent) 06/16/2008     Irregular menstrual cycle 05/25/2011     Mental disorders of mother, postpartum 10/06/2005     Migraine, unspecified, without mention of intractable migraine without mention of status migrainosus 01/15/2003     Moderate dysplasia of cervix 11/03/2011     Pain in thoracic spine 11/03/2011     Scoliosis (and kyphoscoliosis), idiopathic 05/16/2011       Past Surgical History:    Past Surgical History:   Procedure Laterality Date     inguinal hernia repair, Germain repair  10/14/2008    RT      TUBAL LIGATION  "        Family History:    Family History   Problem Relation Age of Onset     CANCER Mother      lymphoma     CANCER Other      CANCER Other      Colon Cancer Sister        Social History:  Marital Status:   [2]  Social History   Substance Use Topics     Smoking status: Former Smoker     Packs/day: 0.00     Types: Cigarettes     Quit date: 7/13/2015     Smokeless tobacco: Never Used      Comment: Passive Exposure: Yes      Alcohol use No        Medications:      Cyclobenzaprine HCl (FLEXERIL PO)   aspirin-acetaminophen-caffeine (EXCEDRIN MIGRAINE) 250-250-65 MG per tablet   SUMAtriptan Succinate (IMITREX PO)   clonazePAM (KLONOPIN) 1 MG tablet   acetaminophen-codeine (TYLENOL/CODEINE #3) 300-30 MG per tablet   Multiple Vitamins-Minerals (MULTIVITAMIN ADULT PO)   [DISCONTINUED] PRENATAL VITAMINS PO         Review of Systems   Constitutional: Negative for activity change, diaphoresis and fatigue.   HENT: Positive for facial swelling. Negative for trouble swallowing and voice change.         See HPI.   Respiratory: Negative for shortness of breath.    Cardiovascular: Negative for chest pain.   Musculoskeletal: Positive for joint swelling.        Right TMJ   Skin: Negative for color change.        No erythema or ecchymosis   Neurological: Negative for dizziness, speech difficulty, weakness and light-headedness.   Psychiatric/Behavioral: The patient is nervous/anxious.        Physical Exam   BP: 106/70  Heart Rate: 95  Temp: 98.7  F (37.1  C)  Resp: 16  Height: 160 cm (5' 3\")  Weight: 49.9 kg (110 lb)  SpO2: 97 %      Physical Exam   Constitutional: She is oriented to person, place, and time. She appears well-developed and well-nourished. No distress.   HENT:   Head: Normocephalic and atraumatic.       Right Ear: Tympanic membrane is not erythematous.   Left Ear: Tympanic membrane is not erythematous.   Mouth/Throat: Oropharynx is clear and moist.   Neck:   ROM limited by cervical fusion, is still in c-collar but " took it off for exam.   Cardiovascular: Normal rate and regular rhythm.    Pulmonary/Chest: Effort normal and breath sounds normal.   Neurological: She is alert and oriented to person, place, and time.   Skin: Skin is warm and dry.   Psychiatric: Her speech is normal. Judgment normal. Her mood appears anxious.   Nursing note and vitals reviewed.      ED Course     ED Course     Procedures  No results found for this or any previous visit (from the past 24 hour(s)).    Assessments & Plan (with Medical Decision Making)   Patient's main concern is that this was relative to her cervical fusion.  The pain was entirely isolated to the area around the TMJ, did not approach the area of the incision.  There is no erythema at the incision, there is no erythema at the area of pain.  There is visible swelling, mild.  Spoke with Dr. Kramer in neurosurgery at [facility], he stated that he did not believe it had anything to do with the surgery and that it likely was TMJ dysfunction.  Reassured the patient, advised her to use her pain medication as needed and to ice and use heat for comfort as needed.    I have reviewed the nursing notes.    I have reviewed the findings, diagnosis, plan and need for follow up with the patient.  New Prescriptions    No medications on file       Final diagnoses:   TMJ (temporomandibular joint syndrome) - right       3/10/2018   [facility]     Arianne Fenton MD  03/10/18 1946

## 2018-03-11 NOTE — DISCHARGE INSTRUCTIONS
When You Have Temporomandibular Disorder (TMD)  The temporomandibular joint (TMJ) is a ball-and-socket joint located where the upper and lower jaws meet. The TMJ and its nearby muscles make up a complex, loosely connected system. Because of this, a problem in one part of the system can affect the other parts. This can cause you to have temporomandibular disorder (TMD).         How the temporomandibular joint works  You have 1 joint on each side of your mouth that together make up the TMJ. These joints are part of a large group of muscles, ligaments, and bones that work together as a system. When the system is healthy, you can talk, chew, and even yawn in comfort. Muscles contract and relax to open and close the joint. The disk is made of cartilage and is located between the condyle and the skull. It absorbs pressure in the joint and allows the jaws to open and close smoothly. Fluid (called synovial fluid) lubricates the joints. Ligaments connect the lower jaw bones to the skull. They also support the joint.  Common temporomandibular problems  When there is a problem with the TMJ and its related system, you can develop TMD. Common TMD problems include tight muscles, inflamed joints, and damaged joints.  In some cases, symptoms may be related to the teeth or bite.    Tight muscles. The muscles surrounding the TMJ can go into spasm (tighten) and cause pain. Referred pain happens in a part of the body separate from the source of the problem. For example, pain in the face or teeth could be coming from a problem in the TMJ. Myofascial pain happens in soft tissues, like muscle. Trigger points in these pain areas often cause referred pain. You may feel jaw, neck, or shoulder pain.    Inflamed joints. Inflammation may include pain, redness, heat, swelling, or loss of function. Synovitis happens when certain tissues surrounding the TMJ become inflamed. It causes pain that increases with jaw movement. Inflamed ligamentscan  be caused by strain or injury. When this happens, the ligaments are unable to support the joint. Rheumatoid arthritis is a joint disease. It leads to inflammation in the TMJ.    Damaged joints. Many people hear clicking when their jaw moves. If you feel pain along with the noise, the joint may be damaged. Impingement happens when the disk slips out of place (displacement). This causes the jaw to catch. As the disk slips, you may hear a clicking sound. Locked jaw happens when the disk gets stuck in one position. As a result, the jaw locks open or closed. Osteoarthritis is a joint disease. It causes the TMJ to wear away (degenerate). This leads to pain during movement.     Other problems  The parts of the jaw and mouth make up a single unit. That s why a problem in 1 area can cause symptoms elsewhere. Teeth or bite problems associated with TMD include:    Bruxism (grinding your teeth side to side)    Clenching (biting down on your teeth)    Malocclusion (when the teeth or bite is out of alignment)  Your health care provider will give you more information about these problems if needed.   Date Last Reviewed: 7/13/2015 2000-2017 [employer]. [address] [place] [address], [place] [address]. All rights reserved. This information is not intended as a substitute for professional medical care. Always follow your healthcare professional's instructions.

## 2018-04-16 ENCOUNTER — HOSPITAL ENCOUNTER (EMERGENCY)
Facility: HOSPITAL | Age: 43
Discharge: HOME | End: 2018-04-16
Attending: PHYSICIAN ASSISTANT | Admitting: PHYSICIAN ASSISTANT
Payer: COMMERCIAL

## 2018-04-16 VITALS — DIASTOLIC BLOOD PRESSURE: 63 MMHG | SYSTOLIC BLOOD PRESSURE: 103 MMHG | OXYGEN SATURATION: 97 % | TEMPERATURE: 98.8 F

## 2018-04-16 DIAGNOSIS — G44.89 OTHER HEADACHE SYNDROME: ICD-10-CM

## 2018-04-16 PROCEDURE — 96372 THER/PROPH/DIAG INJ SC/IM: CPT

## 2018-04-16 PROCEDURE — G0463 HOSPITAL OUTPT CLINIC VISIT: HCPCS | Mod: 25

## 2018-04-16 PROCEDURE — 99213 OFFICE O/P EST LOW 20 MIN: CPT | Performed by: PHYSICIAN ASSISTANT

## 2018-04-16 PROCEDURE — 25000128 H RX IP 250 OP 636: Performed by: PHYSICIAN ASSISTANT

## 2018-04-16 RX ORDER — SUMATRIPTAN 6 MG/.5ML
6 INJECTION, SOLUTION SUBCUTANEOUS ONCE
Status: COMPLETED | OUTPATIENT
Start: 2018-04-16 | End: 2018-04-16

## 2018-04-16 RX ADMIN — SUMATRIPTAN 6 MG: 6 INJECTION SUBCUTANEOUS at 15:46

## 2018-04-16 ASSESSMENT — ENCOUNTER SYMPTOMS
VOMITING: 0
AGITATION: 0
HEADACHES: 1
LIGHT-HEADEDNESS: 0
DIZZINESS: 0
CONFUSION: 0
PHOTOPHOBIA: 1
NAUSEA: 1
CONSTITUTIONAL NEGATIVE: 1
CARDIOVASCULAR NEGATIVE: 1

## 2018-04-16 NOTE — ED AVS SNAPSHOT
[facility]    [address] [address]    Phone:  [phone]                                       Venu Amaya   MRN: [id number]    Department:  [facility]   Date of Visit:  4/16/2018           Patient Information     Date Of Birth          1975        Your diagnoses for this visit were:     Other headache syndrome        You were seen by Wendy Harkins PA.      Follow-up Information     Follow up with Robin Segovia MD.    Specialty:  Family Practice    Why:  If symptoms worsen    Contact information:    [facility]  [address] [facility] [address] [phone]          Follow up with [facility].    Specialty:  EMERGENCY MEDICINE    Why:  If further concerns develop    Contact information:    [address] [address] [phone]    Additional information:    From [facility]: Take [address]. Turn left at [address]. Turn left at the first stoplight on [address]. At the first stop sign, take a right onto [address]. Take a left into the parking lot and continue through until you reach the North enterance of the building.       From [place]: Take [address]. Take the [address] ramp towards [place]. Turn left onto [address]. Take a slight right onto [address]. Turn left at the first stoplight on [address]. At the first stop sign, take a right onto [address]. Take a left into the parking lot and continue through until you reach the North enterance of the building.       From [place]: Take [address]. Take a right at [address]. At the first stop sign, take a right onto [address]. Take a left into the parking lot and continue through until you reach the North enterance of the building.       Discharge References/Attachments     HEADACHES, SELF-CARE FOR (ENGLISH)    HEADACHE, UNSPECIFIED (ENGLISH)         Review of your  "medicines      Our records show that you are taking the medicines listed below. If these are incorrect, please call your family doctor or clinic.        Dose / Directions Last dose taken    acetaminophen-codeine 300-30 MG per tablet   Commonly known as:  TYLENOL WITH CODEINE #3   Quantity:  60 tablet        1-2 tabs up to twice daily as needed.   Refills:  0        aspirin-acetaminophen-caffeine 250-250-65 MG per tablet   Commonly known as:  EXCEDRIN MIGRAINE   Dose:  1 tablet        Take 1 tablet by mouth every 6 hours as needed for headaches   Refills:  0        clonazePAM 1 MG tablet   Commonly known as:  klonoPIN   Dose:  1 mg        Take 1 mg by mouth   Refills:  0        FLEXERIL PO        Refills:  0        IMITREX PO   Dose:  100 mg        Take 100 mg by mouth at onset of headache for migraine May repeat once   Refills:  0        MULTIVITAMIN ADULT PO        Take by mouth daily   Refills:  0                Orders Needing Specimen Collection     None      Pending Results     No orders found from 4/14/2018 to 4/17/2018.            Pending Culture Results     No orders found from 4/14/2018 to 4/17/2018.            Thank you for choosing [facility]       Thank you for choosing [facility] for your care. Our goal is always to provide you with excellent care. Hearing back from our patients is one way we can continue to improve our services. Please take a few minutes to complete the written survey that you may receive in the mail after you visit with us. Thank you!        [employer] Information     [employer] lets you send messages to your doctor, view your test results, renew your prescriptions, schedule appointments and more. To sign up, go to [email or web address] . Click on \"Log in\" on the left side of the screen, which will take you to the Welcome page. Then click on \"Sign up Now\" on the right side of the page.     You will be asked to enter the access code listed below, as well as some personal information. Please " follow the directions to create your username and password.     Your access code is: [email or web address]  Expires: 2018  4:48 PM     Your access code will  in 90 days. If you need help or a new code, please call your [facility] clinic or [phone].        Care EveryWhere ID     This is your Care EveryWhere ID. This could be used by other organizations to access your [facility] medical records  [id number]        Equal Access to Services     KING TREVINO : Hadii lis jean baptiste hadasho Soomaali, waaxda luqadaha, qaybta kaalmada adeegyaphillip, erik duenas . So [facility] [phone].    ATENCIÓN: Si habla español, tiene a baltazar disposición servicios gratuitos de asistencia lingüística. Llame al [phone].    We comply with applicable federal civil rights laws and [place] laws. We do not discriminate on the basis of race, color, national origin, age, disability, sex, sexual orientation, or gender identity.            After Visit Summary       This is your record. Keep this with you and show to your community pharmacist(s) and doctor(s) at your next visit.

## 2018-04-16 NOTE — ED AVS SNAPSHOT
[facility]    [address] [address]    Phone:  [phone]                                       Venu Amaya   MRN: [id number]    Department:  [facility]   Date of Visit:  4/16/2018           After Visit Summary Signature Page     I have received my discharge instructions, and my questions have been answered. I have discussed any challenges I see with this plan with the nurse or doctor.    ..........................................................................................................................................  Patient/Patient Representative Signature      ..........................................................................................................................................  Patient Representative Print Name and Relationship to Patient    ..................................................               ................................................  Date                                            Time    ..........................................................................................................................................  Reviewed by Signature/Title    ...................................................              ..............................................  Date                                                            Time

## 2018-04-16 NOTE — ED NOTES
Patient presents with migraine X 1430.  Patient states she cant get refill on imitrex until the 19th.

## 2018-04-17 NOTE — ED PROVIDER NOTES
"  History     Chief Complaint   Patient presents with     Headache     Normally goes away with a shot of Imetrex     The history is provided by the patient. No  was used.     Venu Amaya is a 42 year old female who has one of her \"normal\" headaches. Pt is out of her Imitrex and can not get refill until 19 April 2018. Pain is frontal, throbbing. States she has been \"fighting it\" over the last 2 weeks.  Thinks it is worse today because she is due to start her menstruation. Does have some nausea. Declines zofran at this time. I have seen pt for time many times.    Problem List:    Patient Active Problem List    Diagnosis Date Noted     Neural foraminal stenosis of cervical spine 10/05/2017     Priority: Medium     Opioid dependence (H) 12/23/2015     Priority: Medium     Long term (current) use of opiate analgesic 12/23/2015     Priority: Medium     Chronic low back pain 11/02/2015     Priority: Medium     Overview:   IMO Update       Headache 10/23/2014     Priority: Medium     Overview:   10/23/14: According to neurology consultation of 9/2/14, previous treatments  include pretty much all of the triptans, Maxalt, Imitrex, Amerge, Axert, Frova, and Zomig. She reports that she has tried these when she did not have a constant headache and they did not work when she took them at first onset nor do they work now. She recently tried Imitrex injections and has tried a nasal spray in the past without improvement. The patient does take Compazine for the nausea and reports that that helps somewhat. She has tried numerous preventative medications as well including Topamax, Depakote, gabapentin, Tegretol, Dilantin, nortriptyline, propranolol, and verapamil per her report. She has tried physical therapy as well as trigger injections and denies that any of these things are helpful. She does report she takes ibuprofen 800 mg twice per day for ankylosing spondylitis. For her headaches, the only thing she " has found that helped is Tylenol with Codeine.        Cervicalgia 05/15/2014     Priority: Medium     Myalgia and myositis 05/15/2014     Priority: Medium     Abnormal pap 07/30/2013     Priority: Medium     Ankylosing spondylitis (H) 05/24/2012     Priority: Medium     Flori Finch PAC        Dysthymic disorder 09/13/2010     Priority: Medium     Anxiety state 06/02/2009     Priority: Medium     Problem list name updated by automated process. Provider to review       Depressive disorder, not elsewhere classified 10/31/2007     Priority: Medium     Migraine 01/15/2003     Priority: Medium     Problem list name updated by automated process. Provider to review          Past Medical History:    Past Medical History:   Diagnosis Date     Abnormal Pap smear and cervical HPV (human papillo 05/25/2011     Ankylosing spondylitis (H) 05/24/2012     Anxiety state, unspecified 06/02/2009     Cervicalgia pain (neck) 11/03/2011     Chronic Headache disorder  05/25/2011     Depressive disorder, not elsewhere classified 10/31/2007     Dysmenorrhea 04/03/2012     Habitual aborter, antepartum condition or complica 02/10/2005     Inguinal hernia without mention of obstruction or gangrene, unilateral or unspecified, (not specified as recurrent) 06/16/2008     Irregular menstrual cycle 05/25/2011     Mental disorders of mother, postpartum 10/06/2005     Migraine, unspecified, without mention of intractable migraine without mention of status migrainosus 01/15/2003     Moderate dysplasia of cervix 11/03/2011     Pain in thoracic spine 11/03/2011     Scoliosis (and kyphoscoliosis), idiopathic 05/16/2011       Past Surgical History:    Past Surgical History:   Procedure Laterality Date     inguinal hernia repair, Germain repair  10/14/2008    RT      TUBAL LIGATION         Family History:    Family History   Problem Relation Age of Onset     CANCER Mother      lymphoma     CANCER Other      CANCER Other      Colon Cancer Sister         Social History:  Marital Status:   [2]  Social History   Substance Use Topics     Smoking status: Former Smoker     Packs/day: 0.00     Types: Cigarettes     Quit date: 7/13/2015     Smokeless tobacco: Never Used      Comment: Passive Exposure: Yes      Alcohol use No        Medications:      Cyclobenzaprine HCl (FLEXERIL PO)   aspirin-acetaminophen-caffeine (EXCEDRIN MIGRAINE) 250-250-65 MG per tablet   SUMAtriptan Succinate (IMITREX PO)   clonazePAM (KLONOPIN) 1 MG tablet   Multiple Vitamins-Minerals (MULTIVITAMIN ADULT PO)   acetaminophen-codeine (TYLENOL/CODEINE #3) 300-30 MG per tablet   [DISCONTINUED] PRENATAL VITAMINS PO         Review of Systems   Constitutional: Negative.    Eyes: Positive for photophobia. Negative for visual disturbance.   Cardiovascular: Negative.    Gastrointestinal: Positive for nausea. Negative for vomiting.   Neurological: Positive for headaches. Negative for dizziness and light-headedness.   Psychiatric/Behavioral: Negative for agitation, behavioral problems and confusion.       Physical Exam   BP: 103/63  Heart Rate: 96  Temp: 98.8  F (37.1  C)  SpO2: 97 %      Physical Exam   Constitutional: She is oriented to person, place, and time. She appears well-developed and well-nourished. No distress.   HENT:   Head: Normocephalic and atraumatic.   Eyes: Conjunctivae and EOM are normal. Pupils are equal, round, and reactive to light. Right eye exhibits no discharge. Left eye exhibits no discharge.   Cardiovascular: Normal rate.    Pulmonary/Chest: Effort normal.   Neurological: She is alert and oriented to person, place, and time. No cranial nerve deficit. Coordination normal.   Skin: She is not diaphoretic.   Psychiatric: Her affect is blunt. Her speech is delayed. She is slowed. Cognition and memory are normal.   Nursing note and vitals reviewed.      ED Course     ED Course     Procedures      Medications   SUMAtriptan (IMITREX) injection 6 mg (6 mg Subcutaneous Given  4/16/18 1546)     Pt observed x 20 minutes. Pt tolerated well  Assessments & Plan (with Medical Decision Making)     I have reviewed the nursing notes.    I have reviewed the findings, diagnosis, plan and need for follow up with the patient.      Final diagnoses:   Other headache syndrome         Patient verbally educated and given appropriate education sheets for the diagnoses and has no questions.  Take current medications as directed.   Follow up with your Primary Care provider if symptoms increase or if concerns develop, return to the ER  Wendy Harkins Certified  Physician Assistant  4/16/2018  8:34 PM  URGENT CARE CLINIC      4/16/2018   [facility]     Wendy Harkins PA  04/16/18 3234

## 2018-05-04 ENCOUNTER — HOSPITAL ENCOUNTER (EMERGENCY)
Facility: HOSPITAL | Age: 43
Discharge: HOME | End: 2018-05-04
Attending: NURSE PRACTITIONER | Admitting: NURSE PRACTITIONER
Payer: COMMERCIAL

## 2018-05-04 VITALS
TEMPERATURE: 98.8 F | HEART RATE: 89 BPM | DIASTOLIC BLOOD PRESSURE: 66 MMHG | SYSTOLIC BLOOD PRESSURE: 100 MMHG | OXYGEN SATURATION: 98 % | RESPIRATION RATE: 16 BRPM

## 2018-05-04 DIAGNOSIS — G43.009 NONINTRACTABLE MIGRAINE, UNSPECIFIED MIGRAINE TYPE: ICD-10-CM

## 2018-05-04 PROCEDURE — 25000128 H RX IP 250 OP 636: Performed by: NURSE PRACTITIONER

## 2018-05-04 PROCEDURE — 99213 OFFICE O/P EST LOW 20 MIN: CPT | Performed by: NURSE PRACTITIONER

## 2018-05-04 PROCEDURE — 96372 THER/PROPH/DIAG INJ SC/IM: CPT

## 2018-05-04 PROCEDURE — G0463 HOSPITAL OUTPT CLINIC VISIT: HCPCS | Mod: 25

## 2018-05-04 RX ORDER — SUMATRIPTAN 6 MG/.5ML
6 INJECTION, SOLUTION SUBCUTANEOUS ONCE
Status: COMPLETED | OUTPATIENT
Start: 2018-05-04 | End: 2018-05-04

## 2018-05-04 RX ADMIN — SUMATRIPTAN 6 MG: 6 INJECTION SUBCUTANEOUS at 17:38

## 2018-05-04 NOTE — DISCHARGE INSTRUCTIONS
* Migraine Headache  Migraine headaches are related to changes in blood flow to the brain. This causes throbbing or constant pain on one or both sides of the head. The pain may last from a few hours to several days. There is usually nausea, vomiting, sensitivity to light and sound, and blurred vision. A migraine attack may be triggered by emotional stress, hormone changes during the menstrual cycle, oral contraceptives, alcohol use, certain foods containing tyramine, eye strain, weather changes, missing meals, or too little or too much sleep.  Home Care For This Headache:  1) If you were given pain medicine for this headache, do not drive yourself home . Arrange for a ride, instead. When you get home, try to sleep. You should feel much better when you wake up.  2) Migraine headaches may improve with an ice pack on the forehead or at the base of the skull. Heat to the back of your neck may relieve any neck spasm.  3) Drink only clear liquids or eat a very light diet to avoid nausea/vomiting until symptoms improve.  Preventing Future Headaches:  1) Pay attention to those factors that seem to trigger your headache. Try to avoid them when you can. If you have frequent headaches, it is useful to keep a diary of what you were doing, feeling or eating in the hours before each attack. Show this to your doctor to help find the cause of your headaches.  a) If you feel that stress is a factor in your headaches, look at the sources of stress in your life. Find ways to release the build-up of those stresses by using regular exercise, relaxation methods (yoga, meditation), bio-feedback or simply taking time-out for yourself. For more information about this, consult your doctor or go to a local bookstore and review books and tapes on this subject.  b) Tyramine is a substance present in the following foods : chocolate, yogurt, all cheeses except cottage cheese and cream cheese. smoked or pickled fish and meat (including herring,  caviar, bologna, pepperoni, salami), liver, avocados, bananas, figs, raisins, and red wine. Be aware that these foods may trigger a migraine in some persons. Try taking these foods out of your diet for 1-2 months to see if this reduces headache frequency.  Treating Future Attacks:  1) At the first sign of a migraine headache, take a medicine to stop it if one has been prescribed for you. If not, take acetaminophen (Tylenol) or ibuprofen (Motrin, Advil) if you are able to take these. The sooner you take medicine, the better it will work.  2) You may also want to find a quiet, dark, comfortable place to sit or lie down. Let yourself relax or sleep.  3) An ice pack on the forehead or area of greatest pain may also help.   Follow Up  with your doctor if the headache is not better within the next 24 hours. If you have frequent headaches you should discuss a treatment plan with your primary care doctor. Ask if you can have medicine to take at home the next time you get a bad headache. Poorly controlled chronic headaches may require a referral to a neurologist (headache specialist).  Get Prompt Medical Attention  if any of the following occur:    Your head pain gets worse, or does not improve within 24 hours    Repeated vomiting (can t keep liquids down)    Sinus or ear or throat pain (not already reported)    Fever of 101  F (38.3  C) or higher, or as directed by your healthcare provider    Stiff neck    Extreme drowsiness, confusion or fainting    Weakness of an arm or leg or one side of the face    Difficulty with speech or vision    1450-2387 [employer]. [address] [place] [address], [place] [address]. All rights reserved. This information is not intended as a substitute for professional medical care. Always follow your healthcare professional's instructions.  This information has been modified by your health care provider with permission from the publisher.

## 2018-05-04 NOTE — ED AVS SNAPSHOT
[facility]    [address] [address]    Phone:  [phone]                                       Venu Amaya   MRN: [id number]    Department:  [facility]   Date of Visit:  5/4/2018           Patient Information     Date Of Birth          1975        Your diagnoses for this visit were:     Nonintractable migraine, unspecified migraine type        You were seen by Kay Betts NP.      Follow-up Information     Follow up with [facility].    Specialty:  EMERGENCY MEDICINE    Why:  As needed, If symptoms worsen, or concerns develop    Contact information:    [address] [address] [phone]    Additional information:    From [facility]: Take [address]. Turn left at [address]. Turn left at the first stoplight on [address] [address]. At the first stop sign, take a right onto [address]. Take a left into the parking lot and continue through until you reach the North enterance of the building.       From [place]: Take [address]. Take the [address] ramp towards [place]. Turn left onto [address]. Take a slight right onto [address]. Turn left at the first stoplight on [address]. At the first stop sign, take a right onto [address]. Take a left into the parking lot and continue through until you reach the North enterance of the building.       From [place]: Take [address]. Take a right at [address] [address]. At the first stop sign, take a right onto [address]. Take a left into the parking lot and continue through until you reach the North enterance of the building.         Follow up with Robin Segovia MD.    Specialty:  Family Practice    Why:  As needed, if symptoms do not improve    Contact information:    [facility]  [address] [facility] [id number] [phone]          Discharge Instructions         * Migraine  Headache  Migraine headaches are related to changes in blood flow to the brain. This causes throbbing or constant pain on one or both sides of the head. The pain may last from a few hours to several days. There is usually nausea, vomiting, sensitivity to light and sound, and blurred vision. A migraine attack may be triggered by emotional stress, hormone changes during the menstrual cycle, oral contraceptives, alcohol use, certain foods containing tyramine, eye strain, weather changes, missing meals, or too little or too much sleep.  Home Care For This Headache:  1) If you were given pain medicine for this headache, do not drive yourself home . Arrange for a ride, instead. When you get home, try to sleep. You should feel much better when you wake up.  2) Migraine headaches may improve with an ice pack on the forehead or at the base of the skull. Heat to the back of your neck may relieve any neck spasm.  3) Drink only clear liquids or eat a very light diet to avoid nausea/vomiting until symptoms improve.  Preventing Future Headaches:  1) Pay attention to those factors that seem to trigger your headache. Try to avoid them when you can. If you have frequent headaches, it is useful to keep a diary of what you were doing, feeling or eating in the hours before each attack. Show this to your doctor to help find the cause of your headaches.  a) If you feel that stress is a factor in your headaches, look at the sources of stress in your life. Find ways to release the build-up of those stresses by using regular exercise, relaxation methods (yoga, meditation), bio-feedback or simply taking time-out for yourself. For more information about this, consult your doctor or go to a local bookstore and review books and tapes on this subject.  b) Tyramine is a substance present in the following foods : chocolate, yogurt, all cheeses except cottage cheese and cream cheese. smoked or pickled fish and meat (including herring, caviar,  bologna, pepperoni, salami), liver, avocados, bananas, figs, raisins, and red wine. Be aware that these foods may trigger a migraine in some persons. Try taking these foods out of your diet for 1-2 months to see if this reduces headache frequency.  Treating Future Attacks:  1) At the first sign of a migraine headache, take a medicine to stop it if one has been prescribed for you. If not, take acetaminophen (Tylenol) or ibuprofen (Motrin, Advil) if you are able to take these. The sooner you take medicine, the better it will work.  2) You may also want to find a quiet, dark, comfortable place to sit or lie down. Let yourself relax or sleep.  3) An ice pack on the forehead or area of greatest pain may also help.   Follow Up  with your doctor if the headache is not better within the next 24 hours. If you have frequent headaches you should discuss a treatment plan with your primary care doctor. Ask if you can have medicine to take at home the next time you get a bad headache. Poorly controlled chronic headaches may require a referral to a neurologist (headache specialist).  Get Prompt Medical Attention  if any of the following occur:    Your head pain gets worse, or does not improve within 24 hours    Repeated vomiting (can t keep liquids down)    Sinus or ear or throat pain (not already reported)    Fever of 101  F (38.3  C) or higher, or as directed by your healthcare provider    Stiff neck    Extreme drowsiness, confusion or fainting    Weakness of an arm or leg or one side of the face    Difficulty with speech or vision    9552-9180 [employer]. [address] [place] [address], [place] [address]. All rights reserved. This information is not intended as a substitute for professional medical care. Always follow your healthcare professional's instructions.  This information has been modified by your health care provider with permission from the publisher.          Your next 10 appointments already scheduled     May  07, 2018 10:45 AM CDT   (Arrive by 10:30 AM)   SHORT with Vani Javed MD   [facility] ([facility] )    [address]  [place] [address]   [phone]           Please have the patient arrive 15 minutes early.            May 08, 2018  8:45 AM CDT   (Arrive by 8:30 AM)   SHORT with Theo Barnett MD   [facility] ([facility] )    [address]  [place] [address]   [phone]                 Review of your medicines      Our records show that you are taking the medicines listed below. If these are incorrect, please call your family doctor or clinic.        Dose / Directions Last dose taken    acetaminophen-codeine 300-30 MG per tablet   Commonly known as:  TYLENOL WITH CODEINE #3   Quantity:  60 tablet        1-2 tabs up to twice daily as needed.   Refills:  0        aspirin-acetaminophen-caffeine 250-250-65 MG per tablet   Commonly known as:  EXCEDRIN MIGRAINE   Dose:  1 tablet        Take 1 tablet by mouth every 6 hours as needed for headaches   Refills:  0        clonazePAM 1 MG tablet   Commonly known as:  klonoPIN   Dose:  1 mg        Take 1 mg by mouth   Refills:  0        FLEXERIL PO        Refills:  0        IMITREX PO   Dose:  100 mg        Take 100 mg by mouth at onset of headache for migraine May repeat once   Refills:  0        MULTIVITAMIN ADULT PO        Take by mouth daily   Refills:  0                Orders Needing Specimen Collection     None      Pending Results     No orders found from 5/2/2018 to 5/5/2018.            Pending Culture Results     No orders found from 5/2/2018 to 5/5/2018.            Thank you for choosing [facility]       Thank you for choosing [facility] for your care. Our goal is always to provide you with excellent care. Hearing back from our patients is one way we can continue to improve our services. Please take a few minutes to complete the written survey that you may receive in the mail after you  "visit with us. Thank you!        [employer] Information     [employer] lets you send messages to your doctor, view your test results, renew your prescriptions, schedule appointments and more. To sign up, go to [email or web address] . Click on \"Log in\" on the left side of the screen, which will take you to the Welcome page. Then click on \"Sign up Now\" on the right side of the page.     You will be asked to enter the access code listed below, as well as some personal information. Please follow the directions to create your username and password.     Your access code is: [email or web address]  Expires: 2018  4:48 PM     Your access code will  in 90 days. If you need help or a new code, please call your [facility] clinic or [phone].        Care EveryWhere ID     This is your Care EveryWhere ID. This could be used by other organizations to access your [facility] medical records  [id number]        Equal Access to Services     KING TREVINO : Hadii aad ku hadasho Sojessicaali, waaxda luqadaha, qaybta kaalmada adeegbatsheva, erik duenas . So [facility] [phone].    ATENCIÓN: Si habla español, tiene a baltazar disposición servicios gratuitos de asistencia lingüística. Llame al [phone].    We comply with applicable federal civil rights laws and [place] laws. We do not discriminate on the basis of race, color, national origin, age, disability, sex, sexual orientation, or gender identity.            After Visit Summary       This is your record. Keep this with you and show to your community pharmacist(s) and doctor(s) at your next visit.                  "

## 2018-05-04 NOTE — ED AVS SNAPSHOT
[facility]    [address] [address]    Phone:  [phone]                                       Venu Amaya   MRN: [id number]    Department[facility]  [facility]   Date of Visit:  5/4/2018           After Visit Summary Signature Page     I have received my discharge instructions, and my questions have been answered. I have discussed any challenges I see with this plan with the nurse or doctor.    ..........................................................................................................................................  Patient/Patient Representative Signature      ..........................................................................................................................................  Patient Representative Print Name and Relationship to Patient    ..................................................               ................................................  Date                                            Time    ..........................................................................................................................................  Reviewed by Signature/Title    ...................................................              ..............................................  Date                                                            Time

## 2018-05-05 ENCOUNTER — HOSPITAL ENCOUNTER (EMERGENCY)
Facility: HOSPITAL | Age: 43
Discharge: HOME | End: 2018-05-05
Attending: NURSE PRACTITIONER | Admitting: NURSE PRACTITIONER
Payer: COMMERCIAL

## 2018-05-05 VITALS
TEMPERATURE: 98.9 F | DIASTOLIC BLOOD PRESSURE: 72 MMHG | OXYGEN SATURATION: 98 % | SYSTOLIC BLOOD PRESSURE: 104 MMHG | WEIGHT: 110 LBS | HEART RATE: 88 BPM | RESPIRATION RATE: 18 BRPM | BODY MASS INDEX: 19.49 KG/M2

## 2018-05-05 DIAGNOSIS — G43.009 NONINTRACTABLE MIGRAINE, UNSPECIFIED MIGRAINE TYPE: ICD-10-CM

## 2018-05-05 PROCEDURE — 25000128 H RX IP 250 OP 636: Performed by: NURSE PRACTITIONER

## 2018-05-05 PROCEDURE — G0463 HOSPITAL OUTPT CLINIC VISIT: HCPCS | Mod: 25

## 2018-05-05 PROCEDURE — 96372 THER/PROPH/DIAG INJ SC/IM: CPT

## 2018-05-05 PROCEDURE — 99213 OFFICE O/P EST LOW 20 MIN: CPT | Performed by: NURSE PRACTITIONER

## 2018-05-05 RX ORDER — SUMATRIPTAN 6 MG/.5ML
6 INJECTION, SOLUTION SUBCUTANEOUS ONCE
Status: COMPLETED | OUTPATIENT
Start: 2018-05-05 | End: 2018-05-05

## 2018-05-05 RX ADMIN — SUMATRIPTAN 6 MG: 6 INJECTION SUBCUTANEOUS at 17:07

## 2018-05-05 ASSESSMENT — ENCOUNTER SYMPTOMS
ACTIVITY CHANGE: 0
CHILLS: 0
DIARRHEA: 0
VOMITING: 0
DYSURIA: 0
COUGH: 0
HEADACHES: 1
DIZZINESS: 0
FEVER: 0
NECK PAIN: 0
NAUSEA: 0
LIGHT-HEADEDNESS: 0
PSYCHIATRIC NEGATIVE: 1
WHEEZING: 0
ABDOMINAL PAIN: 0
CHILLS: 0
WEAKNESS: 0
HEADACHES: 1
SPEECH DIFFICULTY: 0
NUMBNESS: 0
NUMBNESS: 0
STRIDOR: 0
PHOTOPHOBIA: 1
APPETITE CHANGE: 0
EYE PAIN: 0
FACIAL ASYMMETRY: 0
SHORTNESS OF BREATH: 0
TROUBLE SWALLOWING: 0
WEAKNESS: 0
FATIGUE: 0
FACIAL ASYMMETRY: 0
FEVER: 0
NECK STIFFNESS: 0
APPETITE CHANGE: 0

## 2018-05-05 NOTE — ED PROVIDER NOTES
"  History     Chief Complaint   Patient presents with     Headache     \"has menstrual migraine, wants a Imitrex shot, migraine for 2 days\"  Is texting on phone.     The history is provided by the patient. No  was used.     Venu Amaya is a 42 year old female who presents with a headache that started 2 days ago. She's taken tylenol #3 and Excedrin with mild effectiveness. Imitrex subcutaneous is what has helped in the past. Denies head injury or trauma. This feels like a \"normal\" headache. Eating and drinking well. Bowel and bladder are working well.     She was seen in the urgent care yesterday and was treated with a Imitrex injection.       Problem List:    Patient Active Problem List    Diagnosis Date Noted     Neural foraminal stenosis of cervical spine 10/05/2017     Priority: Medium     Opioid dependence (H) 12/23/2015     Priority: Medium     Long term (current) use of opiate analgesic 12/23/2015     Priority: Medium     Chronic low back pain 11/02/2015     Priority: Medium     Overview:   IMO Update       Headache 10/23/2014     Priority: Medium     Overview:   10/23/14: According to neurology consultation of 9/2/14, previous treatments  include pretty much all of the triptans, Maxalt, Imitrex, Amerge, Axert, Frova, and Zomig. She reports that she has tried these when she did not have a constant headache and they did not work when she took them at first onset nor do they work now. She recently tried Imitrex injections and has tried a nasal spray in the past without improvement. The patient does take Compazine for the nausea and reports that that helps somewhat. She has tried numerous preventative medications as well including Topamax, Depakote, gabapentin, Tegretol, Dilantin, nortriptyline, propranolol, and verapamil per her report. She has tried physical therapy as well as trigger injections and denies that any of these things are helpful. She does report she takes ibuprofen 800 mg " twice per day for ankylosing spondylitis. For her headaches, the only thing she has found that helped is Tylenol with Codeine.        Cervicalgia 05/15/2014     Priority: Medium     Myalgia and myositis 05/15/2014     Priority: Medium     Abnormal pap 07/30/2013     Priority: Medium     Ankylosing spondylitis (H) 05/24/2012     Priority: Medium     Flori AN        Dysthymic disorder 09/13/2010     Priority: Medium     Anxiety state 06/02/2009     Priority: Medium     Problem list name updated by automated process. Provider to review       Depressive disorder, not elsewhere classified 10/31/2007     Priority: Medium     Migraine 01/15/2003     Priority: Medium     Problem list name updated by automated process. Provider to review          Past Medical History:    Past Medical History:   Diagnosis Date     Abnormal Pap smear and cervical HPV (human papillo 05/25/2011     Ankylosing spondylitis (H) 05/24/2012     Anxiety state, unspecified 06/02/2009     Cervicalgia pain (neck) 11/03/2011     Chronic Headache disorder  05/25/2011     Depressive disorder, not elsewhere classified 10/31/2007     Dysmenorrhea 04/03/2012     Habitual aborter, antepartum condition or complica 02/10/2005     Inguinal hernia without mention of obstruction or gangrene, unilateral or unspecified, (not specified as recurrent) 06/16/2008     Irregular menstrual cycle 05/25/2011     Mental disorders of mother, postpartum 10/06/2005     Migraine, unspecified, without mention of intractable migraine without mention of status migrainosus 01/15/2003     Moderate dysplasia of cervix 11/03/2011     Pain in thoracic spine 11/03/2011     Scoliosis (and kyphoscoliosis), idiopathic 05/16/2011       Past Surgical History:    Past Surgical History:   Procedure Laterality Date     inguinal hernia repair, Germain repair  10/14/2008    RT      TUBAL LIGATION         Family History:    Family History   Problem Relation Age of Onset     CANCER  "Mother      lymphoma     CANCER Other      CANCER Other      Colon Cancer Sister        Social History:  Marital Status:   [2]  Social History   Substance Use Topics     Smoking status: Former Smoker     Packs/day: 0.00     Types: Cigarettes     Quit date: 7/13/2015     Smokeless tobacco: Never Used      Comment: Passive Exposure: Yes      Alcohol use No        Medications:      acetaminophen-codeine (TYLENOL/CODEINE #3) 300-30 MG per tablet   aspirin-acetaminophen-caffeine (EXCEDRIN MIGRAINE) 250-250-65 MG per tablet   clonazePAM (KLONOPIN) 1 MG tablet   Cyclobenzaprine HCl (FLEXERIL PO)   Multiple Vitamins-Minerals (MULTIVITAMIN ADULT PO)   SUMAtriptan Succinate (IMITREX PO)   [DISCONTINUED] PRENATAL VITAMINS PO         Review of Systems   Constitutional: Negative for activity change, appetite change, chills and fever.   HENT: Negative for congestion and trouble swallowing.    Eyes: Positive for photophobia. Negative for pain and visual disturbance.   Respiratory: Negative for cough, shortness of breath, wheezing and stridor.    Cardiovascular: Negative for chest pain.   Gastrointestinal: Negative for abdominal pain, diarrhea, nausea and vomiting.   Genitourinary: Negative for dysuria.   Musculoskeletal: Negative for neck pain and neck stiffness.   Skin: Negative for rash.   Neurological: Positive for headaches. Negative for dizziness, syncope, facial asymmetry, speech difficulty, weakness, light-headedness and numbness.        \"Normal headache.\" Bilateral temporal pressure pain.    Psychiatric/Behavioral: Negative.        Physical Exam   BP: 104/72  Pulse: 88  Temp: 98.9  F (37.2  C)  Resp: 18  Weight: 49.9 kg (110 lb)  SpO2: 98 %      Physical Exam   Constitutional: She is oriented to person, place, and time. She appears well-developed and well-nourished. No distress.   HENT:   Head: Normocephalic.   Right Ear: External ear normal.   Left Ear: External ear normal.   Mouth/Throat: Oropharynx is clear and " moist.   Eyes: Conjunctivae and EOM are normal. Pupils are equal, round, and reactive to light. Right eye exhibits no discharge. Left eye exhibits no discharge.   Neck: Normal range of motion. Neck supple.   Cardiovascular: Normal rate, regular rhythm, normal heart sounds and intact distal pulses.    No murmur heard.  Pulmonary/Chest: Effort normal. No respiratory distress. She has no wheezes. She has no rales.   Abdominal: Soft. She exhibits no distension.   Musculoskeletal: Normal range of motion.   Lymphadenopathy:     She has no cervical adenopathy.   Neurological: She is alert and oriented to person, place, and time. She displays normal reflexes. No cranial nerve deficit. She exhibits normal muscle tone. Coordination normal.   CN II-XII are grossly intact.    Skin: Skin is warm and dry. No rash noted. She is not diaphoretic.   Psychiatric: She has a normal mood and affect. Her behavior is normal.   Nursing note and vitals reviewed.      ED Course     ED Course     Procedures    Medications   SUMAtriptan (IMITREX) injection 6 mg (6 mg Subcutaneous Given 5/5/18 1707)     Monitored for 20 minutes and tolerated well. Pain 3/10 and is better. She would like to go home.     Assessments & Plan (with Medical Decision Making)     She is establishing care with Dr. Javed on 5-7-18.     Relief of headache with Imitrex. She would like to go home and will follow up with any increase in symptoms or concerns.     Discussed plan of care. She verbalized understanding. All questions answered.     I have reviewed the nursing notes.    I have reviewed the findings, diagnosis, plan and need for follow up with the patient.  Discharged in stable condition.     New Prescriptions    No medications on file       Final diagnoses:   Nonintractable migraine, unspecified migraine type     Take tylenol and/or ibuprofen for pain. Follow dosing on package.   Increase fluid intake.   Follow up with PCP as scheduled on 5-7-18.   Return to  urgent care or emergency department with any increase in symptoms or concerns.     ALFONSO Greene  5/5/2018  4:41 PM  [facility] CL[facility]       Irene Gresham NP  05/05/18 0486

## 2018-05-05 NOTE — ED TRIAGE NOTES
"Pt presents today with family member with c/o \"menstrual migraine\" x2 days. Pt is requesting an Imitrex shot. Pt is texting on her phone and talking to family member.   "

## 2018-05-05 NOTE — ED AVS SNAPSHOT
[facility]    [address] [address]    Phone:  [phone]                                       Venu Amaya   MRN: [id number]    Department[facility]  [facility]   Date of Visit:  5/5/2018           After Visit Summary Signature Page     I have received my discharge instructions, and my questions have been answered. I have discussed any challenges I see with this plan with the nurse or doctor.    ..........................................................................................................................................  Patient/Patient Representative Signature      ..........................................................................................................................................  Patient Representative Print Name and Relationship to Patient    ..................................................               ................................................  Date                                            Time    ..........................................................................................................................................  Reviewed by Signature/Title    ...................................................              ..............................................  Date                                                            Time

## 2018-05-05 NOTE — ED AVS SNAPSHOT
[facility]    [address] [address]    Phone:  [phone]                                       Venu Amaya   MRN: [id number]    Department:  [facility]   Date of Visit:  5/5/2018           Patient Information     Date Of Birth          1975        Your diagnoses for this visit were:     Nonintractable migraine, unspecified migraine type        You were seen by Irene Gresham NP.      Follow-up Information     Follow up with Robin Segovia MD.    Specialty:  Family Practice    Why:  As needed, If symptoms worsen    Contact information:    [facility]  [address] [facility] [address] [phone]          Follow up with [facility].    Specialty:  EMERGENCY MEDICINE    Why:  As needed, If symptoms worsen    Contact information:    [address] [address] [phone]    Additional information:    From [place]: Take [address]. Turn left at [address]. Turn left at the first stoplight on [address]. At the first stop sign, take a right onto [address]. Take a left into the parking lot and continue through until you reach the North enterance of the building.       From [place]: Take [address]. Take the [address] ramp towards [place]. Turn left onto [address]. Take a slight right onto [address]. Turn left at the first stoplight on [address]. At the first stop sign, take a right onto [address]. Take a left into the parking lot and continue through until you reach the North enterance of the building.       From [place]: Take [address]. Take a right at [address]. At the first stop sign, take a right onto [address]. Take a left into the parking lot and continue through until you reach the North enterance of the building.         Discharge Instructions       Take tylenol and/or ibuprofen for pain. Follow dosing on  package.   Increase fluid intake.   Follow up with PCP as scheduled on 5-7-18.   Return to urgent care or emergency department with any increase in symptoms or concerns.     Discharge References/Attachments     MIGRAINE AND TENSION HEADACHES, WHAT ARE? (ENGLISH)    MIGRAINE HEADACHES, PREVENTING, TRIGGERS (ENGLISH)    HEADACHE, MIGRAINE (CLASSICAL) (ENGLISH)      Your next 10 appointments already scheduled     May 07, 2018 10:45 AM CDT   (Arrive by 10:30 AM)   SHORT with Vani Javed MD   [facility]    [address] [facility] [address]   [phone]           Please have the patient arrive 15 minutes early.            May 08, 2018  8:45 AM CDT   (Arrive by 8:30 AM)   SHORT with Theo Barnett MD   [facility]    [address]  [place] [address]   [phone]                 Review of your medicines      Our records show that you are taking the medicines listed below. If these are incorrect, please call your family doctor or clinic.        Dose / Directions Last dose taken    acetaminophen-codeine 300-30 MG per tablet   Commonly known as:  TYLENOL WITH CODEINE #3   Quantity:  60 tablet        1-2 tabs up to twice daily as needed.   Refills:  0        aspirin-acetaminophen-caffeine 250-250-65 MG per tablet   Commonly known as:  EXCEDRIN MIGRAINE   Dose:  1 tablet        Take 1 tablet by mouth every 6 hours as needed for headaches   Refills:  0        clonazePAM 1 MG tablet   Commonly known as:  klonoPIN   Dose:  1 mg        Take 1 mg by mouth   Refills:  0        FLEXERIL PO        Refills:  0        IMITREX PO   Dose:  100 mg        Take 100 mg by mouth at onset of headache for migraine May repeat once   Refills:  0        MULTIVITAMIN ADULT PO        Take by mouth daily   Refills:  0                Orders Needing Specimen Collection     None      Pending Results     No orders found from 5/3/2018 to  "2018.            Pending Culture Results     No orders found from 5/3/2018 to 2018.            Thank you for choosing [facility]       Thank you for choosing [facility] for your care. Our goal is always to provide you with excellent care. Hearing back from our patients is one way we can continue to improve our services. Please take a few minutes to complete the written survey that you may receive in the mail after you visit with us. Thank you!        [employer] Information     [employer] lets you send messages to your doctor, view your test results, renew your prescriptions, schedule appointments and more. To sign up, go to [email or web address] . Click on \"Log in\" on the left side of the screen, which will take you to the Welcome page. Then click on \"Sign up Now\" on the right side of the page.     You will be asked to enter the access code listed below, as well as some personal information. Please follow the directions to create your username and password.     Your access code is: [email or web address]  Expires: 2018  4:48 PM     Your access code will  in 90 days. If you need help or a new code, please call your [facility] clinic or [phone].        Care EveryWhere ID     This is your Care EveryWhere ID. This could be used by other organizations to access your [facility] medical records  [id number]        Equal Access to Services     [place] T[place]INO : Hadcecilia Magana, waaxda braulio, qaybta kaalmaphillip ashton, erik garnica. So [facility] [phone].    ATENCIÓN: Si habla español, tiene a baltazar disposición servicios gratuitos de asistencia lingüística. Nataliia al [phone].    We comply with applicable federal civil rights laws and [place] laws. We do not discriminate on the basis of race, color, national origin, age, disability, sex, sexual orientation, or gender identity.            After Visit Summary       This is your record. Keep this with you and show to your community " pharmacist(s) and doctor(s) at your next visit.

## 2018-05-05 NOTE — ED PROVIDER NOTES
History     Chief Complaint   Patient presents with     Headache     States she has a headache like her typical migraine, she is out of Imitrex and needs an injection of Imitrex.     The history is provided by the patient. No  was used.     Venu Amaya is a 42 year old female who presents with a CC of migraine headache.  Patient has a history of frequent migraine headaches, reports this is her typical headache.  She denies trauma, fevers, chills.  She has an appointment with PCP next week to start on hormone therapy as migraines seem to be associated with menses.  She is currently out of imitrex and cannot get refill for a few days.  She has not taken anything for symptoms at home.      Problem List:    Patient Active Problem List    Diagnosis Date Noted     Neural foraminal stenosis of cervical spine 10/05/2017     Priority: Medium     Opioid dependence (H) 12/23/2015     Priority: Medium     Long term (current) use of opiate analgesic 12/23/2015     Priority: Medium     Chronic low back pain 11/02/2015     Priority: Medium     Overview:   IMO Update       Headache 10/23/2014     Priority: Medium     Overview:   10/23/14: According to neurology consultation of 9/2/14, previous treatments  include pretty much all of the triptans, Maxalt, Imitrex, Amerge, Axert, Frova, and Zomig. She reports that she has tried these when she did not have a constant headache and they did not work when she took them at first onset nor do they work now. She recently tried Imitrex injections and has tried a nasal spray in the past without improvement. The patient does take Compazine for the nausea and reports that that helps somewhat. She has tried numerous preventative medications as well including Topamax, Depakote, gabapentin, Tegretol, Dilantin, nortriptyline, propranolol, and verapamil per her report. She has tried physical therapy as well as trigger injections and denies that any of these things are  helpful. She does report she takes ibuprofen 800 mg twice per day for ankylosing spondylitis. For her headaches, the only thing she has found that helped is Tylenol with Codeine.        Cervicalgia 05/15/2014     Priority: Medium     Myalgia and myositis 05/15/2014     Priority: Medium     Abnormal pap 07/30/2013     Priority: Medium     Ankylosing spondylitis (H) 05/24/2012     Priority: Medium     Flori AN        Dysthymic disorder 09/13/2010     Priority: Medium     Anxiety state 06/02/2009     Priority: Medium     Problem list name updated by automated process. Provider to review       Depressive disorder, not elsewhere classified 10/31/2007     Priority: Medium     Migraine 01/15/2003     Priority: Medium     Problem list name updated by automated process. Provider to review          Past Medical History:    Past Medical History:   Diagnosis Date     Abnormal Pap smear and cervical HPV (human papillo 05/25/2011     Ankylosing spondylitis (H) 05/24/2012     Anxiety state, unspecified 06/02/2009     Cervicalgia pain (neck) 11/03/2011     Chronic Headache disorder  05/25/2011     Depressive disorder, not elsewhere classified 10/31/2007     Dysmenorrhea 04/03/2012     Habitual aborter, antepartum condition or complica 02/10/2005     Inguinal hernia without mention of obstruction or gangrene, unilateral or unspecified, (not specified as recurrent) 06/16/2008     Irregular menstrual cycle 05/25/2011     Mental disorders of mother, postpartum 10/06/2005     Migraine, unspecified, without mention of intractable migraine without mention of status migrainosus 01/15/2003     Moderate dysplasia of cervix 11/03/2011     Pain in thoracic spine 11/03/2011     Scoliosis (and kyphoscoliosis), idiopathic 05/16/2011       Past Surgical History:    Past Surgical History:   Procedure Laterality Date     inguinal hernia repair, Germain repair  10/14/2008    RT      TUBAL LIGATION         Family History:    Family  History   Problem Relation Age of Onset     CANCER Mother      lymphoma     CANCER Other      CANCER Other      Colon Cancer Sister        Social History:  Marital Status:   [2]  Social History   Substance Use Topics     Smoking status: Former Smoker     Packs/day: 0.00     Types: Cigarettes     Quit date: 7/13/2015     Smokeless tobacco: Never Used      Comment: Passive Exposure: Yes      Alcohol use No        Medications:      acetaminophen-codeine (TYLENOL/CODEINE #3) 300-30 MG per tablet   aspirin-acetaminophen-caffeine (EXCEDRIN MIGRAINE) 250-250-65 MG per tablet   clonazePAM (KLONOPIN) 1 MG tablet   Cyclobenzaprine HCl (FLEXERIL PO)   Multiple Vitamins-Minerals (MULTIVITAMIN ADULT PO)   SUMAtriptan Succinate (IMITREX PO)   [DISCONTINUED] PRENATAL VITAMINS PO         Review of Systems   Constitutional: Negative for appetite change, chills, fatigue and fever.   Neurological: Positive for headaches. Negative for facial asymmetry, weakness and numbness.       Physical Exam   BP: 100/66  Pulse: 89  Temp: 98.8  F (37.1  C)  Resp: 16  SpO2: 98 %      Physical Exam   Constitutional: She is oriented to person, place, and time. She appears well-developed. She is cooperative. She does not appear ill.   HENT:   Head: Normocephalic and atraumatic.   Right Ear: Tympanic membrane, external ear and ear canal normal.   Left Ear: Tympanic membrane, external ear and ear canal normal.   Mouth/Throat: Uvula is midline and oropharynx is clear and moist.   Eyes: Conjunctivae and EOM are normal. Pupils are equal, round, and reactive to light.   Neck: Neck supple.   Cardiovascular: Normal rate and regular rhythm.    Pulmonary/Chest: Effort normal and breath sounds normal.   Lymphadenopathy:     She has no cervical adenopathy.   Neurological: She is alert and oriented to person, place, and time.   Psychiatric: She has a normal mood and affect. Her behavior is normal.   Nursing note and vitals reviewed.      ED Course     ED  Course     Procedures    Medications   SUMAtriptan (IMITREX) injection 6 mg (6 mg Subcutaneous Given 5/4/18 9297)     Observed for a minimum of 20 minutes, tolerated medications well, no adverse efects noted, reports pain is 0/10 on discharge.    Assessments & Plan (with Medical Decision Making)     I have reviewed the nursing notes.    I have reviewed the findings, diagnosis, plan and need for follow up with the patient.  ASSESSMENT / PLAN:  (G43.009) Nonintractable migraine, unspecified migraine type  Comment: improved with imitrex  Plan:  Follow up with PCP this week   Return to ED over the weekend if symptoms return or new concerns develop    Patient verbally educated and given appropriate education sheets for their diagnoses and has all questions answered to the best of my ability.      Discharge Medication List as of 5/4/2018  5:57 PM          Final diagnoses:   Nonintractable migraine, unspecified migraine type       5/4/2018   [facility]     Kay Betts NP  05/05/18 1002

## 2018-05-07 ENCOUNTER — HOSPITAL ENCOUNTER (EMERGENCY)
Facility: HOSPITAL | Age: 43
Discharge: HOME | End: 2018-05-07
Attending: FAMILY MEDICINE | Admitting: FAMILY MEDICINE
Payer: COMMERCIAL

## 2018-05-07 VITALS
OXYGEN SATURATION: 99 % | BODY MASS INDEX: 19.49 KG/M2 | RESPIRATION RATE: 16 BRPM | DIASTOLIC BLOOD PRESSURE: 75 MMHG | HEIGHT: 63 IN | SYSTOLIC BLOOD PRESSURE: 115 MMHG | TEMPERATURE: 97.7 F | WEIGHT: 110 LBS

## 2018-05-07 DIAGNOSIS — G43.919 INTRACTABLE MIGRAINE WITHOUT STATUS MIGRAINOSUS, UNSPECIFIED MIGRAINE TYPE: ICD-10-CM

## 2018-05-07 PROBLEM — Z98.1 S/P CERVICAL SPINAL FUSION: Status: ACTIVE | Noted: 2018-03-29

## 2018-05-07 PROCEDURE — 99283 EMERGENCY DEPT VISIT LOW MDM: CPT

## 2018-05-07 PROCEDURE — 99283 EMERGENCY DEPT VISIT LOW MDM: CPT | Performed by: FAMILY MEDICINE

## 2018-05-07 PROCEDURE — 25000128 H RX IP 250 OP 636: Performed by: FAMILY MEDICINE

## 2018-05-07 RX ORDER — SUMATRIPTAN 6 MG/.5ML
6 INJECTION, SOLUTION SUBCUTANEOUS ONCE
Status: COMPLETED | OUTPATIENT
Start: 2018-05-07 | End: 2018-05-07

## 2018-05-07 RX ADMIN — SUMATRIPTAN 6 MG: 6 INJECTION SUBCUTANEOUS at 09:30

## 2018-05-07 ASSESSMENT — ENCOUNTER SYMPTOMS
ACTIVITY CHANGE: 1
DYSPHORIC MOOD: 1
PHOTOPHOBIA: 1
NAUSEA: 0
CARDIOVASCULAR NEGATIVE: 1
HEADACHES: 1
RESPIRATORY NEGATIVE: 1
FATIGUE: 1

## 2018-05-07 NOTE — DISCHARGE INSTRUCTIONS
About Migraine Headaches  What is a migraine headache?  A migraine is a very painful type of headache. It may last a few hours or days. During a migraine, you may have vision problems and feel sick to your stomach.  Migraines are three times more common in women than in men. Once they start, you may get them for the rest of your life. They may occur less often as you age.  What causes it?  We don't know the exact cause, but many things can trigger a migraine. These include:    Stress and anxiety    Lack of food or sleep    Foods and drinks that contain tyramine, such as:  ? Red efraín and some beers  ? Aged cheeses (like cheddar or blue cheese)  ? Yeast  ? Aged, dried or cured meats  ? Fermented foods like sauerkraut, soy sauce, miso and chucky chi    Too much light    Chemicals (gasoline, cleaning products, perfume, glue, etc.)    Weather changes    Certain medicines    Hormone changes (in women).  What are symptoms?  Some people can tell when they're about to have a migraine. They may see flashing lights or zigzag lines in front of their eyes. Or they may lose their vision for a short time.  With a migraine, you may:    Feel pain or pulsing on one side of the head. For some people, the entire head hurts.    Be very sensitive to light and sound.    Feel nauseated (sick to your stomach) and vomit (throw up).  How is it treated?  Your care team may suggest medicine to prevent or relieve your symptoms. Once you start having migraines, you may also need medicine to keep them from getting worse.   Take your medicine at the first sign of a migraine. It may take several tries to find a medicine that works for you.   When a migraine comes:    Lie down in a quiet, dark room. Try not to bend over, as this may cause more pain.    Put a cold pack on your head. Try a bag of frozen vegetables, wrapped with a thin cloth.    Drink extra fluids. If you can't drink, suck on ice chips.  How can I prevent migraines?  It will help to keep  a migraine diary. By keeping a diary, you may find the cause of your headaches. Once you know the cause, you can take steps to prevent migraines in the future.  It also helps to live a healthy lifestyle. This means:    Get regular exercise. (If exercise triggers your headaches, tell your doctor.)    Drink plenty of water.    Eat healthy meals at regular times.    Try to go to bed and get up and regular times.    Don't smoke. Avoid second-hand smoke.    Avoid caffeine. Coffee, tea and soda may help a migraine. But if you drink them too often, they can cause migraines.    Find ways to relax, have fun and reduce stress in your life.    Try complementary therapies (yoga, acupuncture, massage, biofeedback, etc.).  When should I call my clinic?  Call your clinic at once if you have new or unusual symptoms, such as:     Pain that gets worse or lasts more than 24 hours.    Slurred speech or problems talking.    A weak arm or leg that you can't move normally.    A fever over 100 F (37.8 C), taken under the tongue.    Stiff neck.    Vomiting (throwing up) for several hours.  For more information about migraines  Contact the [employer] Education (ACHE) at 1-966.495.7470 or www.headaches.org.  Local providers of complementary therapies  These services may not be covered by insurance. Check your insurance plan.  [facility]  [phone]   Includes pain education, behavioral therapy,   trigger point injections and more.  [facility]   [phone]   Includes acupuncture, massage, myofascial release.  [facility] at the [facility]  [phone]  [email or web address]  Includes mindfulness, meditation, yoga.  Community Education     [place]: commed.mpls.k12.mn.[facility]. [place]: commedprograms.spps.org  Look for programs on yoga, mindfulness, etc.  [facility]    [phone]  www.[place].org  Includes mindfulness, yoga, body-mind skills.  For informational purposes only. Not to replace the advice of your health care provider.   Copyright   2011 [employer]. All rights reserved. [employer] [id number] - 11/15.

## 2018-05-07 NOTE — ED PROVIDER NOTES
"  History     Chief Complaint   Patient presents with     Headache     Histroy of Migraines      HPI  Venu Amaya is a 42 year old female who presents to the emergency room with menstrual migraine.  She is of the opinion that she is going to be able to be much more comfortable once her hormones are \"balanced\", and her refill on her Imitrex is due in 2 days.  All she wants today is another Imitrex shot.  Discussed with her the fact that there is some  data that says that anti-inflammatories along with Imitrex are potentiating and may indeed make the Imitrex last longer.  She is amenable to taking the ibuprofen when she gets home.    Problem List:    Patient Active Problem List    Diagnosis Date Noted     S/P cervical spinal fusion 03/29/2018     Priority: Medium     Neural foraminal stenosis of cervical spine 10/05/2017     Priority: Medium     Opioid dependence (H) 12/23/2015     Priority: Medium     Long term (current) use of opiate analgesic 12/23/2015     Priority: Medium     Chronic low back pain 11/02/2015     Priority: Medium     Overview:   IMO Update       Headache 10/23/2014     Priority: Medium     Overview:   10/23/14: According to neurology consultation of 9/2/14, previous treatments  include pretty much all of the triptans, Maxalt, Imitrex, Amerge, Axert, Frova, and Zomig. She reports that she has tried these when she did not have a constant headache and they did not work when she took them at first onset nor do they work now. She recently tried Imitrex injections and has tried a nasal spray in the past without improvement. The patient does take Compazine for the nausea and reports that that helps somewhat. She has tried numerous preventative medications as well including Topamax, Depakote, gabapentin, Tegretol, Dilantin, nortriptyline, propranolol, and verapamil per her report. She has tried physical therapy as well as trigger injections and denies that any of these things are helpful. She does " report she takes ibuprofen 800 mg twice per day for ankylosing spondylitis. For her headaches, the only thing she has found that helped is Tylenol with Codeine.        Cervicalgia 05/15/2014     Priority: Medium     Myalgia and myositis 05/15/2014     Priority: Medium     Abnormal pap 07/30/2013     Priority: Medium     Ankylosing spondylitis (H) 05/24/2012     Priority: Medium     Flori AN        Dysthymic disorder 09/13/2010     Priority: Medium     Anxiety state 06/02/2009     Priority: Medium     Problem list name updated by automated process. Provider to review       Depressive disorder, not elsewhere classified 10/31/2007     Priority: Medium     Migraine 01/15/2003     Priority: Medium     Problem list name updated by automated process. Provider to review          Past Medical History:    Past Medical History:   Diagnosis Date     Abnormal Pap smear and cervical HPV (human papillo 05/25/2011     Ankylosing spondylitis (H) 05/24/2012     Anxiety state, unspecified 06/02/2009     Cervicalgia pain (neck) 11/03/2011     Chronic Headache disorder  05/25/2011     Depressive disorder, not elsewhere classified 10/31/2007     Dysmenorrhea 04/03/2012     Habitual aborter, antepartum condition or complica 02/10/2005     Inguinal hernia without mention of obstruction or gangrene, unilateral or unspecified, (not specified as recurrent) 06/16/2008     Irregular menstrual cycle 05/25/2011     Mental disorders of mother, postpartum 10/06/2005     Migraine, unspecified, without mention of intractable migraine without mention of status migrainosus 01/15/2003     Moderate dysplasia of cervix 11/03/2011     Pain in thoracic spine 11/03/2011     Scoliosis (and kyphoscoliosis), idiopathic 05/16/2011       Past Surgical History:    Past Surgical History:   Procedure Laterality Date     inguinal hernia repair, Germain repair  10/14/2008    RT      TUBAL LIGATION         Family History:    Family History   Problem  "Relation Age of Onset     CANCER Mother      lymphoma     CANCER Other      CANCER Other      Colon Cancer Sister        Social History:  Marital Status:   [2]  Social History   Substance Use Topics     Smoking status: Former Smoker     Packs/day: 0.00     Types: Cigarettes     Quit date: 7/13/2015     Smokeless tobacco: Never Used      Comment: Passive Exposure: Yes      Alcohol use No        Medications:      acetaminophen-codeine (TYLENOL/CODEINE #3) 300-30 MG per tablet   aspirin-acetaminophen-caffeine (EXCEDRIN MIGRAINE) 250-250-65 MG per tablet   clonazePAM (KLONOPIN) 1 MG tablet   Multiple Vitamins-Minerals (MULTIVITAMIN ADULT PO)   SUMAtriptan Succinate (IMITREX PO)   TiZANidine HCl (ZANAFLEX PO)   [DISCONTINUED] PRENATAL VITAMINS PO         Review of Systems   Constitutional: Positive for activity change and fatigue.   HENT: Negative.    Eyes: Positive for photophobia.   Respiratory: Negative.    Cardiovascular: Negative.    Gastrointestinal: Negative for nausea.   Skin: Negative.    Neurological: Positive for headaches.   Psychiatric/Behavioral: Positive for dysphoric mood.       Physical Exam   BP: 115/75  Heart Rate: 88  Temp: 97.7  F (36.5  C)  Resp: 16  Height: 160 cm (5' 3\")  Weight: 49.9 kg (110 lb)  SpO2: 99 %      Physical Exam   Constitutional: She is oriented to person, place, and time. She appears well-developed and well-nourished. She appears distressed.   HENT:   Head: Normocephalic and atraumatic.   Neck: Normal range of motion. Neck supple.   Cardiovascular: Normal rate and regular rhythm.    Pulmonary/Chest: Effort normal.   Abdominal: Soft. Bowel sounds are normal. She exhibits no distension. There is no tenderness.   Musculoskeletal: Normal range of motion.   Neurological: She is alert and oriented to person, place, and time.   Skin: Skin is warm and dry.   Psychiatric: She has a normal mood and affect.   Nursing note and vitals reviewed.      ED Course     ED Course "     Procedures  No results found for this or any previous visit (from the past 24 hour(s)).    Medications   SUMAtriptan (IMITREX) injection 6 mg (not administered)       Assessments & Plan (with Medical Decision Making)   Patient given Imitrex and then discharged.  She did not wish to stay, wanted to go home and lie in her own bed.  She plans to take ibuprofen 800 when she gets home.  Follow-up with primary care as needed.    I have reviewed the nursing notes.    I have reviewed the findings, diagnosis, plan and need for follow up with the patient.  New Prescriptions    No medications on file       Final diagnoses:   Intractable migraine without status migrainosus, unspecified migraine type       5/7/2018   [facility]     Arianne Fenton MD  05/07/18 0043

## 2018-05-07 NOTE — ED AVS SNAPSHOT
[facility]    [address] [place] [address]    Phone:  [phone]                                       Venu Amaya   MRN: [id number]    Department:  [facility]   Date of Visit:  5/7/2018           Patient Information     Date Of Birth          1975        Your diagnoses for this visit were:     Intractable migraine without status migrainosus, unspecified migraine type        You were seen by Arianne Fenton MD.      Follow-up Information     Follow up with Robin Segovia MD.    Specialty:  Family Practice    Why:  As needed    Contact information:    [facility]  [address] [place] [id number]  [phone]          Discharge Instructions       About Migraine Headaches  What is a migraine headache?  A migraine is a very painful type of headache. It may last a few hours or days. During a migraine, you may have vision problems and feel sick to your stomach.  Migraines are three times more common in women than in men. Once they start, you may get them for the rest of your life. They may occur less often as you age.  What causes it?  We don't know the exact cause, but many things can trigger a migraine. These include:    Stress and anxiety    Lack of food or sleep    Foods and drinks that contain tyramine, such as:  ? Red efraín and some beers  ? Aged cheeses (like cheddar or blue cheese)  ? Yeast  ? Aged, dried or cured meats  ? Fermented foods like sauerkraut, soy sauce, miso and chucky chi    Too much light    Chemicals (gasoline, cleaning products, perfume, glue, etc.)    Weather changes    Certain medicines    Hormone changes (in women).  What are symptoms?  Some people can tell when they're about to have a migraine. They may see flashing lights or zigzag lines in front of their eyes. Or they may lose their vision for a short time.  With a migraine, you may:    Feel pain or pulsing on one side of the head. For some people, the entire head  hurts.    Be very sensitive to light and sound.    Feel nauseated (sick to your stomach) and vomit (throw up).  How is it treated?  Your care team may suggest medicine to prevent or relieve your symptoms. Once you start having migraines, you may also need medicine to keep them from getting worse.   Take your medicine at the first sign of a migraine. It may take several tries to find a medicine that works for you.   When a migraine comes:    Lie down in a quiet, dark room. Try not to bend over, as this may cause more pain.    Put a cold pack on your head. Try a bag of frozen vegetables, wrapped with a thin cloth.    Drink extra fluids. If you can't drink, suck on ice chips.  How can I prevent migraines?  It will help to keep a migraine diary. By keeping a diary, you may find the cause of your headaches. Once you know the cause, you can take steps to prevent migraines in the future.  It also helps to live a healthy lifestyle. This means:    Get regular exercise. (If exercise triggers your headaches, tell your doctor.)    Drink plenty of water.    Eat healthy meals at regular times.    Try to go to bed and get up and regular times.    Don't smoke. Avoid second-hand smoke.    Avoid caffeine. Coffee, tea and soda may help a migraine. But if you drink them too often, they can cause migraines.    Find ways to relax, have fun and reduce stress in your life.    Try complementary therapies (yoga, acupuncture, massage, biofeedback, etc.).  When should I call my clinic?  Call your clinic at once if you have new or unusual symptoms, such as:     Pain that gets worse or lasts more than 24 hours.    Slurred speech or problems talking.    A weak arm or leg that you can't move normally.    A fever over 100 F (37.8 C), taken under the tongue.    Stiff neck.    Vomiting (throwing up) for several hours.  For more information about migraines  Contact the [employer] [place] for Headache Education (ACHE) at 1-517.794.3117 or  www.headaches.org.  Local providers of complementary therapies  These services may not be covered by insurance. Check your insurance plan.  [facility]  [phone]   Includes pain education, behavioral therapy,   trigger point injections and more.  [facility] [place] [facility]   [phone]   Includes acupuncture, massage, myofascial release.  [facility]  [phone]  [email or web address].[email or web address]  Includes mindfulness, meditation, yoga.  Community Education     [place]: commed.mpls.Community [facility].mn.[facility]: commedprograms.spps.org  Look for programs on yoga, mindfulness, etc.  [facility]   [phone]  www.pathways[place].org  Includes mindfulness, yoga, body-mind skills.  For informational purposes only. Not to replace the advice of your health care provider.   Copyright   2011 [facility]. All rights reserved. [place] 240588 - 11/15.      Your next 10 appointments already scheduled     May 08, 2018  8:45 AM CDT   (Arrive by 8:30 AM)   SHORT with Theo Barnett MD   [facility] [place] ([facility] [place] )    [address] [facility]  [place] [id number] [phone]                 Review of your medicines      Our records show that you are taking the medicines listed below. If these are incorrect, please call your family doctor or clinic.        Dose / Directions Last dose taken    acetaminophen-codeine 300-30 MG per tablet   Commonly known as:  TYLENOL WITH CODEINE #3   Quantity:  60 tablet        1-2 tabs up to twice daily as needed.   Refills:  0        aspirin-acetaminophen-caffeine 250-250-65 MG per tablet   Commonly known as:  EXCEDRIN MIGRAINE   Dose:  1 tablet        Take 1 tablet by mouth every 6 hours as needed for headaches   Refills:  0        clonazePAM 1 MG tablet   Commonly known as:  klonoPIN   Dose:  1 mg        Take 1 mg by mouth   Refills:  0         "IMITREX PO   Dose:  100 mg        Take 100 mg by mouth at onset of headache for migraine May repeat once   Refills:  0        MULTIVITAMIN ADULT PO        Take by mouth daily   Refills:  0        ZANAFLEX PO        Take by mouth every 6 hours as needed for muscle spasms   Refills:  0                Orders Needing Specimen Collection     None      Pending Results     No orders found from 2018 to 2018.            Pending Culture Results     No orders found from 2018 to 2018.            Thank you for choosing [facility]       Thank you for choosing [facility] for your care. Our goal is always to provide you with excellent care. Hearing back from our patients is one way we can continue to improve our services. Please take a few minutes to complete the written survey that you may receive in the mail after you visit with us. Thank you!        [facility] Information     [employer] lets you send messages to your doctor, view your test results, renew your prescriptions, schedule appointments and more. To sign up, go to [email or web address] . Click on \"Log in\" on the left side of the screen, which will take you to the Welcome page. Then click on \"Sign up Now\" on the right side of the page.     You will be asked to enter the access code listed below, as well as some personal information. Please follow the directions to create your username and password.     Your access code is: [email or web address]  Expires: 2018  4:48 PM     Your access code will  in 90 days. If you need help or a new code, please call your [facility] clinic or [phone].        Care EveryWhere ID     This is your Care EveryWhere ID. This could be used by other organizations to access your [facility] medical records  [id number]        Equal Access to Services     [place] : lukas Badillo, erik santiago. So [facility] [phone].    ATENCIÓN: Si isai thompson, " tiene a baltazar disposición servicios gratuitos de asistencia lingüística. Llneeru al [phone].    We comply with applicable federal civil rights laws and [place] laws. We do not discriminate on the basis of race, color, national origin, age, disability, sex, sexual orientation, or gender identity.            After Visit Summary       This is your record. Keep this with you and show to your community pharmacist(s) and doctor(s) at your next visit.

## 2018-05-07 NOTE — ED AVS SNAPSHOT
[facility]    [address] [address]    Phone:  [phone]                                       Venu Amaya   MRN: [id number]    Department[facility]  [facility]   Date of Visit:  5/7/2018           After Visit Summary Signature Page     I have received my discharge instructions, and my questions have been answered. I have discussed any challenges I see with this plan with the nurse or doctor.    ..........................................................................................................................................  Patient/Patient Representative Signature      ..........................................................................................................................................  Patient Representative Print Name and Relationship to Patient    ..................................................               ................................................  Date                                            Time    ..........................................................................................................................................  Reviewed by Signature/Title    ...................................................              ..............................................  Date                                                            Time

## 2018-05-08 ENCOUNTER — OFFICE VISIT (OUTPATIENT)
Dept: OBGYN | Facility: OTHER | Age: 43
End: 2018-05-08
Attending: OBSTETRICS & GYNECOLOGY
Payer: COMMERCIAL

## 2018-05-08 VITALS
HEIGHT: 63 IN | DIASTOLIC BLOOD PRESSURE: 66 MMHG | BODY MASS INDEX: 19.49 KG/M2 | WEIGHT: 110 LBS | OXYGEN SATURATION: 98 % | HEART RATE: 97 BPM | SYSTOLIC BLOOD PRESSURE: 112 MMHG

## 2018-05-08 DIAGNOSIS — G43.821 MENSTRUAL MIGRAINE WITH STATUS MIGRAINOSUS, NOT INTRACTABLE: ICD-10-CM

## 2018-05-08 DIAGNOSIS — N87.9 CERVICAL DYSPLASIA: Primary | ICD-10-CM

## 2018-05-08 DIAGNOSIS — Z12.31 ENCOUNTER FOR SCREENING MAMMOGRAM FOR BREAST CANCER: ICD-10-CM

## 2018-05-08 PROCEDURE — 88142 CYTOPATH C/V THIN LAYER: CPT | Mod: ZL | Performed by: OBSTETRICS & GYNECOLOGY

## 2018-05-08 PROCEDURE — 87624 HPV HI-RISK TYP POOLED RSLT: CPT | Mod: ZL | Performed by: OBSTETRICS & GYNECOLOGY

## 2018-05-08 PROCEDURE — G0463 HOSPITAL OUTPT CLINIC VISIT: HCPCS | Mod: 25

## 2018-05-08 PROCEDURE — 99214 OFFICE O/P EST MOD 30 MIN: CPT | Performed by: OBSTETRICS & GYNECOLOGY

## 2018-05-08 PROCEDURE — 99000 SPECIMEN HANDLING OFFICE-LAB: CPT | Performed by: OBSTETRICS & GYNECOLOGY

## 2018-05-08 PROCEDURE — G0476 HPV COMBO ASSAY CA SCREEN: HCPCS | Mod: ZL | Performed by: OBSTETRICS & GYNECOLOGY

## 2018-05-08 PROCEDURE — G0463 HOSPITAL OUTPT CLINIC VISIT: HCPCS

## 2018-05-08 RX ORDER — ACETAMINOPHEN AND CODEINE PHOSPHATE 120; 12 MG/5ML; MG/5ML
1 SOLUTION ORAL DAILY
Qty: 90 TABLET | Refills: 3 | Status: SHIPPED | OUTPATIENT
Start: 2018-05-08 | End: 2018-05-23

## 2018-05-08 ASSESSMENT — PAIN SCALES - GENERAL: PAINLEVEL: SEVERE PAIN (6)

## 2018-05-08 NOTE — MR AVS SNAPSHOT
After Visit Summary   5/8/2018    Venu Amaya    MRN: [id number]           Patient Information     Date Of Birth          1975        Visit Information        Provider Department      5/8/2018 8:45 AM Theo Barnett MD [facility]        Today's Diagnoses     Cervical dysplasia    -  1    Menstrual migraine with status migrainosus, not intractable        Encounter for screening mammogram for breast cancer          Care Instructions    F/u prn          Follow-ups after your visit        Additional Services     NEUROLOGY ADULT REFERRAL       Your provider has referred you for the following:   Consult in Neurology in [place] for Chronic Migraines    Please be aware that coverage of these services is subject to the terms and limitations of your health insurance plan.  Call member services at your health plan with any benefit or coverage questions.      Please bring the following with you to your appointment:    (1) Any X-Rays, CTs or MRIs which have been performed.  Contact the facility where they were done to arrange for  prior to your scheduled appointment.    (2) List of current medications  (3) This referral request   (4) Any documents/labs given to you for this referral                  Your next 10 appointments already scheduled     May 17, 2018  1:30 PM CDT   (Arrive by 1:15 PM)   MA SCREEN IMPLANTS BILATERAL W/ RUTH with HCMA1   [facility] [place] [facility] ([facility] [place] )    [address]  [place] [address]   [phone]           Do not use any powder, lotion or deodorant under your arms or on your breast. If you do, we will ask you to remove it before your exam.  Wear comfortable, two-piece clothing.  If you have any allergies, tell your care team.  Bring any previous mammograms from other facilities or have them mailed to the breast center.  Three-dimensional (3D) mammograms are available at [place] locations in [facility],  "[place], [place], [facility], [place], [place], and [place]. [facility] locations include [place] and [facility] in [place]. Benefits of 3D mammograms include: - Improved rate of cancer detection - Decreases your chance of having to go back for more tests, which means fewer: - \"False-positive\" results (This means that there is an abnormal area but it isn't cancer.) - Invasive testing procedures, such as a biopsy or surgery - Can provide clearer images of the breast if you have dense breast tissue. 3D mammography is an optional exam that anyone can have with a 2D mammogram. It doesn't replace or take the place of a 2D mammogram. 2D mammograms remain an effective screening test for all women.  Not all insurance companies cover the cost of a 3D mammogram. Check with your insurance.              Who to contact     If you have questions or need follow up information about today's clinic visit or your schedule please contact [facility] directly at [phone].  Normal or non-critical lab and imaging results will be communicated to you by [employer], letter or phone within 4 business days after the clinic has received the results. If you do not hear from us within 7 days, please contact the clinic through [employer] or phone. If you have a critical or abnormal lab result, we will notify you by phone as soon as possible.  Submit refill requests through basno or call your pharmacy and they will forward the refill request to us. Please allow 3 business days for your refill to be completed.          Additional Information About Your Visit        basno Information     basno lets you send messages to your doctor, view your test results, renew your prescriptions, schedule appointments and more. To sign up, go to [email or web address] . Click on \"Log in\" on the left side of the screen, which will take you to the Welcome page. Then click on \"Sign up Now\" on the right side of the page. " "    You will be asked to enter the access code listed below, as well as some personal information. Please follow the directions to create your username and password.     Your access code is: [email or web address]  Expires: 2018  4:48 PM     Your access code will  in 90 days. If you need help or a new code, please call your [facility] or [phone].        Care EveryWhere ID     This is your Care EveryWhere ID. This could be used by other organizations to access your [facility] medical records  [id number]        Your Vitals Were     Pulse Height Last Period Pulse Oximetry BMI (Body Mass Index)       97 5' 3\" (1.6 m) 2018 98% 19.49 kg/m2        Blood Pressure from Last 3 Encounters:   18 112/66   18 115/75   18 104/72    Weight from Last 3 Encounters:   18 110 lb (49.9 kg)   18 110 lb (49.9 kg)   18 110 lb (49.9 kg)              We Performed the Following     A pap thin layer diagnostic with HPV (select HPV order below)     HPV High Risk Types DNA Cervical     NEUROLOGY ADULT REFERRAL          Today's Medication Changes          These changes are accurate as of 18 11:59 PM.  If you have any questions, ask your nurse or doctor.               Start taking these medicines.        Dose/Directions    norethindrone 0.35 MG per tablet   Commonly known as:  MICRONOR   Used for:  Menstrual migraine with status migrainosus, not intractable        Dose:  1 tablet   Take 1 tablet (0.35 mg) by mouth daily   Quantity:  90 tablet   Refills:  3            Where to get your medicines      These medications were sent to [id number] - [place] - [address] AT [facility] [place] [address]  [address], [place] [address]     Phone:  [phone]     norethindrone 0.35 MG per tablet                Primary Care Provider Office Phone # Fax #    Robin Segovia [phone]       [facility] [address]  [place] [address]        Equal Access to " Services     [facility]: Hadii aad ku hadvernamoni Rochellemanoj, waaxda luqadaha, qaybta kaalmada jennaangelaphillip, erik duenas . So [facility] [phone].    ATENCIÓN: Si isai thompson, tiene a baltazar disposición servicios gratuitos de asistencia lingüística. Llame al [phone].    We comply with applicable federal civil rights laws and [place] laws. We do not discriminate on the basis of race, color, national origin, age, disability, sex, sexual orientation, or gender identity.            Thank you!     Thank you for choosing [facility]  for your care. Our goal is always to provide you with excellent care. Hearing back from our patients is one way we can continue to improve our services. Please take a few minutes to complete the written survey that you may receive in the mail after your visit with us. Thank you!             Your Updated Medication List - Protect others around you: Learn how to safely use, store and throw away your medicines at www.disposemymeds.org.          This list is accurate as of 5/8/18 11:59 PM.  Always use your most recent med list.                   Brand Name Dispense Instructions for use Diagnosis    acetaminophen-codeine 300-30 MG per tablet    TYLENOL WITH CODEINE #3    60 tablet    1-2 tabs up to twice daily as needed.    Chronic migraine without aura, with intractable migraine, so stated, without mention of status migrainosus       aspirin-acetaminophen-caffeine 250-250-65 MG per tablet    EXCEDRIN MIGRAINE     Take 1 tablet by mouth every 6 hours as needed for headaches        clonazePAM 1 MG tablet    klonoPIN     Take 1 mg by mouth        IMITREX PO      Take 100 mg by mouth at onset of headache for migraine May repeat once        MULTIVITAMIN ADULT PO      Take by mouth daily        norethindrone 0.35 MG per tablet    MICRONOR    90 tablet    Take 1 tablet (0.35 mg) by mouth daily    Menstrual migraine with status migrainosus, not intractable        ZANAFLEX PO      Take by mouth every 6 hours as needed for muscle spasms

## 2018-05-08 NOTE — NURSING NOTE
"Chief Complaint   Patient presents with     Follow Up For     migraines and possible hormone issue       Initial /66 (BP Location: Left arm, Cuff Size: Adult Regular)  Pulse 97  Ht 5' 3\" (1.6 m)  Wt 110 lb (49.9 kg)  LMP 04/16/2018  SpO2 98%  BMI 19.49 kg/m2 Estimated body mass index is 19.49 kg/(m^2) as calculated from the following:    Height as of this encounter: 5' 3\" (1.6 m).    Weight as of this encounter: 110 lb (49.9 kg).  Medication Reconciliation: complete    Colleen Puentes LPN    "

## 2018-05-08 NOTE — LETTER
Riani,               Your Pap and HPV are both negative. If you have any questions please call [phone].              Theo Barnett MD/ Shadia THOMAS

## 2018-05-11 NOTE — PROGRESS NOTES
S:  F/u cervical dysplasia, migraines (possibly hormonally related).  See my prior evlas.  H/o abn Pap/colposcopy 2011 with subsequent nml Pap 2013, 15, 17.  Present for f/u and to discuss migraines. She has been unsuccessful in managing them with Imitrex through PCM.  They happen 3 weeks out of the month usually starting midcycle and resolving after menses for 1 wk.  Recent stress and spinal fusion surgery.  Periods monthly, light 3d.  Occ spotting midcycle.  +smoker (5/day).  BTO for BC.  No other c/os.               Patient Active Problem List   Diagnosis     Abnormal pap     Anxiety state     Depressive disorder, not elsewhere classified     Migraine     Ankylosing spondylitis (H)     Cervicalgia     Dysthymic disorder     Myalgia and myositis     Chronic low back pain     Opioid dependence (H)     Headache     Long term (current) use of opiate analgesic     Neural foraminal stenosis of cervical spine     S/P cervical spinal fusion            Past Medical History:   Diagnosis Date     Abnormal Pap smear and cervical HPV (human papillo 05/25/2011     Ankylosing spondylitis (H) 05/24/2012    Flori Finch PAC      Anxiety state, unspecified 06/02/2009     Cervicalgia pain (neck) 11/03/2011     Chronic Headache disorder  05/25/2011     Depressive disorder, not elsewhere classified 10/31/2007     Dysmenorrhea 04/03/2012     Habitual aborter, antepartum condition or complica 02/10/2005    Christa Tabares MD      Inguinal hernia without mention of obstruction or gangrene, unilateral or unspecified, (not specified as recurrent) 06/16/2008     Irregular menstrual cycle 05/25/2011     Mental disorders of mother, postpartum 10/06/2005     Migraine, unspecified, without mention of intractable migraine without mention of status migrainosus 01/15/2003     Moderate dysplasia of cervix 11/03/2011     Pain in thoracic spine 11/03/2011     Scoliosis (and kyphoscoliosis), idiopathic 05/16/2011    Abdiel Nolan MD            "  Past Surgical History:   Procedure Laterality Date     inguinal hernia repair, [place] repair  10/14/2008    RT      TUBAL LIGATION              Social History   Substance Use Topics     Smoking status: Former Smoker     Packs/day: 0.00     Types: Cigarettes     Quit date: 7/13/2015     Smokeless tobacco: Never Used      Comment: Passive Exposure: Yes      Alcohol use No            Family History   Problem Relation Age of Onset     CANCER Mother      lymphoma     CANCER Other      CANCER Other      Colon Cancer Sister                Allergies   Allergen Reactions     Amoxicillin Swelling     Hands swell      Levofloxacin Other (See Comments)     Unknown reaction - Levaquin      No Clinical Screening - See Comments      utapar drop in blood pressure     Penicillins      Ritodrine             Current Outpatient Prescriptions   Medication Sig Dispense Refill     acetaminophen-codeine (TYLENOL/CODEINE #3) 300-30 MG per tablet 1-2 tabs up to twice daily as needed. 60 tablet 0     aspirin-acetaminophen-caffeine (EXCEDRIN MIGRAINE) 250-250-65 MG per tablet Take 1 tablet by mouth every 6 hours as needed for headaches       clonazePAM (KLONOPIN) 1 MG tablet Take 1 mg by mouth       norethindrone (MICRONOR) 0.35 MG per tablet Take 1 tablet (0.35 mg) by mouth daily 90 tablet 3     TiZANidine HCl (ZANAFLEX PO) Take by mouth every 6 hours as needed for muscle spasms       Multiple Vitamins-Minerals (MULTIVITAMIN ADULT PO) Take by mouth daily       SUMAtriptan Succinate (IMITREX PO) Take 100 mg by mouth at onset of headache for migraine May repeat once       [DISCONTINUED] PRENATAL VITAMINS PO Take 1 tablet by mouth daily.            Review Of Systems  Constitutional:  Denies fever  GI/ negative except as noted per hpi    O:   /66 (BP Location: Left arm, Cuff Size: Adult Regular)  Pulse 97  Ht 5' 3\" (1.6 m)  Wt 110 lb (49.9 kg)  LMP 04/16/2018  SpO2 98%  BMI 19.49 kg/m2  Gen:  NAD, A and O  Vitals: /66 " "(BP Location: Left arm, Cuff Size: Adult Regular)  Pulse 97  Ht 5' 3\" (1.6 m)  Wt 110 lb (49.9 kg)  LMP 04/16/2018  SpO2 98%  BMI 19.49 kg/m2  BMI= Body mass index is 19.49 kg/(m^2).  Abdomin soft, non tender, non distended  Lymphadenopathy:  negative  Vulva: No lesions, erythema, BUS wnl  Vagina: Pink, moist mucosa with rugae,no lesions  Cervix: No lesions, no CMT  Uterus: Mid position, normal size and count our, mobile, non tender  Adnexa: Non-palpable, non tender, no masses  Anus without lesions  Extremities.  negative           A:P) h/o cervical DPA.  Surveillance pap and HPV/done.  Return to routine screening if nml.  Migraines.  May be aggravated hormonally but are very chronic/longstanding and she is  not a candidate for OCP's due to age/smoking/migraines.  She has not seen a neurologist previously and I believe they have been suboptimally managed and would recommend Neuro referral.  Discussed we could try progesterone only (norethindrone) BCP but they have been shown less effective for menstrual migraines but she is willing to try anything at this point so [facility] Rx done with instructions on use, risks and SE's.  Pt agrees with POC.  25 minutes were spent with the patient with greater than 50% of the visit spent in face-to-face counseling and coordination of care.          "

## 2018-05-14 LAB
COPATH REPORT: NORMAL
PAP: NORMAL

## 2018-05-16 LAB
FINAL DIAGNOSIS: NORMAL
HPV HR 12 DNA CVX QL NAA+PROBE: NEGATIVE
HPV16 DNA SPEC QL NAA+PROBE: NEGATIVE
HPV18 DNA SPEC QL NAA+PROBE: NEGATIVE
SPECIMEN DESCRIPTION: NORMAL
SPECIMEN SOURCE CVX/VAG CYTO: NORMAL

## 2018-05-18 ENCOUNTER — HOSPITAL ENCOUNTER (EMERGENCY)
Facility: HOSPITAL | Age: 43
Discharge: HOME | End: 2018-05-18
Attending: NURSE PRACTITIONER | Admitting: NURSE PRACTITIONER
Payer: COMMERCIAL

## 2018-05-18 VITALS
DIASTOLIC BLOOD PRESSURE: 71 MMHG | HEART RATE: 84 BPM | SYSTOLIC BLOOD PRESSURE: 114 MMHG | TEMPERATURE: 97.8 F | RESPIRATION RATE: 14 BRPM

## 2018-05-18 DIAGNOSIS — G43.009 NONINTRACTABLE MIGRAINE, UNSPECIFIED MIGRAINE TYPE: ICD-10-CM

## 2018-05-18 PROCEDURE — G0463 HOSPITAL OUTPT CLINIC VISIT: HCPCS

## 2018-05-18 PROCEDURE — 25000128 H RX IP 250 OP 636: Performed by: NURSE PRACTITIONER

## 2018-05-18 PROCEDURE — 99213 OFFICE O/P EST LOW 20 MIN: CPT | Performed by: NURSE PRACTITIONER

## 2018-05-18 RX ORDER — SUMATRIPTAN 6 MG/.5ML
6 INJECTION, SOLUTION SUBCUTANEOUS ONCE
Status: COMPLETED | OUTPATIENT
Start: 2018-05-18 | End: 2018-05-18

## 2018-05-18 RX ADMIN — SUMATRIPTAN 6 MG: 6 INJECTION SUBCUTANEOUS at 21:00

## 2018-05-18 NOTE — ED AVS SNAPSHOT
[facility]    [address] [address]    Phone:  [phone]                                       Venu Amaya   MRN: [id number]    Department:  [facility]   Date of Visit:  5/18/2018           Patient Information     Date Of Birth          1975        Your diagnoses for this visit were:     Nonintractable migraine, unspecified migraine type        You were seen by Kay Betts NP.      Follow-up Information     Follow up with [facility].    Specialty:  EMERGENCY MEDICINE    Why:  As needed, If symptoms worsen, or concerns develop    Contact information:    [address] [address] [phone]    Additional information:    From [facility]: Take [address]. Turn left at [address]. Turn left at the first stoplight on [address]. At the first stop sign, take a right onto [address]. Take a left into the parking lot and continue through until you reach the North enterance of the building.       From [place]: Take [address]. Take the [address] ramp towards [place]. Turn left onto [address]. Take a slight right onto [address]. Turn left at the first stoplight on [address]. At the first stop sign, take a right onto [address]. Take a left into the parking lot and continue through until you reach the North enterance of the building.       From [place]: Take [address]. Take a right at [address]. At the first stop sign, take a right onto [address]. Take a left into the parking lot and continue through until you reach the North enterance of the building.         Follow up with Robin Segovia MD.    Specialty:  Family Practice    Why:  As needed, if symptoms do not improve    Contact information:    [facility]  [address] [facility] [id number] [phone]          Discharge Instructions         * Migraine  Headache  Migraine headaches are related to changes in blood flow to the brain. This causes throbbing or constant pain on one or both sides of the head. The pain may last from a few hours to several days. There is usually nausea, vomiting, sensitivity to light and sound, and blurred vision. A migraine attack may be triggered by emotional stress, hormone changes during the menstrual cycle, oral contraceptives, alcohol use, certain foods containing tyramine, eye strain, weather changes, missing meals, or too little or too much sleep.  Home Care For This Headache:  1) If you were given pain medicine for this headache, do not drive yourself home . Arrange for a ride, instead. When you get home, try to sleep. You should feel much better when you wake up.  2) Migraine headaches may improve with an ice pack on the forehead or at the base of the skull. Heat to the back of your neck may relieve any neck spasm.  3) Drink only clear liquids or eat a very light diet to avoid nausea/vomiting until symptoms improve.  Preventing Future Headaches:  1) Pay attention to those factors that seem to trigger your headache. Try to avoid them when you can. If you have frequent headaches, it is useful to keep a diary of what you were doing, feeling or eating in the hours before each attack. Show this to your doctor to help find the cause of your headaches.  a) If you feel that stress is a factor in your headaches, look at the sources of stress in your life. Find ways to release the build-up of those stresses by using regular exercise, relaxation methods (yoga, meditation), bio-feedback or simply taking time-out for yourself. For more information about this, consult your doctor or go to a local bookstore and review books and tapes on this subject.  b) Tyramine is a substance present in the following foods : chocolate, yogurt, all cheeses except cottage cheese and cream cheese. smoked or pickled fish and meat (including herring, caviar,  bologna, pepperoni, salami), liver, avocados, bananas, figs, raisins, and red wine. Be aware that these foods may trigger a migraine in some persons. Try taking these foods out of your diet for 1-2 months to see if this reduces headache frequency.  Treating Future Attacks:  1) At the first sign of a migraine headache, take a medicine to stop it if one has been prescribed for you. If not, take acetaminophen (Tylenol) or ibuprofen (Motrin, Advil) if you are able to take these. The sooner you take medicine, the better it will work.  2) You may also want to find a quiet, dark, comfortable place to sit or lie down. Let yourself relax or sleep.  3) An ice pack on the forehead or area of greatest pain may also help.   Follow Up  with your doctor if the headache is not better within the next 24 hours. If you have frequent headaches you should discuss a treatment plan with your primary care doctor. Ask if you can have medicine to take at home the next time you get a bad headache. Poorly controlled chronic headaches may require a referral to a neurologist (headache specialist).  Get Prompt Medical Attention  if any of the following occur:    Your head pain gets worse, or does not improve within 24 hours    Repeated vomiting (can t keep liquids down)    Sinus or ear or throat pain (not already reported)    Fever of 101  F (38.3  C) or higher, or as directed by your healthcare provider    Stiff neck    Extreme drowsiness, confusion or fainting    Weakness of an arm or leg or one side of the face    Difficulty with speech or vision    4981-8404 [employer]. [address] [place] [address], [place] [address]. All rights reserved. This information is not intended as a substitute for professional medical care. Always follow your healthcare professional's instructions.  This information has been modified by your health care provider with permission from the publisher.          Your next 10 appointments already scheduled     May  "23, 2018  8:30 AM CDT   (Arrive by 8:15 AM)   SHORT with Vani Javed MD   [facility] [place] ([facility] )    [address]  [place] [address]   [phone]           Please have the patient arrive 15 minutes early.            May 23, 2018 10:30 AM CDT   (Arrive by 10:15 AM)   MA SCREEN IMPLANTS BILATERAL W/ RUTH with HCMA1   [facility] ([facility] )    [address]  [place] [address]   [phone]           Do not use any powder, lotion or deodorant under your arms or on your breast. If you do, we will ask you to remove it before your exam.  Wear comfortable, two-piece clothing.  If you have any allergies, tell your care team.  Bring any previous mammograms from other facilities or have them mailed to the breast center.  Three-dimensional (3D) mammograms are available at [facility] locations in [facility], [facility], [place], [place], and [place]. [facility] locations include [place] and [facility] in [place]. Benefits of 3D mammograms include: - Improved rate of cancer detection - Decreases your chance of having to go back for more tests, which means fewer: - \"False-positive\" results (This means that there is an abnormal area but it isn't cancer.) - Invasive testing procedures, such as a biopsy or surgery - Can provide clearer images of the breast if you have dense breast tissue. 3D mammography is an optional exam that anyone can have with a 2D mammogram. It doesn't replace or take the place of a 2D mammogram. 2D mammograms remain an effective screening test for all women.  Not all insurance companies cover the cost of a 3D mammogram. Check with your insurance.                 Review of your medicines      Our records show that you are taking the medicines listed below. If these are incorrect, please call your family doctor or clinic.        Dose / Directions Last dose " "taken    acetaminophen-codeine 300-30 MG per tablet   Commonly known as:  TYLENOL WITH CODEINE #3   Quantity:  60 tablet        1-2 tabs up to twice daily as needed.   Refills:  0        aspirin-acetaminophen-caffeine 250-250-65 MG per tablet   Commonly known as:  EXCEDRIN MIGRAINE   Dose:  1 tablet        Take 1 tablet by mouth every 6 hours as needed for headaches   Refills:  0        clonazePAM 1 MG tablet   Commonly known as:  klonoPIN   Dose:  1 mg        Take 1 mg by mouth   Refills:  0        IMITREX PO   Dose:  100 mg        Take 100 mg by mouth at onset of headache for migraine May repeat once   Refills:  0        MULTIVITAMIN ADULT PO        Take by mouth daily   Refills:  0        norethindrone 0.35 MG per tablet   Commonly known as:  MICRONOR   Dose:  1 tablet   Quantity:  90 tablet        Take 1 tablet (0.35 mg) by mouth daily   Refills:  3        ZANAFLEX PO        Take by mouth every 6 hours as needed for muscle spasms   Refills:  0                Orders Needing Specimen Collection     None      Pending Results     No orders found from 5/16/2018 to 5/19/2018.            Pending Culture Results     No orders found from 5/16/2018 to 5/19/2018.            Thank you for choosing [facility]       Thank you for choosing [facility] for your care. Our goal is always to provide you with excellent care. Hearing back from our patients is one way we can continue to improve our services. Please take a few minutes to complete the written survey that you may receive in the mail after you visit with us. Thank you!        [employer] Information     [employer] lets you send messages to your doctor, view your test results, renew your prescriptions, schedule appointments and more. To sign up, go to [email or web address] . Click on \"Log in\" on the left side of the screen, which will take you to the Welcome page. Then click on \"Sign up Now\" on the right side of the page.     You will be asked to enter the access code listed " below, as well as some personal information. Please follow the directions to create your username and password.     Your access code is: [email or web address]  Expires: 2018  9:27 PM     Your access code will  in 90 days. If you need help or a new code, please call your [facility] clinic or [phone].        Care EveryWhere ID     This is your Care EveryWhere ID. This could be used by other organizations to access your [facility] medical records  [id number]        Equal Access to Services     [facility]ETH : Buddy patricia Somanoj, warose ramsey, caroline kaaljosé antonio ashton, erik duenas . So [facility] [phone].    ATENCIÓN: Si habla español, tiene a baltazar disposición servicios gratuitos de asistencia lingüística. Llame al [phone].    We comply with applicable federal civil rights laws and [place] laws. We do not discriminate on the basis of race, color, national origin, age, disability, sex, sexual orientation, or gender identity.            After Visit Summary       This is your record. Keep this with you and show to your community pharmacist(s) and doctor(s) at your next visit.

## 2018-05-19 ASSESSMENT — ENCOUNTER SYMPTOMS
PHOTOPHOBIA: 1
LIGHT-HEADEDNESS: 0
FACIAL ASYMMETRY: 0
FATIGUE: 0
CHILLS: 0
HEADACHES: 1
COUGH: 0
APPETITE CHANGE: 0
DIZZINESS: 0
FEVER: 0
WEAKNESS: 0

## 2018-05-19 NOTE — ED TRIAGE NOTES
Pt presents today by self with c/o a menstrual migraine. Pt sates she ran out of Imitrex. Her Excedrin isn't helping, and tylenol-3 isn't helping relieve pain.

## 2018-05-19 NOTE — ED PROVIDER NOTES
History     Chief Complaint   Patient presents with     Headache     The history is provided by the patient. No  was used.     Venu Amaya is a 42 year old female who presents today with a CC of migraine.  She has a long history of migraine, she is working with neurology and PCP with this.  She is also trying some hormones to help as they seems to associated with menses.  She took excedrin and tylenol #3 without much improvement.  She took her last imitrex at home a few days ago.      Problem List:    Patient Active Problem List    Diagnosis Date Noted     S/P cervical spinal fusion 03/29/2018     Priority: Medium     Neural foraminal stenosis of cervical spine 10/05/2017     Priority: Medium     Opioid dependence (H) 12/23/2015     Priority: Medium     Long term (current) use of opiate analgesic 12/23/2015     Priority: Medium     Chronic low back pain 11/02/2015     Priority: Medium     Overview:   IMO Update       Headache 10/23/2014     Priority: Medium     Overview:   10/23/14: According to neurology consultation of 9/2/14, previous treatments  include pretty much all of the triptans, Maxalt, Imitrex, Amerge, Axert, Frova, and Zomig. She reports that she has tried these when she did not have a constant headache and they did not work when she took them at first onset nor do they work now. She recently tried Imitrex injections and has tried a nasal spray in the past without improvement. The patient does take Compazine for the nausea and reports that that helps somewhat. She has tried numerous preventative medications as well including Topamax, Depakote, gabapentin, Tegretol, Dilantin, nortriptyline, propranolol, and verapamil per her report. She has tried physical therapy as well as trigger injections and denies that any of these things are helpful. She does report she takes ibuprofen 800 mg twice per day for ankylosing spondylitis. For her headaches, the only thing she has found that  helped is Tylenol with Codeine.        Cervicalgia 05/15/2014     Priority: Medium     Myalgia and myositis 05/15/2014     Priority: Medium     Abnormal pap 07/30/2013     Priority: Medium     Ankylosing spondylitis (H) 05/24/2012     Priority: Medium     Flori Finch PAC        Dysthymic disorder 09/13/2010     Priority: Medium     Anxiety state 06/02/2009     Priority: Medium     Problem list name updated by automated process. Provider to review       Depressive disorder, not elsewhere classified 10/31/2007     Priority: Medium     Migraine 01/15/2003     Priority: Medium     Problem list name updated by automated process. Provider to review          Past Medical History:    Past Medical History:   Diagnosis Date     Abnormal Pap smear and cervical HPV (human papillo 05/25/2011     Ankylosing spondylitis (H) 05/24/2012     Anxiety state, unspecified 06/02/2009     Cervicalgia pain (neck) 11/03/2011     Chronic Headache disorder  05/25/2011     Depressive disorder, not elsewhere classified 10/31/2007     Dysmenorrhea 04/03/2012     Habitual aborter, antepartum condition or complica 02/10/2005     Inguinal hernia without mention of obstruction or gangrene, unilateral or unspecified, (not specified as recurrent) 06/16/2008     Irregular menstrual cycle 05/25/2011     Mental disorders of mother, postpartum 10/06/2005     Migraine, unspecified, without mention of intractable migraine without mention of status migrainosus 01/15/2003     Moderate dysplasia of cervix 11/03/2011     Pain in thoracic spine 11/03/2011     Scoliosis (and kyphoscoliosis), idiopathic 05/16/2011       Past Surgical History:    Past Surgical History:   Procedure Laterality Date     inguinal hernia repair, Germain repair  10/14/2008    RT      TUBAL LIGATION         Family History:    Family History   Problem Relation Age of Onset     CANCER Mother      lymphoma     CANCER Other      CANCER Other      Colon Cancer Sister        Social  History:  Marital Status:   [2]  Social History   Substance Use Topics     Smoking status: Former Smoker     Packs/day: 0.00     Types: Cigarettes     Quit date: 7/13/2015     Smokeless tobacco: Never Used      Comment: Passive Exposure: Yes      Alcohol use No        Medications:      acetaminophen-codeine (TYLENOL/CODEINE #3) 300-30 MG per tablet   aspirin-acetaminophen-caffeine (EXCEDRIN MIGRAINE) 250-250-65 MG per tablet   clonazePAM (KLONOPIN) 1 MG tablet   Multiple Vitamins-Minerals (MULTIVITAMIN ADULT PO)   norethindrone (MICRONOR) 0.35 MG per tablet   SUMAtriptan Succinate (IMITREX PO)   TiZANidine HCl (ZANAFLEX PO)   [DISCONTINUED] PRENATAL VITAMINS PO         Review of Systems   Constitutional: Negative for appetite change, chills, fatigue and fever.   HENT: Negative.    Eyes: Positive for photophobia.   Respiratory: Negative for cough.    Neurological: Positive for headaches. Negative for dizziness, facial asymmetry, weakness and light-headedness.       Physical Exam   BP: 114/71  Pulse: 84  Temp: 97.8  F (36.6  C)  Resp: 14      Physical Exam   Constitutional: She is oriented to person, place, and time. She appears well-developed and well-nourished. She is cooperative. She does not appear ill.   HENT:   Head: Normocephalic and atraumatic.   Right Ear: Tympanic membrane, external ear and ear canal normal.   Left Ear: Tympanic membrane, external ear and ear canal normal.   Nose: Right sinus exhibits no maxillary sinus tenderness and no frontal sinus tenderness. Left sinus exhibits no maxillary sinus tenderness and no frontal sinus tenderness.   Mouth/Throat: Uvula is midline and oropharynx is clear and moist.   Eyes: Conjunctivae and EOM are normal. Pupils are equal, round, and reactive to light.   Neck: Normal range of motion. Neck supple.   Cardiovascular: Normal rate and regular rhythm.    Pulmonary/Chest: Effort normal and breath sounds normal.   Neurological: She is alert and oriented to  person, place, and time.   Skin: Skin is warm and dry.   Psychiatric: Her behavior is normal. Her affect is blunt.   Nursing note and vitals reviewed.      ED Course     ED Course     Procedures    Medications   SUMAtriptan (IMITREX) injection 6 mg (6 mg Subcutaneous Given 5/18/18 2100)     Observed for a minimum of 20 minutes, tolerated medications well, no adverse efects noted, reports pain is resolved on discharge.    Assessments & Plan (with Medical Decision Making)     I have reviewed the nursing notes.    I have reviewed the findings, diagnosis, plan and need for follow up with the patient.  ASSESSMENT / PLAN:  (G43.009) Nonintractable migraine, unspecified migraine type  Comment: symptomatic, chronic, no acute changes, not worst headache of life  Plan:  Continue to follow up with Dr Segovia and neuro   Return to ED/UC as needed for increase in symptoms, new concerns        Discharge Medication List as of 5/18/2018  9:27 PM          Final diagnoses:   Nonintractable migraine, unspecified migraine type       5/18/2018   [facility]     Kay Betts NP  05/19/18 1409

## 2018-05-19 NOTE — DISCHARGE INSTRUCTIONS
* Migraine Headache  Migraine headaches are related to changes in blood flow to the brain. This causes throbbing or constant pain on one or both sides of the head. The pain may last from a few hours to several days. There is usually nausea, vomiting, sensitivity to light and sound, and blurred vision. A migraine attack may be triggered by emotional stress, hormone changes during the menstrual cycle, oral contraceptives, alcohol use, certain foods containing tyramine, eye strain, weather changes, missing meals, or too little or too much sleep.  Home Care For This Headache:  1) If you were given pain medicine for this headache, do not drive yourself home . Arrange for a ride, instead. When you get home, try to sleep. You should feel much better when you wake up.  2) Migraine headaches may improve with an ice pack on the forehead or at the base of the skull. Heat to the back of your neck may relieve any neck spasm.  3) Drink only clear liquids or eat a very light diet to avoid nausea/vomiting until symptoms improve.  Preventing Future Headaches:  1) Pay attention to those factors that seem to trigger your headache. Try to avoid them when you can. If you have frequent headaches, it is useful to keep a diary of what you were doing, feeling or eating in the hours before each attack. Show this to your doctor to help find the cause of your headaches.  a) If you feel that stress is a factor in your headaches, look at the sources of stress in your life. Find ways to release the build-up of those stresses by using regular exercise, relaxation methods (yoga, meditation), bio-feedback or simply taking time-out for yourself. For more information about this, consult your doctor or go to a local bookstore and review books and tapes on this subject.  b) Tyramine is a substance present in the following foods : chocolate, yogurt, all cheeses except cottage cheese and cream cheese. smoked or pickled fish and meat (including herring,  caviar, bologna, pepperoni, salami), liver, avocados, bananas, figs, raisins, and red wine. Be aware that these foods may trigger a migraine in some persons. Try taking these foods out of your diet for 1-2 months to see if this reduces headache frequency.  Treating Future Attacks:  1) At the first sign of a migraine headache, take a medicine to stop it if one has been prescribed for you. If not, take acetaminophen (Tylenol) or ibuprofen (Motrin, Advil) if you are able to take these. The sooner you take medicine, the better it will work.  2) You may also want to find a quiet, dark, comfortable place to sit or lie down. Let yourself relax or sleep.  3) An ice pack on the forehead or area of greatest pain may also help.   Follow Up  with your doctor if the headache is not better within the next 24 hours. If you have frequent headaches you should discuss a treatment plan with your primary care doctor. Ask if you can have medicine to take at home the next time you get a bad headache. Poorly controlled chronic headaches may require a referral to a neurologist (headache specialist).  Get Prompt Medical Attention  if any of the following occur:    Your head pain gets worse, or does not improve within 24 hours    Repeated vomiting (can t keep liquids down)    Sinus or ear or throat pain (not already reported)    Fever of 101  F (38.3  C) or higher, or as directed by your healthcare provider    Stiff neck    Extreme drowsiness, confusion or fainting    Weakness of an arm or leg or one side of the face    Difficulty with speech or vision    7194-5200 [employer]. [address] [place] [address], [place] [address]. All rights reserved. This information is not intended as a substitute for professional medical care. Always follow your healthcare professional's instructions.  This information has been modified by your health care provider with permission from the publisher.

## 2018-05-20 ENCOUNTER — HOSPITAL ENCOUNTER (EMERGENCY)
Facility: HOSPITAL | Age: 43
Discharge: HOME | End: 2018-05-20
Attending: NURSE PRACTITIONER | Admitting: NURSE PRACTITIONER
Payer: COMMERCIAL

## 2018-05-20 VITALS
TEMPERATURE: 98.2 F | RESPIRATION RATE: 14 BRPM | OXYGEN SATURATION: 98 % | DIASTOLIC BLOOD PRESSURE: 74 MMHG | SYSTOLIC BLOOD PRESSURE: 108 MMHG

## 2018-05-20 DIAGNOSIS — G43.009 NONINTRACTABLE MIGRAINE, UNSPECIFIED MIGRAINE TYPE: ICD-10-CM

## 2018-05-20 PROCEDURE — 25000128 H RX IP 250 OP 636: Performed by: NURSE PRACTITIONER

## 2018-05-20 PROCEDURE — G0463 HOSPITAL OUTPT CLINIC VISIT: HCPCS

## 2018-05-20 PROCEDURE — 99213 OFFICE O/P EST LOW 20 MIN: CPT | Performed by: NURSE PRACTITIONER

## 2018-05-20 RX ORDER — SUMATRIPTAN 6 MG/.5ML
6 INJECTION, SOLUTION SUBCUTANEOUS ONCE
Status: COMPLETED | OUTPATIENT
Start: 2018-05-20 | End: 2018-05-20

## 2018-05-20 RX ADMIN — SUMATRIPTAN 6 MG: 6 INJECTION SUBCUTANEOUS at 12:23

## 2018-05-20 NOTE — ED PROVIDER NOTES
History     Chief Complaint   Patient presents with     Headache     c/o migraine h/a     The history is provided by the patient. No  was used.     Venu Amaya is a 42 year old female who presents with a migraine that started 2 hours PTA. She frequents this facility often for migraine management. Last visit was 2 days ago. This feels like her typical migraine. She feels her migraines are hormone related. She uses Excedrin and Tylenol #3 for migraines. She has Imitrex and uses as needed. She is follow by neurology and her PCP. She is requesting an injection of Imitrex.       Problem List:    Patient Active Problem List    Diagnosis Date Noted     S/P cervical spinal fusion 03/29/2018     Priority: Medium     Neural foraminal stenosis of cervical spine 10/05/2017     Priority: Medium     Opioid dependence (H) 12/23/2015     Priority: Medium     Long term (current) use of opiate analgesic 12/23/2015     Priority: Medium     Chronic low back pain 11/02/2015     Priority: Medium     Overview:   IMO Update       Headache 10/23/2014     Priority: Medium     Overview:   10/23/14: According to neurology consultation of 9/2/14, previous treatments  include pretty much all of the triptans, Maxalt, Imitrex, Amerge, Axert, Frova, and Zomig. She reports that she has tried these when she did not have a constant headache and they did not work when she took them at first onset nor do they work now. She recently tried Imitrex injections and has tried a nasal spray in the past without improvement. The patient does take Compazine for the nausea and reports that that helps somewhat. She has tried numerous preventative medications as well including Topamax, Depakote, gabapentin, Tegretol, Dilantin, nortriptyline, propranolol, and verapamil per her report. She has tried physical therapy as well as trigger injections and denies that any of these things are helpful. She does report she takes ibuprofen 800 mg  twice per day for ankylosing spondylitis. For her headaches, the only thing she has found that helped is Tylenol with Codeine.        Cervicalgia 05/15/2014     Priority: Medium     Myalgia and myositis 05/15/2014     Priority: Medium     Abnormal pap 07/30/2013     Priority: Medium     Ankylosing spondylitis (H) 05/24/2012     Priority: Medium     Flori AN        Dysthymic disorder 09/13/2010     Priority: Medium     Anxiety state 06/02/2009     Priority: Medium     Problem list name updated by automated process. Provider to review       Depressive disorder, not elsewhere classified 10/31/2007     Priority: Medium     Migraine 01/15/2003     Priority: Medium     Problem list name updated by automated process. Provider to review          Past Medical History:    Past Medical History:   Diagnosis Date     Abnormal Pap smear and cervical HPV (human papillo 05/25/2011     Ankylosing spondylitis (H) 05/24/2012     Anxiety state, unspecified 06/02/2009     Cervicalgia pain (neck) 11/03/2011     Chronic Headache disorder  05/25/2011     Depressive disorder, not elsewhere classified 10/31/2007     Dysmenorrhea 04/03/2012     Habitual aborter, antepartum condition or complica 02/10/2005     Inguinal hernia without mention of obstruction or gangrene, unilateral or unspecified, (not specified as recurrent) 06/16/2008     Irregular menstrual cycle 05/25/2011     Mental disorders of mother, postpartum 10/06/2005     Migraine, unspecified, without mention of intractable migraine without mention of status migrainosus 01/15/2003     Moderate dysplasia of cervix 11/03/2011     Pain in thoracic spine 11/03/2011     Scoliosis (and kyphoscoliosis), idiopathic 05/16/2011       Past Surgical History:    Past Surgical History:   Procedure Laterality Date     inguinal hernia repair, Germain repair  10/14/2008    RT      TUBAL LIGATION         Family History:    Family History   Problem Relation Age of Onset     CANCER  Mother      lymphoma     CANCER Other      CANCER Other      Colon Cancer Sister        Social History:  Marital Status:   [2]  Social History   Substance Use Topics     Smoking status: Former Smoker     Packs/day: 0.00     Types: Cigarettes     Quit date: 7/13/2015     Smokeless tobacco: Never Used      Comment: Passive Exposure: Yes      Alcohol use No        Medications:      acetaminophen-codeine (TYLENOL/CODEINE #3) 300-30 MG per tablet   aspirin-acetaminophen-caffeine (EXCEDRIN MIGRAINE) 250-250-65 MG per tablet   clonazePAM (KLONOPIN) 1 MG tablet   Multiple Vitamins-Minerals (MULTIVITAMIN ADULT PO)   norethindrone (MICRONOR) 0.35 MG per tablet   SUMAtriptan Succinate (IMITREX PO)   TiZANidine HCl (ZANAFLEX PO)   [DISCONTINUED] PRENATAL VITAMINS PO         Review of Systems   Constitutional: Negative for activity change, appetite change and fever.   HENT: Negative for trouble swallowing.    Eyes: Positive for photophobia.   Respiratory: Negative for cough.    Gastrointestinal: Negative for vomiting.   Skin: Negative for rash.   Neurological: Positive for headaches. Negative for weakness.   Psychiatric/Behavioral: Negative.        Physical Exam   BP: 108/74  Heart Rate: 83  Temp: 98.2  F (36.8  C)  Resp: 14  SpO2: 98 %      Physical Exam   Constitutional: She is oriented to person, place, and time. She appears well-developed and well-nourished. She appears distressed.   HENT:   Head: Normocephalic.   Mouth/Throat: Oropharynx is clear and moist.   Neck: Normal range of motion.   Cardiovascular: Normal rate and normal heart sounds.    Pulmonary/Chest: Effort normal. No respiratory distress.   Musculoskeletal: Normal range of motion.   Neurological: She is alert and oriented to person, place, and time. She exhibits normal muscle tone.   Skin: Skin is warm and dry. No rash noted. She is not diaphoretic.   Psychiatric: She has a normal mood and affect. Her behavior is normal.   Nursing note and vitals  "reviewed.      ED Course     ED Course     Procedures    Medications   SUMAtriptan (IMITREX) injection 6 mg (6 mg Subcutaneous Given 5/20/18 1223)       Assessments & Plan (with Medical Decision Making)     Venu feels this is a typical migraine with same characteristics that she always has. She requested a shot of Imitrex. After Imitrex was administered, I went to exam room to complete exam and she was gone. I called her on the phone and asked her to come back. \"I left because my daughter crashed her bike.\" \"I'm feeling a lot better.\" She did verbalize that she would come back for completion of visit.     I have reviewed the nursing notes.    Discharge Medication List as of 5/20/2018  2:15 PM          Final diagnoses:   Nonintractable migraine, unspecified migraine type     Left without completion of visit after Imitrex administration. No discharge instructions.     ALFONSO Greene  5/20/2018  12:55 PM  [facility]       Irene Gresham NP  05/21/18 0656       Irene Gresham NP  05/21/18 0657    "

## 2018-05-21 ASSESSMENT — ENCOUNTER SYMPTOMS
ACTIVITY CHANGE: 0
FEVER: 0
COUGH: 0
HEADACHES: 1
APPETITE CHANGE: 0
VOMITING: 0
PHOTOPHOBIA: 1
WEAKNESS: 0
PSYCHIATRIC NEGATIVE: 1
TROUBLE SWALLOWING: 0

## 2018-05-21 NOTE — ED PROVIDER NOTES
At the end of my shift, she had not returned to facility for completion of visit.       Irene Gresham, XOCHITL  05/21/18 0701

## 2018-05-23 ENCOUNTER — OFFICE VISIT (OUTPATIENT)
Dept: FAMILY MEDICINE | Facility: OTHER | Age: 43
End: 2018-05-23
Attending: FAMILY MEDICINE
Payer: COMMERCIAL

## 2018-05-23 VITALS
RESPIRATION RATE: 16 BRPM | DIASTOLIC BLOOD PRESSURE: 68 MMHG | OXYGEN SATURATION: 98 % | WEIGHT: 111 LBS | TEMPERATURE: 98 F | HEART RATE: 94 BPM | HEIGHT: 63 IN | BODY MASS INDEX: 19.67 KG/M2 | SYSTOLIC BLOOD PRESSURE: 110 MMHG

## 2018-05-23 DIAGNOSIS — F41.9 ANXIETY: ICD-10-CM

## 2018-05-23 DIAGNOSIS — Z71.6 ENCOUNTER FOR TOBACCO USE CESSATION COUNSELING: ICD-10-CM

## 2018-05-23 DIAGNOSIS — G89.29 CHRONIC NONINTRACTABLE HEADACHE, UNSPECIFIED HEADACHE TYPE: Primary | ICD-10-CM

## 2018-05-23 DIAGNOSIS — R51.9 CHRONIC NONINTRACTABLE HEADACHE, UNSPECIFIED HEADACHE TYPE: Primary | ICD-10-CM

## 2018-05-23 DIAGNOSIS — F17.200 TOBACCO USE DISORDER: ICD-10-CM

## 2018-05-23 PROCEDURE — 99213 OFFICE O/P EST LOW 20 MIN: CPT | Performed by: FAMILY MEDICINE

## 2018-05-23 PROCEDURE — G0463 HOSPITAL OUTPT CLINIC VISIT: HCPCS

## 2018-05-23 ASSESSMENT — ANXIETY QUESTIONNAIRES
2. NOT BEING ABLE TO STOP OR CONTROL WORRYING: SEVERAL DAYS
IF YOU CHECKED OFF ANY PROBLEMS ON THIS QUESTIONNAIRE, HOW DIFFICULT HAVE THESE PROBLEMS MADE IT FOR YOU TO DO YOUR WORK, TAKE CARE OF THINGS AT HOME, OR GET ALONG WITH OTHER PEOPLE: SOMEWHAT DIFFICULT
6. BECOMING EASILY ANNOYED OR IRRITABLE: SEVERAL DAYS
3. WORRYING TOO MUCH ABOUT DIFFERENT THINGS: SEVERAL DAYS
5. BEING SO RESTLESS THAT IT IS HARD TO SIT STILL: NOT AT ALL
GAD7 TOTAL SCORE: 7
7. FEELING AFRAID AS IF SOMETHING AWFUL MIGHT HAPPEN: NOT AT ALL
1. FEELING NERVOUS, ANXIOUS, OR ON EDGE: NEARLY EVERY DAY

## 2018-05-23 ASSESSMENT — PAIN SCALES - GENERAL: PAINLEVEL: MODERATE PAIN (5)

## 2018-05-23 ASSESSMENT — PATIENT HEALTH QUESTIONNAIRE - PHQ9: 5. POOR APPETITE OR OVEREATING: SEVERAL DAYS

## 2018-05-23 NOTE — NURSING NOTE
"Chief Complaint   Patient presents with     Headache       Initial /68 (BP Location: Left arm, Patient Position: Sitting, Cuff Size: Adult Regular)  Pulse 94  Temp 98  F (36.7  C) (Tympanic)  Resp 16  Ht 5' 3\" (1.6 m)  Wt 111 lb (50.3 kg)  SpO2 98%  BMI 19.66 kg/m2 Estimated body mass index is 19.66 kg/(m^2) as calculated from the following:    Height as of this encounter: 5' 3\" (1.6 m).    Weight as of this encounter: 111 lb (50.3 kg).  Medication Reconciliation: complete    Daisy Morgan MA  "

## 2018-05-23 NOTE — MR AVS SNAPSHOT
"              After Visit Summary   5/23/2018    Venu Amaya    MRN: [id number]           Patient Information     Date Of Birth          1975        Visit Information        Provider Department      5/23/2018 8:30 AM Vani Javed MD [facility]        Today's Diagnoses     Chronic nonintractable headache, unspecified headache type    -  1    Anxiety          Care Instructions    Follow up with Dr. Segovia.          Follow-ups after your visit        Your next 10 appointments already scheduled     May 23, 2018 10:30 AM CDT   (Arrive by 10:15 AM)   MA SCREEN IMPLANTS BILATERAL W/ RUTH with HCMA1   [facility] ([facility] [place] )    [address]  [place] [address]   [phone]           Do not use any powder, lotion or deodorant under your arms or on your breast. If you do, we will ask you to remove it before your exam.  Wear comfortable, two-piece clothing.  If you have any allergies, tell your care team.  Bring any previous mammograms from other facilities or have them mailed to the breast center.  Three-dimensional (3D) mammograms are available at [facility] locations in [facility], [facility], [facility], [place], [place], and [place]. [facility] locations include [place] and [facility] in [place]. Benefits of 3D mammograms include: - Improved rate of cancer detection - Decreases your chance of having to go back for more tests, which means fewer: - \"False-positive\" results (This means that there is an abnormal area but it isn't cancer.) - Invasive testing procedures, such as a biopsy or surgery - Can provide clearer images of the breast if you have dense breast tissue. 3D mammography is an optional exam that anyone can have with a 2D mammogram. It doesn't replace or take the place of a 2D mammogram. 2D mammograms remain an effective screening test for all women.  Not all insurance companies cover the " "cost of a 3D mammogram. Check with your insurance.              Who to contact     If you have questions or need follow up information about today's clinic visit or your schedule please contact [facility] directly at [phone].  Normal or non-critical lab and imaging results will be communicated to you by MyChart, letter or phone within 4 business days after the clinic has received the results. If you do not hear from us within 7 days, please contact the clinic through MyChart or phone. If you have a critical or abnormal lab result, we will notify you by phone as soon as possible.  Submit refill requests through [employer] or call your pharmacy and they will forward the refill request to us. Please allow 3 business days for your refill to be completed.          Additional Information About Your Visit        [employer] Information     [employer] lets you send messages to your doctor, view your test results, renew your prescriptions, schedule appointments and more. To sign up, go to [email or web address] . Click on \"Log in\" on the left side of the screen, which will take you to the Welcome page. Then click on \"Sign up Now\" on the right side of the page.     You will be asked to enter the access code listed below, as well as some personal information. Please follow the directions to create your username and password.     Your access code is: [email or web address]  Expires: 2018  9:27 PM     Your access code will  in 90 days. If you need help or a new code, please call your [facility] or [phone].        Care EveryWhere ID     This is your Care EveryWhere ID. This could be used by other organizations to access your [facility] medical records  [id number]        Your Vitals Were     Pulse Temperature Respirations Height Pulse Oximetry BMI (Body Mass Index)    94 98  F (36.7  C) (Tympanic) 16 5' 3\" (1.6 m) 98% 19.66 kg/m2       Blood Pressure from Last 3 Encounters:   18 110/68   18 108/74 "   05/18/18 114/71    Weight from Last 3 Encounters:   05/23/18 111 lb (50.3 kg)   05/08/18 110 lb (49.9 kg)   05/07/18 110 lb (49.9 kg)              Today, you had the following     No orders found for display       Primary Care Provider Office Phone # Fax #    Robin Segovia [phone]       [facility] [address] [facility] [address]        Equal Access to Services     KING TREVINO : Hadii aad ku hadasho Soomaali, waaxda luqadaha, qaybta kaalmada adeegyada, waxay idiin hayaan adeeg kharash la'chrisn . So [facility] [phone].    ATENCIÓN: Si habla español, tiene a baltazar disposición servicios gratuitos de asistencia lingüística. [facility] [phone].    We comply with applicable federal civil rights laws and [place] laws. We do not discriminate on the basis of race, color, national origin, age, disability, sex, sexual orientation, or gender identity.            Thank you!     Thank you for choosing [facility]  for your care. Our goal is always to provide you with excellent care. Hearing back from our patients is one way we can continue to improve our services. Please take a few minutes to complete the written survey that you may receive in the mail after your visit with us. Thank you!             Your Updated Medication List - Protect others around you: Learn how to safely use, store and throw away your medicines at www.disposemymeds.org.          This list is accurate as of 5/23/18  9:05 AM.  Always use your most recent med list.                   Brand Name Dispense Instructions for use Diagnosis    acetaminophen-codeine 300-30 MG per tablet    TYLENOL WITH CODEINE #3    60 tablet    1-2 tabs up to twice daily as needed.    Chronic migraine without aura, with intractable migraine, so stated, without mention of status migrainosus       aspirin-acetaminophen-caffeine 250-250-65 MG per tablet    EXCEDRIN MIGRAINE     Take 1 tablet by mouth every 6 hours as needed for headaches         clonazePAM 1 MG tablet    klonoPIN     Take 1 mg by mouth        IMITREX PO      Take 100 mg by mouth at onset of headache for migraine May repeat once        MULTIVITAMIN ADULT PO      Take by mouth daily        ZANAFLEX PO      Take by mouth every 6 hours as needed for muscle spasms

## 2018-05-24 ASSESSMENT — ANXIETY QUESTIONNAIRES: GAD7 TOTAL SCORE: 7

## 2018-05-24 ASSESSMENT — PATIENT HEALTH QUESTIONNAIRE - PHQ9: SUM OF ALL RESPONSES TO PHQ QUESTIONS 1-9: 10

## 2018-06-14 ENCOUNTER — HOSPITAL ENCOUNTER (EMERGENCY)
Facility: HOSPITAL | Age: 43
Discharge: HOME | End: 2018-06-14
Attending: INTERNAL MEDICINE | Admitting: INTERNAL MEDICINE
Payer: COMMERCIAL

## 2018-06-14 VITALS
SYSTOLIC BLOOD PRESSURE: 103 MMHG | RESPIRATION RATE: 16 BRPM | TEMPERATURE: 97.6 F | OXYGEN SATURATION: 99 % | DIASTOLIC BLOOD PRESSURE: 61 MMHG | HEART RATE: 78 BPM

## 2018-06-14 DIAGNOSIS — G43.909 MIGRAINE WITHOUT STATUS MIGRAINOSUS, NOT INTRACTABLE, UNSPECIFIED MIGRAINE TYPE: ICD-10-CM

## 2018-06-14 PROCEDURE — 99284 EMERGENCY DEPT VISIT MOD MDM: CPT | Performed by: INTERNAL MEDICINE

## 2018-06-14 PROCEDURE — 25000128 H RX IP 250 OP 636: Performed by: INTERNAL MEDICINE

## 2018-06-14 PROCEDURE — 99283 EMERGENCY DEPT VISIT LOW MDM: CPT

## 2018-06-14 RX ORDER — SUMATRIPTAN 6 MG/.5ML
6 INJECTION, SOLUTION SUBCUTANEOUS ONCE
Status: COMPLETED | OUTPATIENT
Start: 2018-06-14 | End: 2018-06-14

## 2018-06-14 RX ADMIN — SUMATRIPTAN 6 MG: 6 INJECTION SUBCUTANEOUS at 22:44

## 2018-06-14 ASSESSMENT — ENCOUNTER SYMPTOMS
SHORTNESS OF BREATH: 0
EYE PAIN: 0
HEADACHES: 1
EYE ITCHING: 0
ABDOMINAL PAIN: 0
EYE DISCHARGE: 0
EYE REDNESS: 0
FEVER: 0
PHOTOPHOBIA: 1

## 2018-06-14 NOTE — ED AVS SNAPSHOT
[facility]    [address] [address]    Phone:  [phone]                                       Venu Amaya   MRN: [id number]    Department:  [facility]   Date of Visit:  6/14/2018           After Visit Summary Signature Page     I have received my discharge instructions, and my questions have been answered. I have discussed any challenges I see with this plan with the nurse or doctor.    ..........................................................................................................................................  Patient/Patient Representative Signature      ..........................................................................................................................................  Patient Representative Print Name and Relationship to Patient    ..................................................               ................................................  Date                                            Time    ..........................................................................................................................................  Reviewed by Signature/Title    ...................................................              ..............................................  Date                                                            Time

## 2018-06-14 NOTE — ED AVS SNAPSHOT
[facility]    [address] [address]    Phone:  [phone]                                       Venu Amaya   MRN: [id number]    Department:  [facility]   Date of Visit:  6/14/2018           Patient Information     Date Of Birth          1975        Your diagnoses for this visit were:     Migraine without status migrainosus, not intractable, unspecified migraine type        You were seen by Abdoulaye Garcia MD.      Follow-up Information     Follow up with Robin Segovia MD.    Specialty:  Family Practice    Contact information:    [facility]  [address] [facility] [address]  [phone]          Discharge Instructions         Migraines and Cluster Headaches  Migraines and cluster headaches cause intense, throbbing pain on one side of the head. With a migraine, you may have nausea and vomiting and be sensitive to light and sound. You may also have warning signs, such as flashing lights or loss of parts of your vision, before the pain starts. This is called an aura. Migraines are 3 times more common in women than men. This may be due to hormonal changes during menstruation. Typical migraines may last for 4 to 72 hours untreated.  Cluster headaches recur in groups for days, weeks, or months. The pain is centered around or behind one eye. The eye may also become red or teary, or the eyelid may droop. Migraines and cluster headaches can have many triggers.    Preventing migraines and cluster headaches  Try the following steps:    Don't eat aged cheeses, nuts, beans, chocolate, red wine, or foods that contain caffeine, alcohol, tobacco, nitrates, and MSG.    Try not to skip meals.    Don t work in poor lighting.    Reduce stress as much as you can.    Get plenty of sleep each night.    Exercise regularly under your doctor s guidance.    Don't take headache medicines for more than 3 days, because of the risk of rebound headaches.    Don't take certain  prescription medicines that are known to cause rebound headaches.  Relieving the pain  Try these suggestions:    Stay quiet and rest.    Use cold to numb the pain. Wrap ice or a cold can of soda in a cloth. Hold it against the site of pain for 10 minutes. Repeat every 20 minutes.    Stay out of the light. Wear dark glasses, turn out lights, and close the curtains. When outdoors, wear a brimmed hat.    Drink lots of fluids. Sip caffeine-free flat soda to help relieve nausea.    See your doctor if you get migraines or cluster headaches often. There are effective medicines to help treat or prevent them.    Hormone therapy. This may help women whose migraines are related to hormonal changes during menstruation.  Date Last Reviewed: 12/1/2017 2000-2017 [employer]. [address] [place] [address], [place] [id number]. All rights reserved. This information is not intended as a substitute for professional medical care. Always follow your healthcare professional's instructions.             Review of your medicines      Our records show that you are taking the medicines listed below. If these are incorrect, please call your family doctor or clinic.        Dose / Directions Last dose taken    acetaminophen-codeine 300-30 MG per tablet   Commonly known as:  TYLENOL WITH CODEINE #3   Quantity:  60 tablet        1-2 tabs up to twice daily as needed.   Refills:  0        aspirin-acetaminophen-caffeine 250-250-65 MG per tablet   Commonly known as:  EXCEDRIN MIGRAINE   Dose:  1 tablet        Take 1 tablet by mouth every 6 hours as needed for headaches   Refills:  0        clonazePAM 1 MG tablet   Commonly known as:  klonoPIN   Dose:  1 mg        Take 1 mg by mouth   Refills:  0        IBUPROFEN PO   Dose:  800 mg        Take 800 mg by mouth   Refills:  0        IMITREX PO   Dose:  100 mg        Take 100 mg by mouth at onset of headache for migraine May repeat once   Refills:  0        MULTIVITAMIN ADULT PO        Take by  "mouth daily   Refills:  0        ZANAFLEX PO        Take by mouth every 6 hours as needed for muscle spasms   Refills:  0                Orders Needing Specimen Collection     None      Pending Results     No orders found from 2018 to 6/15/2018.            Pending Culture Results     No orders found from 2018 to 6/15/2018.            Thank you for choosing [facility]       Thank you for choosing [facility] for your care. Our goal is always to provide you with excellent care. Hearing back from our patients is one way we can continue to improve our services. Please take a few minutes to complete the written survey that you may receive in the mail after you visit with us. Thank you!        [employer] Information     [employer] lets you send messages to your doctor, view your test results, renew your prescriptions, schedule appointments and more. To sign up, go to [email or web address] . Click on \"Log in\" on the left side of the screen, which will take you to the Welcome page. Then click on \"Sign up Now\" on the right side of the page.     You will be asked to enter the access code listed below, as well as some personal information. Please follow the directions to create your username and password.     Your access code is: [email or web address]  Expires: 2018  9:27 PM     Your access code will  in 90 days. If you need help or a new code, please call your [facility] clinic or [phone].        Care EveryWhere ID     This is your Care EveryWhere ID. This could be used by other organizations to access your [facility] medical records  [id number]        Equal Access to Services     [facility] Hospital[facility] AH: Hadii lis wardo Somanoj, waaxda luqadaha, qaybta kaalmada adeegyada, erik garnica. So [facility] [phone].    ATENCIÓN: Si habla español, tiene a baltazar disposición servicios gratuitos de asistencia lingüística. Llame al [phone].    We comply with applicable federal civil rights laws and " [place] laws. We do not discriminate on the basis of race, color, national origin, age, disability, sex, sexual orientation, or gender identity.            After Visit Summary       This is your record. Keep this with you and show to your community pharmacist(s) and doctor(s) at your next visit.

## 2018-06-15 ENCOUNTER — HOSPITAL ENCOUNTER (EMERGENCY)
Facility: HOSPITAL | Age: 43
Discharge: HOME | End: 2018-06-15
Attending: NURSE PRACTITIONER | Admitting: NURSE PRACTITIONER
Payer: COMMERCIAL

## 2018-06-15 VITALS
DIASTOLIC BLOOD PRESSURE: 79 MMHG | TEMPERATURE: 98.7 F | OXYGEN SATURATION: 100 % | SYSTOLIC BLOOD PRESSURE: 113 MMHG | HEART RATE: 83 BPM

## 2018-06-15 DIAGNOSIS — G43.829 MENSTRUAL MIGRAINE WITHOUT STATUS MIGRAINOSUS, NOT INTRACTABLE: ICD-10-CM

## 2018-06-15 PROCEDURE — 96372 THER/PROPH/DIAG INJ SC/IM: CPT

## 2018-06-15 PROCEDURE — 25000128 H RX IP 250 OP 636: Performed by: NURSE PRACTITIONER

## 2018-06-15 PROCEDURE — 99213 OFFICE O/P EST LOW 20 MIN: CPT | Performed by: NURSE PRACTITIONER

## 2018-06-15 PROCEDURE — G0463 HOSPITAL OUTPT CLINIC VISIT: HCPCS | Mod: 25

## 2018-06-15 RX ORDER — SUMATRIPTAN 100 MG/1
100 TABLET, FILM COATED ORAL
Qty: 9 TABLET | Refills: 0 | Status: SHIPPED | OUTPATIENT
Start: 2018-06-15 | End: 2018-08-31

## 2018-06-15 RX ORDER — SUMATRIPTAN 6 MG/.5ML
6 INJECTION, SOLUTION SUBCUTANEOUS ONCE
Status: COMPLETED | OUTPATIENT
Start: 2018-06-15 | End: 2018-06-15

## 2018-06-15 RX ADMIN — SUMATRIPTAN 6 MG: 6 INJECTION SUBCUTANEOUS at 18:56

## 2018-06-15 ASSESSMENT — ENCOUNTER SYMPTOMS
ABDOMINAL PAIN: 0
COUGH: 0
VOMITING: 0
FEVER: 0
DIZZINESS: 0
RHINORRHEA: 0
WEAKNESS: 0
NECK PAIN: 1
HEADACHES: 1
NUMBNESS: 0

## 2018-06-15 NOTE — ED PROVIDER NOTES
History     Chief Complaint   Patient presents with     Headache     2 days, is out of imitrex, HA is related to menstrual cycle     The history is provided by the patient. No  was used.     Venu Amaya is a 42 year old female who presents with a menstrual migraine for 2 days. Has run out of her monthly Imitrex, last dose was yesterday.   Has chronic daily headaches, this is a typical migraine for her today. No N/V, no vision changes. Neurology has ordered an MRI for her, hasn't been scheduled yet.   Has taken her daily medications today including Tylenol #3 for pain.       Problem List:    Patient Active Problem List    Diagnosis Date Noted     S/P cervical spinal fusion 03/29/2018     Priority: Medium     Neural foraminal stenosis of cervical spine 10/05/2017     Priority: Medium     Opioid dependence (H) 12/23/2015     Priority: Medium     Long term (current) use of opiate analgesic 12/23/2015     Priority: Medium     Chronic low back pain 11/02/2015     Priority: Medium     Overview:   IMO Update       Headache 10/23/2014     Priority: Medium     Overview:   10/23/14: According to neurology consultation of 9/2/14, previous treatments  include pretty much all of the triptans, Maxalt, Imitrex, Amerge, Axert, Frova, and Zomig. She reports that she has tried these when she did not have a constant headache and they did not work when she took them at first onset nor do they work now. She recently tried Imitrex injections and has tried a nasal spray in the past without improvement. The patient does take Compazine for the nausea and reports that that helps somewhat. She has tried numerous preventative medications as well including Topamax, Depakote, gabapentin, Tegretol, Dilantin, nortriptyline, propranolol, and verapamil per her report. She has tried physical therapy as well as trigger injections and denies that any of these things are helpful. She does report she takes ibuprofen 800 mg  twice per day for ankylosing spondylitis. For her headaches, the only thing she has found that helped is Tylenol with Codeine.        Cervicalgia 05/15/2014     Priority: Medium     Myalgia and myositis 05/15/2014     Priority: Medium     Abnormal pap 07/30/2013     Priority: Medium     Ankylosing spondylitis (H) 05/24/2012     Priority: Medium     Flori AN        Dysthymic disorder 09/13/2010     Priority: Medium     Anxiety state 06/02/2009     Priority: Medium     Problem list name updated by automated process. Provider to review       Depressive disorder, not elsewhere classified 10/31/2007     Priority: Medium     Migraine 01/15/2003     Priority: Medium     Problem list name updated by automated process. Provider to review          Past Medical History:    Past Medical History:   Diagnosis Date     Abnormal Pap smear and cervical HPV (human papillo 05/25/2011     Ankylosing spondylitis (H) 05/24/2012     Anxiety state, unspecified 06/02/2009     Cervicalgia pain (neck) 11/03/2011     Chronic Headache disorder  05/25/2011     Depressive disorder, not elsewhere classified 10/31/2007     Dysmenorrhea 04/03/2012     Habitual aborter, antepartum condition or complica 02/10/2005     Inguinal hernia without mention of obstruction or gangrene, unilateral or unspecified, (not specified as recurrent) 06/16/2008     Irregular menstrual cycle 05/25/2011     Mental disorders of mother, postpartum 10/06/2005     Migraine, unspecified, without mention of intractable migraine without mention of status migrainosus 01/15/2003     Moderate dysplasia of cervix 11/03/2011     Pain in thoracic spine 11/03/2011     Scoliosis (and kyphoscoliosis), idiopathic 05/16/2011       Past Surgical History:    Past Surgical History:   Procedure Laterality Date     HEAD & NECK SURGERY       inguinal hernia repair, Germain repair  10/14/2008    RT      TUBAL LIGATION         Family History:    Family History   Problem Relation  Age of Onset     CANCER Mother      lymphoma     CANCER Other      CANCER Other      Colon Cancer Sister        Social History:  Marital Status:   [2]  Social History   Substance Use Topics     Smoking status: Former Smoker     Packs/day: 0.00     Types: Cigarettes     Quit date: 7/13/2015     Smokeless tobacco: Never Used      Comment: Passive Exposure: Yes      Alcohol use No        Medications:      acetaminophen-codeine (TYLENOL/CODEINE #3) 300-30 MG per tablet   aspirin-acetaminophen-caffeine (EXCEDRIN MIGRAINE) 250-250-65 MG per tablet   clonazePAM (KLONOPIN) 1 MG tablet   IBUPROFEN PO   Multiple Vitamins-Minerals (MULTIVITAMIN ADULT PO)   SUMAtriptan (IMITREX) 100 MG tablet   TiZANidine HCl (ZANAFLEX PO)   [DISCONTINUED] PRENATAL VITAMINS PO         Review of Systems   Constitutional: Negative for fever.   HENT: Negative for rhinorrhea.    Respiratory: Negative for cough.    Gastrointestinal: Negative for abdominal pain and vomiting.   Genitourinary: Negative.    Musculoskeletal: Positive for neck pain (Chronic).   Neurological: Positive for headaches. Negative for dizziness, weakness and numbness.       Physical Exam   BP: 113/79  Pulse: 83  Temp: 98.7  F (37.1  C)  SpO2: 100 %      Physical Exam   Constitutional: She is oriented to person, place, and time. She appears well-developed and well-nourished. No distress.   HENT:   Head: Normocephalic and atraumatic.   Right Ear: Tympanic membrane and external ear normal.   Left Ear: Tympanic membrane and external ear normal.   Nose: Nose normal.   Mouth/Throat: Uvula is midline, oropharynx is clear and moist and mucous membranes are normal. No oropharyngeal exudate.   Eyes: Conjunctivae and lids are normal. No scleral icterus.   Neck: Normal range of motion. Neck supple. No rigidity. No edema, no erythema and normal range of motion present.   Cardiovascular: Normal rate, regular rhythm and normal heart sounds.    Pulmonary/Chest: Effort normal and breath  sounds normal. No respiratory distress.   Musculoskeletal: Normal range of motion.   Lymphadenopathy:     She has no cervical adenopathy.   Neurological: She is alert and oriented to person, place, and time. Coordination normal.   Skin: Skin is warm and dry. She is not diaphoretic.   Psychiatric: She has a normal mood and affect.   Nursing note and vitals reviewed.      ED Course     ED Course     Procedures    No results found for this or any previous visit (from the past 24 hour(s)).    Medications   SUMAtriptan (IMITREX) injection 6 mg (6 mg Subcutaneous Given 6/15/18 9136)       Assessments & Plan (with Medical Decision Making)     I have reviewed the nursing notes.  I have reviewed the findings, diagnosis, plan and need for follow up with the patient.  Treated HA with Imitrex which improved her pain.   Reports she will likely return tomorrow for another injection.   Give Epic educational materials.     Discharge Medication List as of 6/15/2018  7:25 PM          Final diagnoses:   Menstrual migraine without status migrainosus, not intractable       6/15/2018   [facility]     Christa Mendoza NP  06/15/18 1948

## 2018-06-15 NOTE — ED PROVIDER NOTES
History     Chief Complaint   Patient presents with     Headache     states this is her normal migraine, would like shot of imitrex     Patient is a 42 year old female presenting with headaches. The history is provided by the patient.   Headache   Pain location:  Generalized  Quality:  Stabbing  Radiates to:  Does not radiate  Severity currently:  7/10  Onset quality:  Gradual  Chronicity:  Chronic  Associated symptoms: photophobia    Associated symptoms: no abdominal pain, no eye pain and no fever          Problem List:    Patient Active Problem List    Diagnosis Date Noted     S/P cervical spinal fusion 03/29/2018     Priority: Medium     Neural foraminal stenosis of cervical spine 10/05/2017     Priority: Medium     Opioid dependence (H) 12/23/2015     Priority: Medium     Long term (current) use of opiate analgesic 12/23/2015     Priority: Medium     Chronic low back pain 11/02/2015     Priority: Medium     Overview:   IMO Update       Headache 10/23/2014     Priority: Medium     Overview:   10/23/14: According to neurology consultation of 9/2/14, previous treatments  include pretty much all of the triptans, Maxalt, Imitrex, Amerge, Axert, Frova, and Zomig. She reports that she has tried these when she did not have a constant headache and they did not work when she took them at first onset nor do they work now. She recently tried Imitrex injections and has tried a nasal spray in the past without improvement. The patient does take Compazine for the nausea and reports that that helps somewhat. She has tried numerous preventative medications as well including Topamax, Depakote, gabapentin, Tegretol, Dilantin, nortriptyline, propranolol, and verapamil per her report. She has tried physical therapy as well as trigger injections and denies that any of these things are helpful. She does report she takes ibuprofen 800 mg twice per day for ankylosing spondylitis. For her headaches, the only thing she has found that  helped is Tylenol with Codeine.        Cervicalgia 05/15/2014     Priority: Medium     Myalgia and myositis 05/15/2014     Priority: Medium     Abnormal pap 07/30/2013     Priority: Medium     Ankylosing spondylitis (H) 05/24/2012     Priority: Medium     Flori Finch PAC        Dysthymic disorder 09/13/2010     Priority: Medium     Anxiety state 06/02/2009     Priority: Medium     Problem list name updated by automated process. Provider to review       Depressive disorder, not elsewhere classified 10/31/2007     Priority: Medium     Migraine 01/15/2003     Priority: Medium     Problem list name updated by automated process. Provider to review          Past Medical History:    Past Medical History:   Diagnosis Date     Abnormal Pap smear and cervical HPV (human papillo 05/25/2011     Ankylosing spondylitis (H) 05/24/2012     Anxiety state, unspecified 06/02/2009     Cervicalgia pain (neck) 11/03/2011     Chronic Headache disorder  05/25/2011     Depressive disorder, not elsewhere classified 10/31/2007     Dysmenorrhea 04/03/2012     Habitual aborter, antepartum condition or complica 02/10/2005     Inguinal hernia without mention of obstruction or gangrene, unilateral or unspecified, (not specified as recurrent) 06/16/2008     Irregular menstrual cycle 05/25/2011     Mental disorders of mother, postpartum 10/06/2005     Migraine, unspecified, without mention of intractable migraine without mention of status migrainosus 01/15/2003     Moderate dysplasia of cervix 11/03/2011     Pain in thoracic spine 11/03/2011     Scoliosis (and kyphoscoliosis), idiopathic 05/16/2011       Past Surgical History:    Past Surgical History:   Procedure Laterality Date     HEAD & NECK SURGERY       inguinal hernia repair, Germain repair  10/14/2008    RT      TUBAL LIGATION         Family History:    Family History   Problem Relation Age of Onset     CANCER Mother      lymphoma     CANCER Other      CANCER Other      Colon  Cancer Sister        Social History:  Marital Status:   [2]  Social History   Substance Use Topics     Smoking status: Former Smoker     Packs/day: 0.00     Types: Cigarettes     Quit date: 7/13/2015     Smokeless tobacco: Never Used      Comment: Passive Exposure: Yes      Alcohol use No        Medications:      acetaminophen-codeine (TYLENOL/CODEINE #3) 300-30 MG per tablet   aspirin-acetaminophen-caffeine (EXCEDRIN MIGRAINE) 250-250-65 MG per tablet   clonazePAM (KLONOPIN) 1 MG tablet   IBUPROFEN PO   Multiple Vitamins-Minerals (MULTIVITAMIN ADULT PO)   TiZANidine HCl (ZANAFLEX PO)   SUMAtriptan Succinate (IMITREX PO)   [DISCONTINUED] PRENATAL VITAMINS PO         Review of Systems   Constitutional: Negative for fever.   Eyes: Positive for photophobia. Negative for pain, discharge, redness and itching.   Respiratory: Negative for shortness of breath.    Cardiovascular: Negative for chest pain.   Gastrointestinal: Negative for abdominal pain.   Neurological: Positive for headaches.   All other systems reviewed and are negative.      Physical Exam   BP: 103/61  Pulse: 78  Temp: 97.6  F (36.4  C)  Resp: 16  SpO2: 99 %      Physical Exam   Constitutional: No distress.   HENT:   Head: Atraumatic.   Mouth/Throat: Oropharynx is clear and moist. No oropharyngeal exudate.   Eyes: Pupils are equal, round, and reactive to light. No scleral icterus.   Cardiovascular: Normal heart sounds and intact distal pulses.    Pulmonary/Chest: Breath sounds normal. No respiratory distress.   Abdominal: Soft. Bowel sounds are normal. There is no tenderness.   Musculoskeletal: She exhibits no edema or tenderness.   Skin: Skin is warm. No rash noted. She is not diaphoretic.       ED Course     ED Course     Procedures                 No results found for this or any previous visit (from the past 24 hour(s)).    Medications   SUMAtriptan (IMITREX) injection 6 mg (6 mg Subcutaneous Given 6/14/18 9210)       Assessments & Plan (with  Medical Decision Making)   Migraine headache  Resolved after receiving imitrex   Dc home  I have reviewed the nursing notes.    I have reviewed the findings, diagnosis, plan and need for follow up with the patient.      Discharge Medication List as of 6/14/2018 11:25 PM          Final diagnoses:   Migraine without status migrainosus, not intractable, unspecified migraine type       6/14/2018   [facility]     Abdoulaye Garcia MD  06/14/18 0785

## 2018-06-15 NOTE — ED NOTES
Pt given verbal and written d/c instructions and pt verbalizes understanding. Pt states that headache is better at 4/10 and is ready to go home. Pt left department ambulatory.

## 2018-06-15 NOTE — ED AVS SNAPSHOT
[facility]    [address] [address]    Phone:  [phone]                                       Venu Amaya   MRN: [id number]    Department:  [facility]   Date of Visit:  6/15/2018           Patient Information     Date Of Birth          1975        Your diagnoses for this visit were:     Menstrual migraine without status migrainosus, not intractable        You were seen by Christa Mendoza NP.      Follow-up Information     Schedule an appointment as soon as possible for a visit with Robin Segovia MD.    Specialty:  Family Practice    Why:  for re-evaluation    Contact information:    [facility]  [address] [facility] [address] [phone]          Follow up with [facility].    Specialty:  EMERGENCY MEDICINE    Why:  If symptoms worsen    Contact information:    [address] [address] [phone]    Additional information:    From [place]: Take [address]. Turn left at [address]. Turn left at the first stoplight on [address]. At the first stop sign, take a right onto [address]. Take a left into the parking lot and continue through until you reach the North enterance of the building.       From [place]: Take [address]. Take the [address] ramp towards [place]. Turn left onto [address]. Take a slight right onto [address]. Turn left at the first stoplight on [address]. At the first stop sign, take a right onto [address]. Take a left into the parking lot and continue through until you reach the North enterance of the building.       From [place]: Take [address]. Take a right at [address]. At the first stop sign, take a right onto [address]. Take a left into the parking lot and continue through until you reach the North enterance of the building.         Discharge Instructions         Rest, drink lots  of fluids.   Follow up with PCP if symptoms persist.   Return here if concerns develop.     Preventing Migraine Headaches: Triggers  The first step in preventing migraines is to learn what triggers them. You may then be able to control your triggers to avoid or reduce the severity of your migraines.  Know your triggers  Be aware that you may have more than one trigger, and that some triggers may work together. Common migraine triggers include:    Food and nutrition. Skipping meals or not drinking enough water can trigger headaches. So can certain foods, such as caffeine, monosodium glutamate (MSG), aged cheese, or sausage.    Alcohol. Red wine and other alcoholic beverages are common migraine triggers.    Chemicals. Scents, cleaning products, gasoline, glue, perfume, and paint can be triggers. So can tobacco smoke, including secondhand smoke.    Emotions. Stress can trigger headaches or make them worse once they begin.    Sleep disruption. Staying up late, sleeping late, and traveling across time zones can disrupt your sleep cycle, triggering headaches.    Hormones. Many women notice that migraines tend to happen at a certain point in their menstrual cycle. Birth control pills or hormone replacement therapy may also trigger migraines.    Environment and weather. Air travel, changes in altitude, air pressure changes, hot sun, or bright or flashing lights can be triggers.  Control your triggers  These are some of the things you can do to try to control triggers:    Avoid triggers if you can. For example, stay clear of alcohol and foods that trigger your headaches. Use unscented household products. Keep regular sleep habits. Manage stress to help control emotional triggers.    Change your behavior at times when triggers can't be avoided. For example, make sure to get enough rest and drink plenty of water while you're traveling. Make sure to carry a hat, sunglasses, and your medicines. Be alert for migraine symptoms, so  you can treat a migraine early if it happens.  Date Last Reviewed: 10/9/2015    6931-2237 [employer]. [address] [place] [address], [place] [address]. All rights reserved. This information is not intended as a substitute for professional medical care. Always follow your healthcare professional's instructions.             Review of your medicines      Our records show that you are taking the medicines listed below. If these are incorrect, please call your family doctor or clinic.        Dose / Directions Last dose taken    acetaminophen-codeine 300-30 MG per tablet   Commonly known as:  TYLENOL WITH CODEINE #3   Quantity:  60 tablet        1-2 tabs up to twice daily as needed.   Refills:  0        aspirin-acetaminophen-caffeine 250-250-65 MG per tablet   Commonly known as:  EXCEDRIN MIGRAINE   Dose:  1 tablet        Take 1 tablet by mouth every 6 hours as needed for headaches   Refills:  0        clonazePAM 1 MG tablet   Commonly known as:  klonoPIN   Dose:  1 mg        Take 1 mg by mouth   Refills:  0        IBUPROFEN PO   Dose:  800 mg        Take 800 mg by mouth   Refills:  0        IMITREX PO   Dose:  100 mg        Take 100 mg by mouth at onset of headache for migraine May repeat once   Refills:  0        MULTIVITAMIN ADULT PO        Take by mouth daily   Refills:  0        ZANAFLEX PO        Take by mouth every 6 hours as needed for muscle spasms   Refills:  0                Orders Needing Specimen Collection     None      Pending Results     No orders found from 6/13/2018 to 6/16/2018.            Pending Culture Results     No orders found from 6/13/2018 to 6/16/2018.            Thank you for choosing [facility]       Thank you for choosing [facility] for your care. Our goal is always to provide you with excellent care. Hearing back from our patients is one way we can continue to improve our services. Please take a few minutes to complete the written survey that you may receive in the mail after you visit  "with us. Thank you!        [employer] Information     [employer] lets you send messages to your doctor, view your test results, renew your prescriptions, schedule appointments and more. To sign up, go to [email or web address] . Click on \"Log in\" on the left side of the screen, which will take you to the Welcome page. Then click on \"Sign up Now\" on the right side of the page.     You will be asked to enter the access code listed below, as well as some personal information. Please follow the directions to create your username and password.     Your access code is: [email or web address]  Expires: 2018  9:27 PM     Your access code will  in 90 days. If you need help or a new code, please call your [facility] clinic or [phone].        Care EveryWhere ID     This is your Care EveryWhere ID. This could be used by other organizations to access your [facility] medical records  [id number]        Equal Access to Services     KING TREVINO : Hadii aad ku hadasho Sojessicaali, waaxda luqadaha, qaybta kaalmada adeegbatsheva, erik duenas . So [facility] [phone].    ATENCIÓN: Si habla español, tiene a baltazar disposición servicios gratuitos de asistencia lingüística. Llame al [phone].    We comply with applicable federal civil rights laws and [place] laws. We do not discriminate on the basis of race, color, national origin, age, disability, sex, sexual orientation, or gender identity.            After Visit Summary       This is your record. Keep this with you and show to your community pharmacist(s) and doctor(s) at your next visit.                  "

## 2018-06-15 NOTE — ED AVS SNAPSHOT
[facility]    [address] [address]    Phone:  [phone]                                       Venu mAaya   MRN: [id number]    Department:  [facility]   Date of Visit:  6/15/2018           After Visit Summary Signature Page     I have received my discharge instructions, and my questions have been answered. I have discussed any challenges I see with this plan with the nurse or doctor.    ..........................................................................................................................................  Patient/Patient Representative Signature      ..........................................................................................................................................  Patient Representative Print Name and Relationship to Patient    ..................................................               ................................................  Date                                            Time    ..........................................................................................................................................  Reviewed by Signature/Title    ...................................................              ..............................................  Date                                                            Time

## 2018-06-15 NOTE — DISCHARGE INSTRUCTIONS
Migraines and Cluster Headaches  Migraines and cluster headaches cause intense, throbbing pain on one side of the head. With a migraine, you may have nausea and vomiting and be sensitive to light and sound. You may also have warning signs, such as flashing lights or loss of parts of your vision, before the pain starts. This is called an aura. Migraines are 3 times more common in women than men. This may be due to hormonal changes during menstruation. Typical migraines may last for 4 to 72 hours untreated.  Cluster headaches recur in groups for days, weeks, or months. The pain is centered around or behind one eye. The eye may also become red or teary, or the eyelid may droop. Migraines and cluster headaches can have many triggers.    Preventing migraines and cluster headaches  Try the following steps:    Don't eat aged cheeses, nuts, beans, chocolate, red wine, or foods that contain caffeine, alcohol, tobacco, nitrates, and MSG.    Try not to skip meals.    Don t work in poor lighting.    Reduce stress as much as you can.    Get plenty of sleep each night.    Exercise regularly under your doctor s guidance.    Don't take headache medicines for more than 3 days, because of the risk of rebound headaches.    Don't take certain prescription medicines that are known to cause rebound headaches.  Relieving the pain  Try these suggestions:    Stay quiet and rest.    Use cold to numb the pain. Wrap ice or a cold can of soda in a cloth. Hold it against the site of pain for 10 minutes. Repeat every 20 minutes.    Stay out of the light. Wear dark glasses, turn out lights, and close the curtains. When outdoors, wear a brimmed hat.    Drink lots of fluids. Sip caffeine-free flat soda to help relieve nausea.    See your doctor if you get migraines or cluster headaches often. There are effective medicines to help treat or prevent them.    Hormone therapy. This may help women whose migraines are related to hormonal changes during  menstruation.  Date Last Reviewed: 12/1/2017 2000-2017 [employer]. [address] [place] [address], [place] [address]. All rights reserved. This information is not intended as a substitute for professional medical care. Always follow your healthcare professional's instructions.

## 2018-06-15 NOTE — ED NOTES
Co headache for a few days, worse with period which just ended.  Tried excedrin without relief. Out of oral Imitrex.

## 2018-06-16 NOTE — DISCHARGE INSTRUCTIONS
Rest, drink lots of fluids.   Follow up with PCP if symptoms persist.   Return here if concerns develop.     Preventing Migraine Headaches: Triggers  The first step in preventing migraines is to learn what triggers them. You may then be able to control your triggers to avoid or reduce the severity of your migraines.  Know your triggers  Be aware that you may have more than one trigger, and that some triggers may work together. Common migraine triggers include:    Food and nutrition. Skipping meals or not drinking enough water can trigger headaches. So can certain foods, such as caffeine, monosodium glutamate (MSG), aged cheese, or sausage.    Alcohol. Red wine and other alcoholic beverages are common migraine triggers.    Chemicals. Scents, cleaning products, gasoline, glue, perfume, and paint can be triggers. So can tobacco smoke, including secondhand smoke.    Emotions. Stress can trigger headaches or make them worse once they begin.    Sleep disruption. Staying up late, sleeping late, and traveling across time zones can disrupt your sleep cycle, triggering headaches.    Hormones. Many women notice that migraines tend to happen at a certain point in their menstrual cycle. Birth control pills or hormone replacement therapy may also trigger migraines.    Environment and weather. Air travel, changes in altitude, air pressure changes, hot sun, or bright or flashing lights can be triggers.  Control your triggers  These are some of the things you can do to try to control triggers:    Avoid triggers if you can. For example, stay clear of alcohol and foods that trigger your headaches. Use unscented household products. Keep regular sleep habits. Manage stress to help control emotional triggers.    Change your behavior at times when triggers can't be avoided. For example, make sure to get enough rest and drink plenty of water while you're traveling. Make sure to carry a hat, sunglasses, and your medicines. Be alert for  migraine symptoms, so you can treat a migraine early if it happens.  Date Last Reviewed: 10/9/2015    9484-7446 [employer]. [address] [place] [address], [place] [address]. All rights reserved. This information is not intended as a substitute for professional medical care. Always follow your healthcare professional's instructions.

## 2018-06-22 ENCOUNTER — HOSPITAL ENCOUNTER (EMERGENCY)
Facility: HOSPITAL | Age: 43
Discharge: HOME | End: 2018-06-22
Attending: EMERGENCY MEDICINE | Admitting: EMERGENCY MEDICINE
Payer: COMMERCIAL

## 2018-06-22 VITALS
WEIGHT: 110 LBS | BODY MASS INDEX: 19.49 KG/M2 | OXYGEN SATURATION: 100 % | RESPIRATION RATE: 18 BRPM | HEART RATE: 92 BPM | SYSTOLIC BLOOD PRESSURE: 113 MMHG | DIASTOLIC BLOOD PRESSURE: 73 MMHG | TEMPERATURE: 97 F

## 2018-06-22 DIAGNOSIS — G43.719 INTRACTABLE CHRONIC MIGRAINE WITHOUT AURA AND WITHOUT STATUS MIGRAINOSUS: Primary | ICD-10-CM

## 2018-06-22 PROCEDURE — 99284 EMERGENCY DEPT VISIT MOD MDM: CPT | Mod: Z6 | Performed by: EMERGENCY MEDICINE

## 2018-06-22 PROCEDURE — 25000128 H RX IP 250 OP 636: Performed by: EMERGENCY MEDICINE

## 2018-06-22 PROCEDURE — 99283 EMERGENCY DEPT VISIT LOW MDM: CPT

## 2018-06-22 RX ORDER — SUMATRIPTAN 6 MG/.5ML
6 INJECTION, SOLUTION SUBCUTANEOUS ONCE
Status: COMPLETED | OUTPATIENT
Start: 2018-06-22 | End: 2018-06-22

## 2018-06-22 RX ADMIN — SUMATRIPTAN 6 MG: 6 INJECTION SUBCUTANEOUS at 06:50

## 2018-06-22 NOTE — ED AVS SNAPSHOT
[facility]    [address] [address]    Phone:  [phone]                                       Venu Amaya   MRN: [id number]    Department:  [facility]   Date of Visit:  6/22/2018           After Visit Summary Signature Page     I have received my discharge instructions, and my questions have been answered. I have discussed any challenges I see with this plan with the nurse or doctor.    ..........................................................................................................................................  Patient/Patient Representative Signature      ..........................................................................................................................................  Patient Representative Print Name and Relationship to Patient    ..................................................               ................................................  Date                                            Time    ..........................................................................................................................................  Reviewed by Signature/Title    ...................................................              ..............................................  Date                                                            Time

## 2018-06-22 NOTE — ED PROVIDER NOTES
"  History     Chief Complaint   Patient presents with     Headache     \"headache got worse at 0400, wanting a shot of Imitrex.\"     HPI  Venu Amaya is a 42 year old female who presents to the emergency department with complaints of migraine.  Patient woke up at 4 this morning with global headache.  Denies any nausea, vomiting, syncopal episodes, visual changes.  She has been known for migraine headaches for a long time and she did not have Imitrex to take.  She has a prescription but has not filled it yet.  Headaches, respond well to Imitrex.  She is requesting Imitrex injection to help with the headache before she can go fill her prescription in the pharmacy.  She denies any chest pain, fever, neck pain or stiffness or palpitations.  The headache is global and does not radiate.    Problem List:    Patient Active Problem List    Diagnosis Date Noted     S/P cervical spinal fusion 03/29/2018     Priority: Medium     Neural foraminal stenosis of cervical spine 10/05/2017     Priority: Medium     Opioid dependence (H) 12/23/2015     Priority: Medium     Long term (current) use of opiate analgesic 12/23/2015     Priority: Medium     Chronic low back pain 11/02/2015     Priority: Medium     Overview:   IMO Update       Headache 10/23/2014     Priority: Medium     Overview:   10/23/14: According to neurology consultation of 9/2/14, previous treatments  include pretty much all of the triptans, Maxalt, Imitrex, Amerge, Axert, Frova, and Zomig. She reports that she has tried these when she did not have a constant headache and they did not work when she took them at first onset nor do they work now. She recently tried Imitrex injections and has tried a nasal spray in the past without improvement. The patient does take Compazine for the nausea and reports that that helps somewhat. She has tried numerous preventative medications as well including Topamax, Depakote, gabapentin, Tegretol, Dilantin, nortriptyline, " propranolol, and verapamil per her report. She has tried physical therapy as well as trigger injections and denies that any of these things are helpful. She does report she takes ibuprofen 800 mg twice per day for ankylosing spondylitis. For her headaches, the only thing she has found that helped is Tylenol with Codeine.        Cervicalgia 05/15/2014     Priority: Medium     Myalgia and myositis 05/15/2014     Priority: Medium     Abnormal pap 07/30/2013     Priority: Medium     Ankylosing spondylitis (H) 05/24/2012     Priority: Medium     Flori Finch PAC        Dysthymic disorder 09/13/2010     Priority: Medium     Anxiety state 06/02/2009     Priority: Medium     Problem list name updated by automated process. Provider to review       Depressive disorder, not elsewhere classified 10/31/2007     Priority: Medium     Migraine 01/15/2003     Priority: Medium     Problem list name updated by automated process. Provider to review          Past Medical History:    Past Medical History:   Diagnosis Date     Abnormal Pap smear and cervical HPV (human papillo 05/25/2011     Ankylosing spondylitis (H) 05/24/2012     Anxiety state, unspecified 06/02/2009     Cervicalgia pain (neck) 11/03/2011     Chronic Headache disorder  05/25/2011     Depressive disorder, not elsewhere classified 10/31/2007     Dysmenorrhea 04/03/2012     Habitual aborter, antepartum condition or complica 02/10/2005     Inguinal hernia without mention of obstruction or gangrene, unilateral or unspecified, (not specified as recurrent) 06/16/2008     Irregular menstrual cycle 05/25/2011     Mental disorders of mother, postpartum 10/06/2005     Migraine, unspecified, without mention of intractable migraine without mention of status migrainosus 01/15/2003     Moderate dysplasia of cervix 11/03/2011     Pain in thoracic spine 11/03/2011     Scoliosis (and kyphoscoliosis), idiopathic 05/16/2011       Past Surgical History:    Past Surgical History:    Procedure Laterality Date     HEAD & NECK SURGERY       inguinal hernia repair, Germain repair  10/14/2008    RT      TUBAL LIGATION         Family History:    Family History   Problem Relation Age of Onset     Cancer Mother      lymphoma     Cancer Other      Cancer Other      Colon Cancer Sister        Social History:  Marital Status:   [2]  Social History   Substance Use Topics     Smoking status: Former Smoker     Packs/day: 0.00     Types: Cigarettes     Quit date: 7/13/2015     Smokeless tobacco: Never Used      Comment: Passive Exposure: Yes      Alcohol use No        Medications:      acetaminophen-codeine (TYLENOL/CODEINE #3) 300-30 MG per tablet   aspirin-acetaminophen-caffeine (EXCEDRIN MIGRAINE) 250-250-65 MG per tablet   clonazePAM (KLONOPIN) 1 MG tablet   IBUPROFEN PO   Multiple Vitamins-Minerals (MULTIVITAMIN ADULT PO)   SUMAtriptan (IMITREX) 100 MG tablet   TiZANidine HCl (ZANAFLEX PO)   [DISCONTINUED] PRENATAL VITAMINS PO         Review of Systems   All other systems reviewed and are negative.      Physical Exam   BP: 113/73  Pulse: 92  Temp: 97  F (36.1  C)  Resp: 18  Weight: 49.9 kg (110 lb)  SpO2: 100 %      Physical Exam   Constitutional: She is oriented to person, place, and time. She appears well-developed and well-nourished. She appears distressed.   HENT:   Head: Normocephalic and atraumatic.   Eyes: EOM are normal. Pupils are equal, round, and reactive to light.   Cardiovascular: Normal rate, regular rhythm and normal heart sounds.    Pulmonary/Chest: Breath sounds normal. No respiratory distress. She has no wheezes.   Abdominal: She exhibits no distension.   Neurological: She is alert and oriented to person, place, and time. No cranial nerve deficit.   Skin: She is not diaphoretic.   Nursing note and vitals reviewed.      ED Course   Stable ED course    ED Course     Procedures         No results found for this or any previous visit (from the past 24 hour(s)).    Medications    SUMAtriptan (IMITREX) injection 6 mg (6 mg Subcutaneous Given 6/22/18 0650)       Assessments & Plan (with Medical Decision Making)   Migraine headache: Patient responded well to 1 dose of Imitrex IM.  The headache subsided after this.  Now she is being discharged home on Imitrex and she already has a prescription at home to use as needed for at the beginning of headache.  Discussed migraine prophylaxis and she will follow-up with her PCP.  Patient subsequently discharged home.    I have reviewed the nursing notes.    I have reviewed the findings, diagnosis, plan and need for follow up with the patient.    Discharge Medication List as of 6/22/2018  7:39 AM          Final diagnoses:   Intractable chronic migraine without aura and without status migrainosus       6/22/2018   [facility]     Lucas Brock MD  06/22/18 0805

## 2018-06-22 NOTE — DISCHARGE INSTRUCTIONS
Migraines and Cluster Headaches  Migraines and cluster headaches cause intense, throbbing pain on one side of the head. With a migraine, you may have nausea and vomiting and be sensitive to light and sound. You may also have warning signs, such as flashing lights or loss of parts of your vision, before the pain starts. This is called an aura. Migraines are 3 times more common in women than men. This may be due to hormonal changes during menstruation. Typical migraines may last for 4 to 72 hours untreated.  Cluster headaches recur in groups for days, weeks, or months. The pain is centered around or behind one eye. The eye may also become red or teary, or the eyelid may droop. Migraines and cluster headaches can have many triggers.    Preventing migraines and cluster headaches  Try the following steps:    Don't eat aged cheeses, nuts, beans, chocolate, red wine, or foods that contain caffeine, alcohol, tobacco, nitrates, and MSG.    Try not to skip meals.    Don t work in poor lighting.    Reduce stress as much as you can.    Get plenty of sleep each night.    Exercise regularly under your doctor s guidance.    Don't take headache medicines for more than 3 days, because of the risk of rebound headaches.    Don't take certain prescription medicines that are known to cause rebound headaches.  Relieving the pain  Try these suggestions:    Stay quiet and rest.    Use cold to numb the pain. Wrap ice or a cold can of soda in a cloth. Hold it against the site of pain for 10 minutes. Repeat every 20 minutes.    Stay out of the light. Wear dark glasses, turn out lights, and close the curtains. When outdoors, wear a brimmed hat.    Drink lots of fluids. Sip caffeine-free flat soda to help relieve nausea.    See your doctor if you get migraines or cluster headaches often. There are effective medicines to help treat or prevent them.    Hormone therapy. This may help women whose migraines are related to hormonal changes during  menstruation.  Date Last Reviewed: 12/1/2017 2000-2017 [employer]. [address] [place] [address], [place] [address]. All rights reserved. This information is not intended as a substitute for professional medical care. Always follow your healthcare professional's instructions.

## 2018-06-22 NOTE — ED NOTES
"In ED for \"having a headache which got worse around 0400 this morning, wants a shot of Imitrex.  Out of Imitrex at home. \"  "

## 2018-07-02 ENCOUNTER — HOSPITAL ENCOUNTER (EMERGENCY)
Facility: HOSPITAL | Age: 43
Discharge: HOME | End: 2018-07-02
Attending: PHYSICIAN ASSISTANT | Admitting: PHYSICIAN ASSISTANT
Payer: COMMERCIAL

## 2018-07-02 VITALS
HEART RATE: 73 BPM | DIASTOLIC BLOOD PRESSURE: 66 MMHG | RESPIRATION RATE: 16 BRPM | SYSTOLIC BLOOD PRESSURE: 114 MMHG | OXYGEN SATURATION: 99 % | TEMPERATURE: 98.8 F

## 2018-07-02 DIAGNOSIS — G43.019 INTRACTABLE MIGRAINE WITHOUT AURA AND WITHOUT STATUS MIGRAINOSUS: ICD-10-CM

## 2018-07-02 PROCEDURE — G0463 HOSPITAL OUTPT CLINIC VISIT: HCPCS | Mod: 25

## 2018-07-02 PROCEDURE — 96372 THER/PROPH/DIAG INJ SC/IM: CPT

## 2018-07-02 PROCEDURE — 25000128 H RX IP 250 OP 636: Performed by: PHYSICIAN ASSISTANT

## 2018-07-02 PROCEDURE — 99213 OFFICE O/P EST LOW 20 MIN: CPT | Performed by: PHYSICIAN ASSISTANT

## 2018-07-02 RX ORDER — SUMATRIPTAN 6 MG/.5ML
6 INJECTION, SOLUTION SUBCUTANEOUS ONCE
Status: COMPLETED | OUTPATIENT
Start: 2018-07-02 | End: 2018-07-02

## 2018-07-02 RX ADMIN — SUMATRIPTAN 6 MG: 6 INJECTION SUBCUTANEOUS at 13:43

## 2018-07-02 ASSESSMENT — ENCOUNTER SYMPTOMS
LIGHT-HEADEDNESS: 0
CONSTITUTIONAL NEGATIVE: 1
AGITATION: 0
NAUSEA: 1
CONFUSION: 0
PHOTOPHOBIA: 1
HEADACHES: 1
VOMITING: 0
CARDIOVASCULAR NEGATIVE: 1
DIZZINESS: 0

## 2018-07-02 NOTE — ED AVS SNAPSHOT
[facility]    [address] [address]    Phone:  [phone]                                       Venu Amaya   MRN: [id number]    Department:  [facility]   Date of Visit:  7/2/2018           Patient Information     Date Of Birth          1975        Your diagnoses for this visit were:     Intractable migraine without aura and without status migrainosus        You were seen by Wendy Harkins PA.      Follow-up Information     Follow up with Robin Segovia MD.    Specialty:  Family Practice    Why:  If symptoms worsen    Contact information:    [facility]  [address] [facility] [address] [phone]          Follow up with [facility].    Specialty:  EMERGENCY MEDICINE    Why:  if further concerns develop    Contact information:    [address] [address] [phone]    Additional information:    From [facility]: Take [address]. Turn left at [address]. Turn left at the first stoplight on [address]. At the first stop sign, take a right onto [address]. Take a left into the parking lot and continue through until you reach the North enterance of the building.       From [place]: Take [address]. Take the [address] ramp towards [place]. Turn left onto [address]. Take a slight right onto [address]. Turn left at the first stoplight on [address]. At the first stop sign, take a right onto [address]. Take a left into the parking lot and continue through until you reach the North enterance of the building.       From [place]: Take [address]. Take a right at [address]. At the first stop sign, take a right onto [address]. Take a left into the parking lot and continue through until you reach the North enterance of the building.       Discharge References/Attachments     HEADACHE, MIGRAINE (CLASSICAL) (ENGLISH)    MIGRAINE  AND TENSION HEADACHES, WHAT ARE? (ENGLISH)    MIGRAINE HEADACHES, PREVENTING, TRIGGERS (ENGLISH)         Review of your medicines      Our records show that you are taking the medicines listed below. If these are incorrect, please call your family doctor or clinic.        Dose / Directions Last dose taken    acetaminophen-codeine 300-30 MG per tablet   Commonly known as:  TYLENOL WITH CODEINE #3   Quantity:  60 tablet        1-2 tabs up to twice daily as needed.   Refills:  0        aspirin-acetaminophen-caffeine 250-250-65 MG per tablet   Commonly known as:  EXCEDRIN MIGRAINE   Dose:  1 tablet        Take 1 tablet by mouth every 6 hours as needed for headaches   Refills:  0        clonazePAM 1 MG tablet   Commonly known as:  klonoPIN   Dose:  1 mg        Take 1 mg by mouth 2 times daily as needed   Refills:  0        IBUPROFEN PO   Dose:  800 mg        Take 800 mg by mouth every 8 hours as needed   Refills:  0        MULTIVITAMIN ADULT PO        Take by mouth daily   Refills:  0        SUMAtriptan 100 MG tablet   Commonly known as:  IMITREX   Dose:  100 mg   Quantity:  9 tablet        Take 1 tablet (100 mg) by mouth at onset of headache for migraine   Refills:  0        ZANAFLEX PO        Take by mouth every 6 hours as needed for muscle spasms   Refills:  0                Orders Needing Specimen Collection     None      Pending Results     No orders found from 6/30/2018 to 7/3/2018.            Pending Culture Results     No orders found from 6/30/2018 to 7/3/2018.            Thank you for choosing [facility]       Thank you for choosing [facility] for your care. Our goal is always to provide you with excellent care. Hearing back from our patients is one way we can continue to improve our services. Please take a few minutes to complete the written survey that you may receive in the mail after you visit with us. Thank you!        [employer] Information     [employer] lets you send messages to your doctor, view your test  "results, renew your prescriptions, schedule appointments and more. To sign up, go to [email or web address] . Click on \"Log in\" on the left side of the screen, which will take you to the Welcome page. Then click on \"Sign up Now\" on the right side of the page.     You will be asked to enter the access code listed below, as well as some personal information. Please follow the directions to create your username and password.     Your access code is: [email or web address]  Expires: 2018  9:27 PM     Your access code will  in 90 days. If you need help or a new code, please call your [facility] clinic or [phone].        Care EveryWhere ID     This is your Care EveryWhere ID. This could be used by other organizations to access your [facility] medical records  [id number]        Equal Access to Services     [place] : Buddy Magana, lukas ramsey, caroline josealjosé antonio ashton, erik duenas . So [facility] [phone].    ATENCIÓN: Si habla español, tiene a baltazar disposición servicios gratuitos de asistencia lingüística. Llame al [phone].    We comply with applicable federal civil rights laws and [place] laws. We do not discriminate on the basis of race, color, national origin, age, disability, sex, sexual orientation, or gender identity.            After Visit Summary       This is your record. Keep this with you and show to your community pharmacist(s) and doctor(s) at your next visit.                  "

## 2018-07-02 NOTE — ED PROVIDER NOTES
"  History     Chief Complaint   Patient presents with     Headache     wants IM meds     The history is provided by the patient. No  was used.     Venu Amaya is a 42 year old female who has 2 hours of frontal and temple headache, squeezing/throbbing. Has some nausea bur does not wish to have zofran at this time. States she has some at home. Light bothers her eyes. No v/d/f. States this is one of her \"normal\" migraines. Pt states she is waiting to get a cervical MRI ordered by her PCP, due to her chronic headaches.    Problem List:    Patient Active Problem List    Diagnosis Date Noted     S/P cervical spinal fusion 03/29/2018     Priority: Medium     Neural foraminal stenosis of cervical spine 10/05/2017     Priority: Medium     Opioid dependence (H) 12/23/2015     Priority: Medium     Long term (current) use of opiate analgesic 12/23/2015     Priority: Medium     Chronic low back pain 11/02/2015     Priority: Medium     Overview:   IMO Update       Headache 10/23/2014     Priority: Medium     Overview:   10/23/14: According to neurology consultation of 9/2/14, previous treatments  include pretty much all of the triptans, Maxalt, Imitrex, Amerge, Axert, Frova, and Zomig. She reports that she has tried these when she did not have a constant headache and they did not work when she took them at first onset nor do they work now. She recently tried Imitrex injections and has tried a nasal spray in the past without improvement. The patient does take Compazine for the nausea and reports that that helps somewhat. She has tried numerous preventative medications as well including Topamax, Depakote, gabapentin, Tegretol, Dilantin, nortriptyline, propranolol, and verapamil per her report. She has tried physical therapy as well as trigger injections and denies that any of these things are helpful. She does report she takes ibuprofen 800 mg twice per day for ankylosing spondylitis. For her headaches, " the only thing she has found that helped is Tylenol with Codeine.        Cervicalgia 05/15/2014     Priority: Medium     Myalgia and myositis 05/15/2014     Priority: Medium     Abnormal pap 07/30/2013     Priority: Medium     Ankylosing spondylitis (H) 05/24/2012     Priority: Medium     Flori Finch PAC        Dysthymic disorder 09/13/2010     Priority: Medium     Anxiety state 06/02/2009     Priority: Medium     Problem list name updated by automated process. Provider to review       Depressive disorder, not elsewhere classified 10/31/2007     Priority: Medium     Migraine 01/15/2003     Priority: Medium     Problem list name updated by automated process. Provider to review          Past Medical History:    Past Medical History:   Diagnosis Date     Abnormal Pap smear and cervical HPV (human papillo 05/25/2011     Ankylosing spondylitis (H) 05/24/2012     Anxiety state, unspecified 06/02/2009     Cervicalgia pain (neck) 11/03/2011     Chronic Headache disorder  05/25/2011     Depressive disorder, not elsewhere classified 10/31/2007     Dysmenorrhea 04/03/2012     Habitual aborter, antepartum condition or complica 02/10/2005     Inguinal hernia without mention of obstruction or gangrene, unilateral or unspecified, (not specified as recurrent) 06/16/2008     Irregular menstrual cycle 05/25/2011     Mental disorders of mother, postpartum 10/06/2005     Migraine, unspecified, without mention of intractable migraine without mention of status migrainosus 01/15/2003     Moderate dysplasia of cervix 11/03/2011     Pain in thoracic spine 11/03/2011     Scoliosis (and kyphoscoliosis), idiopathic 05/16/2011       Past Surgical History:    Past Surgical History:   Procedure Laterality Date     HEAD & NECK SURGERY       inguinal hernia repair, Germain repair  10/14/2008    RT      TUBAL LIGATION         Family History:    Family History   Problem Relation Age of Onset     Cancer Mother      lymphoma     Cancer Other       Cancer Other      Colon Cancer Sister        Social History:  Marital Status:   [2]  Social History   Substance Use Topics     Smoking status: Former Smoker     Packs/day: 0.00     Types: Cigarettes     Quit date: 7/13/2015     Smokeless tobacco: Never Used      Comment: Passive Exposure: Yes      Alcohol use No        Medications:      acetaminophen-codeine (TYLENOL/CODEINE #3) 300-30 MG per tablet   aspirin-acetaminophen-caffeine (EXCEDRIN MIGRAINE) 250-250-65 MG per tablet   clonazePAM (KLONOPIN) 1 MG tablet   IBUPROFEN PO   Multiple Vitamins-Minerals (MULTIVITAMIN ADULT PO)   SUMAtriptan (IMITREX) 100 MG tablet   TiZANidine HCl (ZANAFLEX PO)   [DISCONTINUED] PRENATAL VITAMINS PO         Review of Systems   Constitutional: Negative.    Eyes: Positive for photophobia. Negative for visual disturbance.   Cardiovascular: Negative.    Gastrointestinal: Positive for nausea. Negative for vomiting.   Skin: Negative for rash.   Neurological: Positive for headaches. Negative for dizziness and light-headedness.   Psychiatric/Behavioral: Negative for agitation, behavioral problems and confusion.       Physical Exam   BP: 114/66  Pulse: 73  Temp: 98.8  F (37.1  C)  Resp: 16  SpO2: 99 %      Physical Exam   Constitutional: She is oriented to person, place, and time. She appears well-developed and well-nourished. No distress.   HENT:   Head: Normocephalic and atraumatic.   Eyes: Conjunctivae and EOM are normal. Pupils are equal, round, and reactive to light. Right eye exhibits no discharge. Left eye exhibits no discharge.   Neck: Normal range of motion. Neck supple.   Cardiovascular: Normal rate, regular rhythm and normal heart sounds.    Pulmonary/Chest: Effort normal and breath sounds normal. No respiratory distress.   Neurological: She is alert and oriented to person, place, and time. No cranial nerve deficit. Coordination normal.   Skin: She is not diaphoretic.   Psychiatric: Her speech is delayed. She is  slowed. Cognition and memory are normal.   Nursing note and vitals reviewed.      ED Course     ED Course     Procedures          Medications   SUMAtriptan (IMITREX) injection 6 mg (6 mg Subcutaneous Given 7/2/18 1343)   Pt observed x 20 minutes. Pt tolerated well.    Assessments & Plan (with Medical Decision Making)     I have reviewed the nursing notes.    I have reviewed the findings, diagnosis, plan and need for follow up with the patient.          Final diagnoses:   Intractable migraine without aura and without status migrainosus         Patient verbally educated and given appropriate education sheets for the diagnoses and has no questions.  Take medications as directed.   Follow up with your Primary Care provider if symptoms increase or if further concerns develop, return to the ER  Wendy Harkins Certified  Physician Assistant  7/2/2018  1:58 PM  URGENT CARE CLINIC      7/2/2018   [facility]     Wendy Harkins PA  07/02/18 [id number]

## 2018-08-27 ENCOUNTER — HOSPITAL ENCOUNTER (EMERGENCY)
Facility: HOSPITAL | Age: 43
Discharge: HOME | End: 2018-08-27
Attending: NURSE PRACTITIONER | Admitting: NURSE PRACTITIONER
Payer: COMMERCIAL

## 2018-08-27 VITALS
TEMPERATURE: 97.5 F | RESPIRATION RATE: 16 BRPM | DIASTOLIC BLOOD PRESSURE: 74 MMHG | SYSTOLIC BLOOD PRESSURE: 113 MMHG | OXYGEN SATURATION: 100 % | HEART RATE: 86 BPM

## 2018-08-27 DIAGNOSIS — G43.009 MIGRAINE WITHOUT AURA AND WITHOUT STATUS MIGRAINOSUS, NOT INTRACTABLE: ICD-10-CM

## 2018-08-27 PROCEDURE — 99213 OFFICE O/P EST LOW 20 MIN: CPT | Performed by: NURSE PRACTITIONER

## 2018-08-27 PROCEDURE — G0463 HOSPITAL OUTPT CLINIC VISIT: HCPCS | Mod: 25

## 2018-08-27 PROCEDURE — 96372 THER/PROPH/DIAG INJ SC/IM: CPT

## 2018-08-27 PROCEDURE — 25000128 H RX IP 250 OP 636: Performed by: NURSE PRACTITIONER

## 2018-08-27 RX ORDER — SUMATRIPTAN 6 MG/.5ML
6 INJECTION, SOLUTION SUBCUTANEOUS ONCE
Status: COMPLETED | OUTPATIENT
Start: 2018-08-27 | End: 2018-08-27

## 2018-08-27 RX ADMIN — SUMATRIPTAN 6 MG: 6 INJECTION SUBCUTANEOUS at 19:48

## 2018-08-27 ASSESSMENT — ENCOUNTER SYMPTOMS
FEVER: 0
EYE PAIN: 0
HEADACHES: 1
APPETITE CHANGE: 0
FATIGUE: 0
PHOTOPHOBIA: 0
NAUSEA: 1
CHILLS: 0

## 2018-08-27 NOTE — ED AVS SNAPSHOT
[facility]    [address] [address]    Phone:  [phone]                                       Venu Amaya   MRN: [id number]    Department:  [facility]   Date of Visit:  8/27/2018           Patient Information     Date Of Birth          1975        Your diagnoses for this visit were:     Migraine without aura and without status migrainosus, not intractable        You were seen by Kay Betts NP.      Follow-up Information     Follow up with [facility].    Specialty:  EMERGENCY MEDICINE    Why:  As needed, If symptoms worsen, or concerns develop    Contact information:    [address] [address] [phone]    Additional information:    From [facility]: Take [address]. Turn left at [address]. Turn left at the first stoplight on [address]. At the first stop sign, take a right onto [address]. Take a left into the parking lot and continue through until you reach the North enterance of the building.       From [place]: Take [address]. Take the [address] ramp towards [place]. Turn left onto [address]. Take a slight right onto [address]. Turn left at the first stoplight on [address]. At the first stop sign, take a right onto [address]. Take a left into the parking lot and continue through until you reach the North enterance of the building.       From [place]: Take [address]. Take a right at [address]. At the first stop sign, take a right onto [address]. Take a left into the parking lot and continue through until you reach the North enterance of the building.         Follow up with Robin Segovia MD.    Specialty:  Family Practice    Why:  As needed, if symptoms do not improve    Contact information:    [facility]  [address] [facility] [address]  [phone]          Discharge Instructions         *  Migraine Headache  Migraine headaches are related to changes in blood flow to the brain. This causes throbbing or constant pain on one or both sides of the head. The pain may last from a few hours to several days. There is usually nausea, vomiting, sensitivity to light and sound, and blurred vision. A migraine attack may be triggered by emotional stress, hormone changes during the menstrual cycle, oral contraceptives, alcohol use, certain foods containing tyramine, eye strain, weather changes, missing meals, or too little or too much sleep.  Home Care For This Headache:  1) If you were given pain medicine for this headache, do not drive yourself home . Arrange for a ride, instead. When you get home, try to sleep. You should feel much better when you wake up.  2) Migraine headaches may improve with an ice pack on the forehead or at the base of the skull. Heat to the back of your neck may relieve any neck spasm.  3) Drink only clear liquids or eat a very light diet to avoid nausea/vomiting until symptoms improve.  Preventing Future Headaches:  1) Pay attention to those factors that seem to trigger your headache. Try to avoid them when you can. If you have frequent headaches, it is useful to keep a diary of what you were doing, feeling or eating in the hours before each attack. Show this to your doctor to help find the cause of your headaches.  a) If you feel that stress is a factor in your headaches, look at the sources of stress in your life. Find ways to release the build-up of those stresses by using regular exercise, relaxation methods (yoga, meditation), bio-feedback or simply taking time-out for yourself. For more information about this, consult your doctor or go to a local bookstore and review books and tapes on this subject.  b) Tyramine is a substance present in the following foods : chocolate, yogurt, all cheeses except cottage cheese and cream cheese. smoked or pickled fish and meat (including herring,  caviar, bologna, pepperoni, salami), liver, avocados, bananas, figs, raisins, and red wine. Be aware that these foods may trigger a migraine in some persons. Try taking these foods out of your diet for 1-2 months to see if this reduces headache frequency.  Treating Future Attacks:  1) At the first sign of a migraine headache, take a medicine to stop it if one has been prescribed for you. If not, take acetaminophen (Tylenol) or ibuprofen (Motrin, Advil) if you are able to take these. The sooner you take medicine, the better it will work.  2) You may also want to find a quiet, dark, comfortable place to sit or lie down. Let yourself relax or sleep.  3) An ice pack on the forehead or area of greatest pain may also help.   Follow Up  with your doctor if the headache is not better within the next 24 hours. If you have frequent headaches you should discuss a treatment plan with your primary care doctor. Ask if you can have medicine to take at home the next time you get a bad headache. Poorly controlled chronic headaches may require a referral to a neurologist (headache specialist).  Get Prompt Medical Attention  if any of the following occur:    Your head pain gets worse, or does not improve within 24 hours    Repeated vomiting (can t keep liquids down)    Sinus or ear or throat pain (not already reported)    Fever of 101  F (38.3  C) or higher, or as directed by your healthcare provider    Stiff neck    Extreme drowsiness, confusion or fainting    Weakness of an arm or leg or one side of the face    Difficulty with speech or vision    [id number] [employer]. [address] [place] [address], [place] [address]. All rights reserved. This information is not intended as a substitute for professional medical care. Always follow your healthcare professional's instructions.  This information has been modified by your health care provider with permission from the publisher.          Your next 10 appointments already scheduled      Aug 29, 2018  8:15 AM CDT   (Arrive by 8:00 AM)   MR BRAIN W/O & W CONTRAST with HIMR1   [facility] ([facility] )    [address] [address] [phone]           Take your medicines as usual, unless your doctor tells you not to. Bring a list of your current medicines to your exam (including vitamins, minerals and over-the-counter drugs).  You may or may not receive intravenous (IV) contrast for this exam pending the discretion of the Radiologist.  You do not need to do anything special to prepare.  The MRI machine uses a strong magnet. Please wear clothes without metal (snaps, zippers). A sweatsuit works well, or we may give you a hospital gown.  Please remove any body piercings and hair extensions before you arrive. You will also remove watches, jewelry, hairpins, wallets, dentures, partial dental plates and hearing aids. You may wear contact lenses, and you may be able to wear your rings. We have a safe place to keep your personal items, but it is safer to leave them at home.  **IMPORTANT** THE INSTRUCTIONS BELOW ARE ONLY FOR THOSE PATIENTS WHO HAVE BEEN PRESCRIBED SEDATION OR GENERAL ANESTHESIA DURING THEIR MRI PROCEDURE:  IF YOUR DOCTOR PRESCRIBED ORAL SEDATION (take medicine to help you relax during your exam):   You must get the medicine from your doctor (oral medication) before you arrive. Bring the medicine to the exam. Do not take it at home. You ll be told when to take it upon arriving for your exam.   Arrive one hour early. Bring someone who can take you home after the test. Your medicine will make you sleepy. After the exam, you may not drive, take a bus or take a taxi by yourself.  IF YOUR DOCTOR PRESCRIBED IV SEDATION:   Arrive one hour early. Bring someone who can take you home after the test. Your medicine will make you sleepy. After the exam, you may not drive, take a bus or take a taxi by yourself.   No eating 6 hours before your exam. You may have clear liquids  up until 4 hours before your exam. (Clear liquids include water, clear tea, black coffee and fruit juice without pulp.)  IF YOUR DOCTOR PRESCRIBED ANESTHESIA (be asleep for your exam):   Arrive 1 1/2 hours early. Bring someone who can take you home after the test. You may not drive, take a bus or take a taxi by yourself.   No eating 8 hours before your exam. You may have clear liquids up until 4 hours before your exam. (Clear liquids include water, clear tea, black coffee and fruit juice without pulp.)   You will spend four to five hours in the recovery room.  Please call the Imaging Department at your exam site with any questions.                 Review of your medicines      Our records show that you are taking the medicines listed below. If these are incorrect, please call your family doctor or clinic.        Dose / Directions Last dose taken    acetaminophen-codeine 300-30 MG per tablet   Commonly known as:  TYLENOL WITH CODEINE #3   Quantity:  60 tablet        1-2 tabs up to twice daily as needed.   Refills:  0        aspirin-acetaminophen-caffeine 250-250-65 MG per tablet   Commonly known as:  EXCEDRIN MIGRAINE   Dose:  1 tablet        Take 1 tablet by mouth every 6 hours as needed for headaches   Refills:  0        clonazePAM 1 MG tablet   Commonly known as:  klonoPIN   Dose:  1 mg        Take 1 mg by mouth 2 times daily as needed   Refills:  0        IBUPROFEN PO   Dose:  800 mg        Take 800 mg by mouth every 8 hours as needed   Refills:  0        MULTIVITAMIN ADULT PO        Take by mouth daily   Refills:  0        SUMAtriptan 100 MG tablet   Commonly known as:  IMITREX   Dose:  100 mg   Quantity:  9 tablet        Take 1 tablet (100 mg) by mouth at onset of headache for migraine   Refills:  0        ZANAFLEX PO        Take by mouth every 6 hours as needed for muscle spasms   Refills:  0                Orders Needing Specimen Collection     None      Pending Results     No orders found from 8/25/2018 to  "2018.            Pending Culture Results     No orders found from 2018 to 2018.            Thank you for choosing [facility]       Thank you for choosing [facility] for your care. Our goal is always to provide you with excellent care. Hearing back from our patients is one way we can continue to improve our services. Please take a few minutes to complete the written survey that you may receive in the mail after you visit with us. Thank you!        [employer] Information     [employer] lets you send messages to your doctor, view your test results, renew your prescriptions, schedule appointments and more. To sign up, go to [email or web address] . Click on \"Log in\" on the left side of the screen, which will take you to the Welcome page. Then click on \"Sign up Now\" on the right side of the page.     You will be asked to enter the access code listed below, as well as some personal information. Please follow the directions to create your username and password.     Your access code is: [email or web address]  Expires: 2018  8:04 PM     Your access code will  in 90 days. If you need help or a new code, please call your [facility] clinic or [phone].        Care EveryWhere ID     This is your Care EveryWhere ID. This could be used by other organizations to access your [facility] medical records  [id number]        Equal Access to Services     KING TREVINO : Hadcecilia Magaan, waaxda luqadaha, qaybta kaalmada poli, erik garnica. So [facility] [phone].    ATENCIÓN: Si habla español, tiene a baltazar disposición servicios gratuitos de asistencia lingüística. Nataliia al [phone].    We comply with applicable federal civil rights laws and [place] laws. We do not discriminate on the basis of race, color, national origin, age, disability, sex, sexual orientation, or gender identity.            After Visit Summary       This is your record. Keep this with you and show to your " community pharmacist(s) and doctor(s) at your next visit.

## 2018-08-27 NOTE — ED AVS SNAPSHOT
[facility]    [address] [address]    Phone:  [phone]                                       Venu Amaya   MRN: [id number]    Department:  [facility]   Date of Visit:  8/27/2018           After Visit Summary Signature Page     I have received my discharge instructions, and my questions have been answered. I have discussed any challenges I see with this plan with the nurse or doctor.    ..........................................................................................................................................  Patient/Patient Representative Signature      ..........................................................................................................................................  Patient Representative Print Name and Relationship to Patient    ..................................................               ................................................  Date                                            Time    ..........................................................................................................................................  Reviewed by Signature/Title    ...................................................              ..............................................  Date                                                            Time          22EPIC Rev 08/18

## 2018-08-28 NOTE — ED TRIAGE NOTES
Pt presents today with c/o migraine x3 days. pt Ran out of her home imitrex and would like a shot of imitrex.

## 2018-08-28 NOTE — ED PROVIDER NOTES
History     Chief Complaint   Patient presents with     Headache     The history is provided by the patient. No  was used.     Vneu Amaya is a 43 year old female who presents today with a CC of typical migraine headache.  The headache has been present x 3 days.  She has been using imitrex but ran out yesterday.  She has a long history of migraines.  She reports the symptoms she is experiencing over the past few days are unchanged.  She is following with Dr Hilton,  Neurology, scheduled for botox injections on October 1, has a follow up MRI on Wednesday.  She denies recent head injury, fevers, chills, feeling ill.        Problem List:    Patient Active Problem List    Diagnosis Date Noted     S/P cervical spinal fusion 03/29/2018     Priority: Medium     Neural foraminal stenosis of cervical spine 10/05/2017     Priority: Medium     Opioid dependence (H) 12/23/2015     Priority: Medium     Long term (current) use of opiate analgesic 12/23/2015     Priority: Medium     Chronic low back pain 11/02/2015     Priority: Medium     Overview:   IMO Update       Headache 10/23/2014     Priority: Medium     Overview:   10/23/14: According to neurology consultation of 9/2/14, previous treatments  include pretty much all of the triptans, Maxalt, Imitrex, Amerge, Axert, Frova, and Zomig. She reports that she has tried these when she did not have a constant headache and they did not work when she took them at first onset nor do they work now. She recently tried Imitrex injections and has tried a nasal spray in the past without improvement. The patient does take Compazine for the nausea and reports that that helps somewhat. She has tried numerous preventative medications as well including Topamax, Depakote, gabapentin, Tegretol, Dilantin, nortriptyline, propranolol, and verapamil per her report. She has tried physical therapy as well as trigger injections and denies that any of these things are  helpful. She does report she takes ibuprofen 800 mg twice per day for ankylosing spondylitis. For her headaches, the only thing she has found that helped is Tylenol with Codeine.        Cervicalgia 05/15/2014     Priority: Medium     Myalgia and myositis 05/15/2014     Priority: Medium     Abnormal pap 07/30/2013     Priority: Medium     Ankylosing spondylitis (H) 05/24/2012     Priority: Medium     Flori AN        Dysthymic disorder 09/13/2010     Priority: Medium     Anxiety state 06/02/2009     Priority: Medium     Problem list name updated by automated process. Provider to review       Depressive disorder, not elsewhere classified 10/31/2007     Priority: Medium     Migraine 01/15/2003     Priority: Medium     Problem list name updated by automated process. Provider to review          Past Medical History:    Past Medical History:   Diagnosis Date     Abnormal Pap smear and cervical HPV (human papillo 05/25/2011     Ankylosing spondylitis (H) 05/24/2012     Anxiety state, unspecified 06/02/2009     Cervicalgia pain (neck) 11/03/2011     Chronic Headache disorder  05/25/2011     Depressive disorder, not elsewhere classified 10/31/2007     Dysmenorrhea 04/03/2012     Habitual aborter, antepartum condition or complica 02/10/2005     Inguinal hernia without mention of obstruction or gangrene, unilateral or unspecified, (not specified as recurrent) 06/16/2008     Irregular menstrual cycle 05/25/2011     Mental disorders of mother, postpartum 10/06/2005     Migraine, unspecified, without mention of intractable migraine without mention of status migrainosus 01/15/2003     Moderate dysplasia of cervix 11/03/2011     Pain in thoracic spine 11/03/2011     Scoliosis (and kyphoscoliosis), idiopathic 05/16/2011       Past Surgical History:    Past Surgical History:   Procedure Laterality Date     HEAD & NECK SURGERY       inguinal hernia repair, Germain repair  10/14/2008    RT      TUBAL LIGATION          Family History:    Family History   Problem Relation Age of Onset     Cancer Mother      lymphoma     Cancer Other      Cancer Other      Colon Cancer Sister        Social History:  Marital Status:   [2]  Social History   Substance Use Topics     Smoking status: Former Smoker     Packs/day: 0.00     Types: Cigarettes     Quit date: 7/13/2015     Smokeless tobacco: Never Used      Comment: Passive Exposure: Yes      Alcohol use No        Medications:      acetaminophen-codeine (TYLENOL/CODEINE #3) 300-30 MG per tablet   aspirin-acetaminophen-caffeine (EXCEDRIN MIGRAINE) 250-250-65 MG per tablet   clonazePAM (KLONOPIN) 1 MG tablet   IBUPROFEN PO   Multiple Vitamins-Minerals (MULTIVITAMIN ADULT PO)   SUMAtriptan (IMITREX) 100 MG tablet   TiZANidine HCl (ZANAFLEX PO)   [DISCONTINUED] PRENATAL VITAMINS PO         Review of Systems   Constitutional: Negative for appetite change, chills, fatigue and fever.   Eyes: Negative for photophobia, pain and visual disturbance.   Gastrointestinal: Positive for nausea (mild).   Neurological: Positive for headaches.       Physical Exam   BP: 113/74  Pulse: 86  Temp: 97.5  F (36.4  C)  Resp: 16  SpO2: 100 %      Physical Exam   Constitutional: She is oriented to person, place, and time. She appears well-developed. She is cooperative. She does not appear ill.   HENT:   Head: Normocephalic and atraumatic.   Mouth/Throat: Uvula is midline and oropharynx is clear and moist.   Eyes: Conjunctivae and EOM are normal. Pupils are equal, round, and reactive to light.   Neck: Normal range of motion. Muscular tenderness present.   Cardiovascular: Normal rate and regular rhythm.    Pulmonary/Chest: Effort normal and breath sounds normal.   Musculoskeletal: Normal range of motion.   Neurological: She is alert and oriented to person, place, and time.   Nursing note and vitals reviewed.      ED Course     ED Course     Procedures    Medications   SUMAtriptan (IMITREX) injection 6 mg (6 mg  Subcutaneous Given 8/27/18 1948)     Observed for a minimum of 20 minutes, tolerated medications well, no adverse efects noted, reports pain is improved on discharge.  Assessments & Plan (with Medical Decision Making)     I have reviewed the nursing notes.    I have reviewed the findings, diagnosis, plan and need for follow up with the patient.  ASSESSMENT / PLAN:  (G43.009) Migraine without aura and without status migrainosus, not intractable  Comment: improved with imitrex  Plan:  Continue to follow with neurology and PCP    Return to ED with worsening of symptoms or new concerns        Discharge Medication List as of 8/27/2018  8:04 PM          Final diagnoses:   Migraine without aura and without status migrainosus, not intractable       8/27/2018   [facility]     Kay Betts NP  08/27/18 0854

## 2018-08-28 NOTE — DISCHARGE INSTRUCTIONS
* Migraine Headache  Migraine headaches are related to changes in blood flow to the brain. This causes throbbing or constant pain on one or both sides of the head. The pain may last from a few hours to several days. There is usually nausea, vomiting, sensitivity to light and sound, and blurred vision. A migraine attack may be triggered by emotional stress, hormone changes during the menstrual cycle, oral contraceptives, alcohol use, certain foods containing tyramine, eye strain, weather changes, missing meals, or too little or too much sleep.  Home Care For This Headache:  1) If you were given pain medicine for this headache, do not drive yourself home . Arrange for a ride, instead. When you get home, try to sleep. You should feel much better when you wake up.  2) Migraine headaches may improve with an ice pack on the forehead or at the base of the skull. Heat to the back of your neck may relieve any neck spasm.  3) Drink only clear liquids or eat a very light diet to avoid nausea/vomiting until symptoms improve.  Preventing Future Headaches:  1) Pay attention to those factors that seem to trigger your headache. Try to avoid them when you can. If you have frequent headaches, it is useful to keep a diary of what you were doing, feeling or eating in the hours before each attack. Show this to your doctor to help find the cause of your headaches.  a) If you feel that stress is a factor in your headaches, look at the sources of stress in your life. Find ways to release the build-up of those stresses by using regular exercise, relaxation methods (yoga, meditation), bio-feedback or simply taking time-out for yourself. For more information about this, consult your doctor or go to a local bookstore and review books and tapes on this subject.  b) Tyramine is a substance present in the following foods : chocolate, yogurt, all cheeses except cottage cheese and cream cheese. smoked or pickled fish and meat (including herring,  caviar, bologna, pepperoni, salami), liver, avocados, bananas, figs, raisins, and red wine. Be aware that these foods may trigger a migraine in some persons. Try taking these foods out of your diet for 1-2 months to see if this reduces headache frequency.  Treating Future Attacks:  1) At the first sign of a migraine headache, take a medicine to stop it if one has been prescribed for you. If not, take acetaminophen (Tylenol) or ibuprofen (Motrin, Advil) if you are able to take these. The sooner you take medicine, the better it will work.  2) You may also want to find a quiet, dark, comfortable place to sit or lie down. Let yourself relax or sleep.  3) An ice pack on the forehead or area of greatest pain may also help.   Follow Up  with your doctor if the headache is not better within the next 24 hours. If you have frequent headaches you should discuss a treatment plan with your primary care doctor. Ask if you can have medicine to take at home the next time you get a bad headache. Poorly controlled chronic headaches may require a referral to a neurologist (headache specialist).  Get Prompt Medical Attention  if any of the following occur:    Your head pain gets worse, or does not improve within 24 hours    Repeated vomiting (can t keep liquids down)    Sinus or ear or throat pain (not already reported)    Fever of 101  F (38.3  C) or higher, or as directed by your healthcare provider    Stiff neck    Extreme drowsiness, confusion or fainting    Weakness of an arm or leg or one side of the face    Difficulty with speech or vision    4811-0294 [employer]. [address] [place] [address], [place] [address]. All rights reserved. This information is not intended as a substitute for professional medical care. Always follow your healthcare professional's instructions.  This information has been modified by your health care provider with permission from the publisher.

## 2018-08-30 ENCOUNTER — HOSPITAL ENCOUNTER (EMERGENCY)
Facility: HOSPITAL | Age: 43
Discharge: HOME | End: 2018-08-30
Attending: NURSE PRACTITIONER | Admitting: NURSE PRACTITIONER
Payer: COMMERCIAL

## 2018-08-30 VITALS
HEART RATE: 100 BPM | TEMPERATURE: 98.6 F | DIASTOLIC BLOOD PRESSURE: 76 MMHG | SYSTOLIC BLOOD PRESSURE: 114 MMHG | RESPIRATION RATE: 16 BRPM | OXYGEN SATURATION: 99 %

## 2018-08-30 DIAGNOSIS — G43.719 INTRACTABLE CHRONIC MIGRAINE WITHOUT AURA AND WITHOUT STATUS MIGRAINOSUS: ICD-10-CM

## 2018-08-30 PROCEDURE — 99213 OFFICE O/P EST LOW 20 MIN: CPT | Performed by: NURSE PRACTITIONER

## 2018-08-30 PROCEDURE — 25000128 H RX IP 250 OP 636: Performed by: NURSE PRACTITIONER

## 2018-08-30 PROCEDURE — G0463 HOSPITAL OUTPT CLINIC VISIT: HCPCS

## 2018-08-30 RX ORDER — SUMATRIPTAN 6 MG/.5ML
6 INJECTION, SOLUTION SUBCUTANEOUS ONCE
Status: COMPLETED | OUTPATIENT
Start: 2018-08-30 | End: 2018-08-30

## 2018-08-30 RX ADMIN — SUMATRIPTAN 6 MG: 6 INJECTION SUBCUTANEOUS at 09:51

## 2018-08-30 ASSESSMENT — ENCOUNTER SYMPTOMS
HEMATOLOGIC/LYMPHATIC NEGATIVE: 1
ALLERGIC/IMMUNOLOGIC NEGATIVE: 1
CARDIOVASCULAR NEGATIVE: 1
EYES NEGATIVE: 1
HEADACHES: 1
ENDOCRINE NEGATIVE: 1
PSYCHIATRIC NEGATIVE: 1
GASTROINTESTINAL NEGATIVE: 1
CONSTITUTIONAL NEGATIVE: 1
RESPIRATORY NEGATIVE: 1
MUSCULOSKELETAL NEGATIVE: 1

## 2018-08-30 NOTE — ED PROVIDER NOTES
History     Chief Complaint   Patient presents with     Headache     states she usually just gets immitrex     The history is provided by the patient.     Venu Amaya is a 43 year old female who presents to the  for exacerbation of chronic migraines. Her migraines are more severe with her cycle. She was her a couple days ago and imitrex did help until this morning.     Problem List:    Patient Active Problem List    Diagnosis Date Noted     S/P cervical spinal fusion 03/29/2018     Priority: Medium     Neural foraminal stenosis of cervical spine 10/05/2017     Priority: Medium     Opioid dependence (H) 12/23/2015     Priority: Medium     Long term (current) use of opiate analgesic 12/23/2015     Priority: Medium     Chronic low back pain 11/02/2015     Priority: Medium     Overview:   IMO Update       Headache 10/23/2014     Priority: Medium     Overview:   10/23/14: According to neurology consultation of 9/2/14, previous treatments  include pretty much all of the triptans, Maxalt, Imitrex, Amerge, Axert, Frova, and Zomig. She reports that she has tried these when she did not have a constant headache and they did not work when she took them at first onset nor do they work now. She recently tried Imitrex injections and has tried a nasal spray in the past without improvement. The patient does take Compazine for the nausea and reports that that helps somewhat. She has tried numerous preventative medications as well including Topamax, Depakote, gabapentin, Tegretol, Dilantin, nortriptyline, propranolol, and verapamil per her report. She has tried physical therapy as well as trigger injections and denies that any of these things are helpful. She does report she takes ibuprofen 800 mg twice per day for ankylosing spondylitis. For her headaches, the only thing she has found that helped is Tylenol with Codeine.        Cervicalgia 05/15/2014     Priority: Medium     Myalgia and myositis 05/15/2014     Priority:  Medium     Abnormal pap 07/30/2013     Priority: Medium     Ankylosing spondylitis (H) 05/24/2012     Priority: Medium     Flori AN        Dysthymic disorder 09/13/2010     Priority: Medium     Anxiety state 06/02/2009     Priority: Medium     Problem list name updated by automated process. Provider to review       Depressive disorder, not elsewhere classified 10/31/2007     Priority: Medium     Migraine 01/15/2003     Priority: Medium     Problem list name updated by automated process. Provider to review          Past Medical History:    Past Medical History:   Diagnosis Date     Abnormal Pap smear and cervical HPV (human papillo 05/25/2011     Ankylosing spondylitis (H) 05/24/2012     Anxiety state, unspecified 06/02/2009     Cervicalgia pain (neck) 11/03/2011     Chronic Headache disorder  05/25/2011     Depressive disorder, not elsewhere classified 10/31/2007     Dysmenorrhea 04/03/2012     Habitual aborter, antepartum condition or complica 02/10/2005     Inguinal hernia without mention of obstruction or gangrene, unilateral or unspecified, (not specified as recurrent) 06/16/2008     Irregular menstrual cycle 05/25/2011     Mental disorders of mother, postpartum 10/06/2005     Migraine, unspecified, without mention of intractable migraine without mention of status migrainosus 01/15/2003     Moderate dysplasia of cervix 11/03/2011     Pain in thoracic spine 11/03/2011     Scoliosis (and kyphoscoliosis), idiopathic 05/16/2011       Past Surgical History:    Past Surgical History:   Procedure Laterality Date     HEAD & NECK SURGERY       inguinal hernia repair, Germain repair  10/14/2008    RT      TUBAL LIGATION         Family History:    Family History   Problem Relation Age of Onset     Cancer Mother      lymphoma     Cancer Other      Cancer Other      Colon Cancer Sister        Social History:  Marital Status:   [2]  Social History   Substance Use Topics     Smoking status: Former  Smoker     Packs/day: 0.00     Types: Cigarettes     Quit date: 7/13/2015     Smokeless tobacco: Never Used      Comment: Passive Exposure: Yes      Alcohol use No        Medications:      acetaminophen-codeine (TYLENOL/CODEINE #3) 300-30 MG per tablet   aspirin-acetaminophen-caffeine (EXCEDRIN MIGRAINE) 250-250-65 MG per tablet   clonazePAM (KLONOPIN) 1 MG tablet   IBUPROFEN PO   Multiple Vitamins-Minerals (MULTIVITAMIN ADULT PO)   SUMAtriptan (IMITREX) 100 MG tablet   TiZANidine HCl (ZANAFLEX PO)   [DISCONTINUED] PRENATAL VITAMINS PO         Review of Systems   Constitutional: Negative.    HENT: Negative.    Eyes: Negative.    Respiratory: Negative.    Cardiovascular: Negative.    Gastrointestinal: Negative.    Endocrine: Negative.    Genitourinary: Negative.    Musculoskeletal: Negative.    Skin: Negative.    Allergic/Immunologic: Negative.    Neurological: Positive for headaches.   Hematological: Negative.    Psychiatric/Behavioral: Negative.        Physical Exam   BP: 114/76  Pulse: 100  Resp: 16  SpO2: 99 %      Physical Exam   Constitutional: She is oriented to person, place, and time.   Eyes: Pupils are equal, round, and reactive to light.   Neck: Decreased range of motion present.   Limited ROM due to headache   Cardiovascular: Normal rate and normal heart sounds.    Pulmonary/Chest: Effort normal and breath sounds normal.   Neurological: She is alert and oriented to person, place, and time. No cranial nerve deficit. GCS eye subscore is 4. GCS verbal subscore is 5. GCS motor subscore is 6.   Skin: Skin is warm and dry.   Psychiatric: She has a normal mood and affect.   Nursing note and vitals reviewed.      ED Course     ED Course     Procedures               Critical Care time:  none               No results found for this or any previous visit (from the past 24 hour(s)).    Medications   SUMAtriptan (IMITREX) injection 6 mg (6 mg Subcutaneous Given 8/30/18 0951)       Assessments & Plan (with Medical  Decision Making)     I have reviewed the nursing notes.    I have reviewed the findings, diagnosis, plan and need for follow up with the patient.  Venu given injection for imitrex while in the  today. She states the injection she received 2 days ago helped for a couple day. She states she is near the end of her cycle and the severity of the headaches should decrease. She missed her MRI due to migraine. She is encouraged to call to set up another appointment when her headaches are controlled again. She has an appointment with neurology again 10/1/2018 with a plan for Botox injections. She should follow up with her PCP or return here for any new, consistent or worsening symptoms.  Venu agrees with plan.      New Prescriptions    No medications on file       Final diagnoses:   Intractable chronic migraine without aura and without status migrainosus       8/30/2018   [facility]     Nona Ahn APRN CNP  08/30/18 0959

## 2018-08-30 NOTE — ED AVS SNAPSHOT
[facility]    [address] [address]    Phone:  [phone]                                       Venu Amaya   MRN: [id number]    Department:  [facility]   Date of Visit:  8/30/2018           After Visit Summary Signature Page     I have received my discharge instructions, and my questions have been answered. I have discussed any challenges I see with this plan with the nurse or doctor.    ..........................................................................................................................................  Patient/Patient Representative Signature      ..........................................................................................................................................  Patient Representative Print Name and Relationship to Patient    ..................................................               ................................................  Date                                            Time    ..........................................................................................................................................  Reviewed by Signature/Title    ...................................................              ..............................................  Date                                                            Time          22EPIC Rev 08/18

## 2018-08-30 NOTE — ED TRIAGE NOTES
Pt presents today with c/o migraine. Pt has hx of migraines and ran out of her home imitrex. Pt requesting imitrex shot.

## 2018-08-30 NOTE — DISCHARGE INSTRUCTIONS
* Migraine Headache  Migraine headaches are related to changes in blood flow to the brain. This causes throbbing or constant pain on one or both sides of the head. The pain may last from a few hours to several days. There is usually nausea, vomiting, sensitivity to light and sound, and blurred vision. A migraine attack may be triggered by emotional stress, hormone changes during the menstrual cycle, oral contraceptives, alcohol use, certain foods containing tyramine, eye strain, weather changes, missing meals, or too little or too much sleep.  Home Care For This Headache:  1) If you were given pain medicine for this headache, do not drive yourself home . Arrange for a ride, instead. When you get home, try to sleep. You should feel much better when you wake up.  2) Migraine headaches may improve with an ice pack on the forehead or at the base of the skull. Heat to the back of your neck may relieve any neck spasm.  3) Drink only clear liquids or eat a very light diet to avoid nausea/vomiting until symptoms improve.  Preventing Future Headaches:  1) Pay attention to those factors that seem to trigger your headache. Try to avoid them when you can. If you have frequent headaches, it is useful to keep a diary of what you were doing, feeling or eating in the hours before each attack. Show this to your doctor to help find the cause of your headaches.  a) If you feel that stress is a factor in your headaches, look at the sources of stress in your life. Find ways to release the build-up of those stresses by using regular exercise, relaxation methods (yoga, meditation), bio-feedback or simply taking time-out for yourself. For more information about this, consult your doctor or go to a local bookstore and review books and tapes on this subject.  b) Tyramine is a substance present in the following foods : chocolate, yogurt, all cheeses except cottage cheese and cream cheese. smoked or pickled fish and meat (including herring,  caviar, bologna, pepperoni, salami), liver, avocados, bananas, figs, raisins, and red wine. Be aware that these foods may trigger a migraine in some persons. Try taking these foods out of your diet for 1-2 months to see if this reduces headache frequency.  Treating Future Attacks:  1) At the first sign of a migraine headache, take a medicine to stop it if one has been prescribed for you. If not, take acetaminophen (Tylenol) or ibuprofen (Motrin, Advil) if you are able to take these. The sooner you take medicine, the better it will work.  2) You may also want to find a quiet, dark, comfortable place to sit or lie down. Let yourself relax or sleep.  3) An ice pack on the forehead or area of greatest pain may also help.   Follow Up  with your doctor if the headache is not better within the next 24 hours. If you have frequent headaches you should discuss a treatment plan with your primary care doctor. Ask if you can have medicine to take at home the next time you get a bad headache. Poorly controlled chronic headaches may require a referral to a neurologist (headache specialist).  Get Prompt Medical Attention  if any of the following occur:    Your head pain gets worse, or does not improve within 24 hours    Repeated vomiting (can t keep liquids down)    Sinus or ear or throat pain (not already reported)    Fever of 101  F (38.3  C) or higher, or as directed by your healthcare provider    Stiff neck    Extreme drowsiness, confusion or fainting    Weakness of an arm or leg or one side of the face    Difficulty with speech or vision    3796-9042 [employer]. [address] [place] [address], [place] [address]. All rights reserved. This information is not intended as a substitute for professional medical care. Always follow your healthcare professional's instructions.  This information has been modified by your health care provider with permission from the publisher.

## 2018-08-30 NOTE — ED AVS SNAPSHOT
[facility]    [address]    [facility] [address]    Phone:  [phone]                                       Venu Amaya   MRN: [id number]    Department:  [facility]   Date of Visit:  8/30/2018           Patient Information     Date Of Birth          1975        Your diagnoses for this visit were:     Intractable chronic migraine without aura and without status migrainosus        You were seen by Nona Ahn APRN CNP.      Follow-up Information     Follow up with Addi Hilton MD.    Why:  for migraines as scheduled    Contact information:    [facility]  [address] [facility] [address] [phone]          Follow up with [facility].    Specialty:  EMERGENCY MEDICINE    Why:  If symptoms worsen    Contact information:    [address]  [facility] [address] [phone]    Additional information:    From [facility]: Take [address]. Turn left at [address]. Turn left at the first stoplight on [address]. At the first stop sign, take a right onto [address]. Take a left into the parking lot and continue through until you reach the North enterance of the building.       From [place]: Take [address]. Take the [address] ramp towards [place]. Turn left onto [address]. Take a slight right onto [address]. Turn left at the first stoplight on [address]. At the first stop sign, take a right onto [address]. Take a left into the parking lot and continue through until you reach the North enterance of the building.       From [place]: Take [address]. Take a right at [address]. At the first stop sign, take a right onto [address]. Take a left into the parking lot and continue through until you reach the North enterance of the building.         Follow up with Robin Segovia MD.    Specialty:  Family Practice    Why:  If symptoms worsen,  As needed    Contact information:    [facility]  [address] [facility] [address]  [phone]          Discharge Instructions         * Migraine Headache  Migraine headaches are related to changes in blood flow to the brain. This causes throbbing or constant pain on one or both sides of the head. The pain may last from a few hours to several days. There is usually nausea, vomiting, sensitivity to light and sound, and blurred vision. A migraine attack may be triggered by emotional stress, hormone changes during the menstrual cycle, oral contraceptives, alcohol use, certain foods containing tyramine, eye strain, weather changes, missing meals, or too little or too much sleep.  Home Care For This Headache:  1) If you were given pain medicine for this headache, do not drive yourself home . Arrange for a ride, instead. When you get home, try to sleep. You should feel much better when you wake up.  2) Migraine headaches may improve with an ice pack on the forehead or at the base of the skull. Heat to the back of your neck may relieve any neck spasm.  3) Drink only clear liquids or eat a very light diet to avoid nausea/vomiting until symptoms improve.  Preventing Future Headaches:  1) Pay attention to those factors that seem to trigger your headache. Try to avoid them when you can. If you have frequent headaches, it is useful to keep a diary of what you were doing, feeling or eating in the hours before each attack. Show this to your doctor to help find the cause of your headaches.  a) If you feel that stress is a factor in your headaches, look at the sources of stress in your life. Find ways to release the build-up of those stresses by using regular exercise, relaxation methods (yoga, meditation), bio-feedback or simply taking time-out for yourself. For more information about this, consult your doctor or go to a local bookstore and review books and tapes on this subject.  b) Tyramine is a substance present in the  following foods : chocolate, yogurt, all cheeses except cottage cheese and cream cheese. smoked or pickled fish and meat (including herring, caviar, bologna, pepperoni, salami), liver, avocados, bananas, figs, raisins, and red wine. Be aware that these foods may trigger a migraine in some persons. Try taking these foods out of your diet for 1-2 months to see if this reduces headache frequency.  Treating Future Attacks:  1) At the first sign of a migraine headache, take a medicine to stop it if one has been prescribed for you. If not, take acetaminophen (Tylenol) or ibuprofen (Motrin, Advil) if you are able to take these. The sooner you take medicine, the better it will work.  2) You may also want to find a quiet, dark, comfortable place to sit or lie down. Let yourself relax or sleep.  3) An ice pack on the forehead or area of greatest pain may also help.   Follow Up  with your doctor if the headache is not better within the next 24 hours. If you have frequent headaches you should discuss a treatment plan with your primary care doctor. Ask if you can have medicine to take at home the next time you get a bad headache. Poorly controlled chronic headaches may require a referral to a neurologist (headache specialist).  Get Prompt Medical Attention  if any of the following occur:    Your head pain gets worse, or does not improve within 24 hours    Repeated vomiting (can t keep liquids down)    Sinus or ear or throat pain (not already reported)    Fever of 101  F (38.3  C) or higher, or as directed by your healthcare provider    Stiff neck    Extreme drowsiness, confusion or fainting    Weakness of an arm or leg or one side of the face    Difficulty with speech or vision    3889-7114 [employer]. [address] [place] [address], [place] [address]. All rights reserved. This information is not intended as a substitute for professional medical care. Always follow your healthcare professional's instructions.  This  information has been modified by your health care provider with permission from the publisher.             Review of your medicines      Our records show that you are taking the medicines listed below. If these are incorrect, please call your family doctor or clinic.        Dose / Directions Last dose taken    acetaminophen-codeine 300-30 MG per tablet   Commonly known as:  TYLENOL WITH CODEINE #3   Quantity:  60 tablet        1-2 tabs up to twice daily as needed.   Refills:  0        aspirin-acetaminophen-caffeine 250-250-65 MG per tablet   Commonly known as:  EXCEDRIN MIGRAINE   Dose:  1 tablet        Take 1 tablet by mouth every 6 hours as needed for headaches   Refills:  0        clonazePAM 1 MG tablet   Commonly known as:  klonoPIN   Dose:  1 mg        Take 1 mg by mouth 2 times daily as needed   Refills:  0        IBUPROFEN PO   Dose:  800 mg        Take 800 mg by mouth every 8 hours as needed   Refills:  0        MULTIVITAMIN ADULT PO        Take by mouth daily   Refills:  0        SUMAtriptan 100 MG tablet   Commonly known as:  IMITREX   Dose:  100 mg   Quantity:  9 tablet        Take 1 tablet (100 mg) by mouth at onset of headache for migraine   Refills:  0        ZANAFLEX PO        Take by mouth every 6 hours as needed for muscle spasms   Refills:  0                Orders Needing Specimen Collection     None      Pending Results     No orders found from 8/28/2018 to 8/31/2018.            Pending Culture Results     No orders found from 8/28/2018 to 8/31/2018.            Thank you for choosing [facility]       Thank you for choosing [facility] for your care. Our goal is always to provide you with excellent care. Hearing back from our patients is one way we can continue to improve our services. Please take a few minutes to complete the written survey that you may receive in the mail after you visit with us. Thank you!        mValenthart Information     [employer] lets you send messages to your doctor, view your test  "results, renew your prescriptions, schedule appointments and more. To sign up, go to [email or web address] . Click on \"Log in\" on the left side of the screen, which will take you to the Welcome page. Then click on \"Sign up Now\" on the right side of the page.     You will be asked to enter the access code listed below, as well as some personal information. Please follow the directions to create your username and password.     Your access code is: [email or web address]  Expires: 2018  8:04 PM     Your access code will  in 90 days. If you need help or a new code, please call your [facility] clinic or [phone].        Care EveryWhere ID     This is your Care EveryWhere ID. This could be used by other organizations to access your [facility] medical records  [id number]        Equal Access to Services     KING TREVINO : Buddy Magana, lukas ramsey, caroline josealjosé antonio ashton, erik duenas . So [facility] [phone].    ATENCIÓN: Si habla español, tiene a baltazar disposición servicios gratuitos de asistencia lingüística. Llame al [phone].    We comply with applicable federal civil rights laws and [place] laws. We do not discriminate on the basis of race, color, national origin, age, disability, sex, sexual orientation, or gender identity.            After Visit Summary       This is your record. Keep this with you and show to your community pharmacist(s) and doctor(s) at your next visit.                  "

## 2018-08-31 ENCOUNTER — HOSPITAL ENCOUNTER (EMERGENCY)
Facility: HOSPITAL | Age: 43
Discharge: HOME | End: 2018-08-31
Attending: INTERNAL MEDICINE | Admitting: INTERNAL MEDICINE
Payer: COMMERCIAL

## 2018-08-31 VITALS
TEMPERATURE: 98.1 F | SYSTOLIC BLOOD PRESSURE: 105 MMHG | RESPIRATION RATE: 16 BRPM | OXYGEN SATURATION: 100 % | DIASTOLIC BLOOD PRESSURE: 71 MMHG

## 2018-08-31 DIAGNOSIS — G43.909 MIGRAINE WITHOUT STATUS MIGRAINOSUS, NOT INTRACTABLE, UNSPECIFIED MIGRAINE TYPE: ICD-10-CM

## 2018-08-31 PROCEDURE — 25000128 H RX IP 250 OP 636: Performed by: INTERNAL MEDICINE

## 2018-08-31 PROCEDURE — 99283 EMERGENCY DEPT VISIT LOW MDM: CPT | Performed by: INTERNAL MEDICINE

## 2018-08-31 PROCEDURE — 99283 EMERGENCY DEPT VISIT LOW MDM: CPT

## 2018-08-31 RX ORDER — SUMATRIPTAN 6 MG/.5ML
6 INJECTION, SOLUTION SUBCUTANEOUS ONCE
Status: COMPLETED | OUTPATIENT
Start: 2018-08-31 | End: 2018-08-31

## 2018-08-31 RX ORDER — SUMATRIPTAN 100 MG/1
100 TABLET, FILM COATED ORAL
Qty: 4 TABLET | Refills: 0 | Status: SHIPPED | OUTPATIENT
Start: 2018-08-31 | End: 2018-11-09

## 2018-08-31 RX ORDER — SUMATRIPTAN 100 MG/1
100 TABLET, FILM COATED ORAL
Qty: 30 TABLET | Refills: 0 | Status: SHIPPED | OUTPATIENT
Start: 2018-08-31

## 2018-08-31 RX ADMIN — SUMATRIPTAN 6 MG: 6 INJECTION SUBCUTANEOUS at 07:03

## 2018-08-31 NOTE — DISCHARGE INSTRUCTIONS
What Are Migraine and Tension Headaches?    Although there are several types of headaches, migraine and tension headaches affect the most people. When you have a headache, it isn't your brain that's hurting. Your head aches because nerves in the bones, blood vessels, meninges, and muscles of your head are irritated. These irritated nerves send pain signals to the brain, which identifies where you hurt and how bad the pain is.  Talk with your healthcare provider about a treatment plan that may help relieve pain and prevent future headaches.   What causes your headache?  The actual headache process is not yet understood. Only rarely are headaches a sign of a serious medical problem such as a tumor. Headache pain may be caused by abnormal interaction between the brain and the nerves and blood vessels in the head. A previous head injury or concussion, neck pain, environmental stresses, muscle tension, anxiety, depression, fatigue, skipping meals, or certain foods and drinks may trigger headache pain.  Brain scans are rarely needed and only for certain danger sign symptoms. CT scans are associated with potential radiation effects and potential inaccurate false findings.  What is referred pain?  Headache pain can be referred pain, which is pain that has its source in one place but is felt in another. For example, pain behind the eyes may actually be caused by tense muscles in the neck and shoulders. This means that the place that hurts may not be the part of the body that needs treatment.  Is it a migraine?  Migraine is a vascular headache that causes throbbing pain felt on one (most common) or both sides (less common) of the head. You may feel nauseated or vomit. This headache may also be preceded or associated with changes in sight (like seeing spots or flashes of light), ability to speak, or sensation (aura). There are a wide variety of environmental and food-related triggers for migraines. The pain may last for 4 to  "72 hours. Afterward, you may feel shaky for a day or so. If this is the first time you experience these symptoms, you should immediately seek medical attention because you could be having a stroke.  Is it a tension headache?  This type of headache is usually a dull ache or a sensation of pressure on both sides of the head. It may be associated with pain or tension in the neck and shoulders. Depression, anxiety, and stress can cause a tension headache. The pain may not have a definite beginning or end. It may come and go, or seem never to go away.  When to call the healthcare provider  Call your healthcare provider for headaches that happen along with any of these symptoms:    Sudden, severe headache that is different from your usual headache pain    Headache associated with fever    Sudden headache associated with stiff neck    Slurred speech    Recurring headache in children     Ongoing numbness or muscle weakness    Loss of vision    Pain following a head injury    Convulsions, or a change in mental awareness    A headache you would call \"the worst headache you've ever had\"    New headaches in a pregnant woman   Date Last Reviewed: 1/1/2018 2000-2017 [employer]. [address] [place] [address], [place] [address]. All rights reserved. This information is not intended as a substitute for professional medical care. Always follow your healthcare professional's instructions.        "

## 2018-08-31 NOTE — ED AVS SNAPSHOT
[facility]    [address] [address]    Phone:  [phone]                                       Venu Amaya   MRN: [id number]    Department:  [facility]   Date of Visit:  8/31/2018           After Visit Summary Signature Page     I have received my discharge instructions, and my questions have been answered. I have discussed any challenges I see with this plan with the nurse or doctor.    ..........................................................................................................................................  Patient/Patient Representative Signature      ..........................................................................................................................................  Patient Representative Print Name and Relationship to Patient    ..................................................               ................................................  Date                                            Time    ..........................................................................................................................................  Reviewed by Signature/Title    ...................................................              ..............................................  Date                                                            Time          22EPIC Rev 08/18

## 2018-08-31 NOTE — ED AVS SNAPSHOT
[facility]    [address] [address]    Phone:  [phone]                                       Venu Amaya   MRN: [id number]    Department:  [facility]   Date of Visit:  8/31/2018           Patient Information     Date Of Birth          1975        Your diagnoses for this visit were:     Migraine without status migrainosus, not intractable, unspecified migraine type        You were seen by Abdoulaye Garcia MD.      Follow-up Information     Schedule an appointment as soon as possible for a visit with Robin Segovia MD.    Specialty:  Family Practice    Contact information:    [facility]  [address] [place] [id number] [phone]          Discharge Instructions         What Are Migraine and Tension Headaches?    Although there are several types of headaches, migraine and tension headaches affect the most people. When you have a headache, it isn't your brain that's hurting. Your head aches because nerves in the bones, blood vessels, meninges, and muscles of your head are irritated. These irritated nerves send pain signals to the brain, which identifies where you hurt and how bad the pain is.  Talk with your healthcare provider about a treatment plan that may help relieve pain and prevent future headaches.   What causes your headache?  The actual headache process is not yet understood. Only rarely are headaches a sign of a serious medical problem such as a tumor. Headache pain may be caused by abnormal interaction between the brain and the nerves and blood vessels in the head. A previous head injury or concussion, neck pain, environmental stresses, muscle tension, anxiety, depression, fatigue, skipping meals, or certain foods and drinks may trigger headache pain.  Brain scans are rarely needed and only for certain danger sign symptoms. CT scans are associated with potential radiation effects and potential inaccurate false findings.  What is referred  "pain?  Headache pain can be referred pain, which is pain that has its source in one place but is felt in another. For example, pain behind the eyes may actually be caused by tense muscles in the neck and shoulders. This means that the place that hurts may not be the part of the body that needs treatment.  Is it a migraine?  Migraine is a vascular headache that causes throbbing pain felt on one (most common) or both sides (less common) of the head. You may feel nauseated or vomit. This headache may also be preceded or associated with changes in sight (like seeing spots or flashes of light), ability to speak, or sensation (aura). There are a wide variety of environmental and food-related triggers for migraines. The pain may last for 4 to 72 hours. Afterward, you may feel shaky for a day or so. If this is the first time you experience these symptoms, you should immediately seek medical attention because you could be having a stroke.  Is it a tension headache?  This type of headache is usually a dull ache or a sensation of pressure on both sides of the head. It may be associated with pain or tension in the neck and shoulders. Depression, anxiety, and stress can cause a tension headache. The pain may not have a definite beginning or end. It may come and go, or seem never to go away.  When to call the healthcare provider  Call your healthcare provider for headaches that happen along with any of these symptoms:    Sudden, severe headache that is different from your usual headache pain    Headache associated with fever    Sudden headache associated with stiff neck    Slurred speech    Recurring headache in children     Ongoing numbness or muscle weakness    Loss of vision    Pain following a head injury    Convulsions, or a change in mental awareness    A headache you would call \"the worst headache you've ever had\"    New headaches in a pregnant woman   Date Last Reviewed: 1/1/2018 2000-2017 [employer] " [address] [place] [address]. All rights reserved. This information is not intended as a substitute for professional medical care. Always follow your healthcare professional's instructions.             Review of your medicines      CONTINUE these medicines which may have CHANGED, or have new prescriptions. If we are uncertain of the size of tablets/capsules you have at home, strength may be listed as something that might have changed.        Dose / Directions Last dose taken    * SUMAtriptan 100 MG tablet   Commonly known as:  IMITREX   Dose:  100 mg   What changed:  You were already taking a medication with the same name, and this prescription was added. Make sure you understand how and when to take each.   Quantity:  30 tablet        Take 1 tablet (100 mg) by mouth at onset of headache for migraine   Refills:  0        * SUMAtriptan 100 MG tablet   Commonly known as:  IMITREX   Dose:  100 mg   What changed:  Another medication with the same name was added. Make sure you understand how and when to take each.   Quantity:  4 tablet        Take 1 tablet (100 mg) by mouth at onset of headache for migraine   Refills:  0        * Notice:  This list has 2 medication(s) that are the same as other medications prescribed for you. Read the directions carefully, and ask your doctor or other care provider to review them with you.      Our records show that you are taking the medicines listed below. If these are incorrect, please call your family doctor or clinic.        Dose / Directions Last dose taken    acetaminophen-codeine 300-30 MG per tablet   Commonly known as:  TYLENOL WITH CODEINE #3   Quantity:  60 tablet        1-2 tabs up to twice daily as needed.   Refills:  0        aspirin-acetaminophen-caffeine 250-250-65 MG per tablet   Commonly known as:  EXCEDRIN MIGRAINE   Dose:  1 tablet        Take 1 tablet by mouth every 6 hours as needed for headaches   Refills:  0        clonazePAM 1 MG tablet   Commonly  "known as:  klonoPIN   Dose:  1 mg        Take 1 mg by mouth 2 times daily as needed   Refills:  0        IBUPROFEN PO   Dose:  800 mg        Take 800 mg by mouth every 8 hours as needed   Refills:  0        MULTIVITAMIN ADULT PO        Take by mouth daily   Refills:  0        ZANAFLEX PO        Take by mouth every 6 hours as needed for muscle spasms   Refills:  0                Prescriptions were sent or printed at these locations (2 Prescriptions)                   [facility] [address] [place] [address], [place] - [address] AT [facility] [address], [place] [address]    Telephone:  [phone]   Fax:  [phone]   Hours:                  Printed at Department/Unit printer (2 of 2)         SUMAtriptan (IMITREX) 100 MG tablet               SUMAtriptan (IMITREX) 100 MG tablet                Orders Needing Specimen Collection     None      Pending Results     No orders found from 8/29/2018 to 9/1/2018.            Pending Culture Results     No orders found from 8/29/2018 to 9/1/2018.            Thank you for choosing [facility]       Thank you for choosing [facility] for your care. Our goal is always to provide you with excellent care. Hearing back from our patients is one way we can continue to improve our services. Please take a few minutes to complete the written survey that you may receive in the mail after you visit with us. Thank you!        [employer] Information     [employer] lets you send messages to your doctor, view your test results, renew your prescriptions, schedule appointments and more. To sign up, go to [email or web address] . Click on \"Log in\" on the left side of the screen, which will take you to the Welcome page. Then click on \"Sign up Now\" on the right side of the page.     You will be asked to enter the access code listed below, as well as some personal information. Please follow the directions to create your username and password.     Your access code is: [email or web address]  Expires: " 2018  8:04 PM     Your access code will  in 90 days. If you need help or a new code, please call your [facility] clinic or [phone].        Care EveryWhere ID     This is your Care EveryWhere ID. This could be used by other organizations to access your [facility] medical records  [id number]        Equal Access to Services     KING TREVINO : Buddy Magana, lukas ramsey, caroline ashton, erik duenas . So [facility] [phone].    ATENCIÓN: Si habla español, tiene a baltazar disposición servicios gratuitos de asistencia lingüística. Llame al [phone].    We comply with applicable federal civil rights laws and [place] laws. We do not discriminate on the basis of race, color, national origin, age, disability, sex, sexual orientation, or gender identity.            After Visit Summary       This is your record. Keep this with you and show to your community pharmacist(s) and doctor(s) at your next visit.

## 2018-08-31 NOTE — ED NOTES
"Patient presents with a headache that she has had for 7 days.  States when she has her \"cycles\" her headaches are bad.  States she usually takes Imetrex and that works well, but she is out.  Patient was seen in the ED yesterday.  Patient is awake/alert and interactive.  C/o light sensitivity; lights dimmed and warm blanket given for comfort.  "

## 2018-09-04 ASSESSMENT — ENCOUNTER SYMPTOMS
HEADACHES: 1
ABDOMINAL PAIN: 0
DIFFICULTY URINATING: 0
NECK STIFFNESS: 0
COLOR CHANGE: 0
SHORTNESS OF BREATH: 0
ARTHRALGIAS: 0
EYE REDNESS: 0
CONFUSION: 0
FEVER: 0

## 2018-09-04 NOTE — ED PROVIDER NOTES
History     Chief Complaint   Patient presents with     Headache     Patient is a 43 year old female presenting with headaches. The history is provided by the patient.   Headache   Pain location:  Generalized  Quality:  Stabbing  Severity currently:  7/10  Onset quality:  Gradual  Timing:  Constant  Chronicity:  Recurrent  Associated symptoms: no abdominal pain, no congestion, no fever and no neck stiffness          Problem List:    Patient Active Problem List    Diagnosis Date Noted     S/P cervical spinal fusion 03/29/2018     Priority: Medium     Neural foraminal stenosis of cervical spine 10/05/2017     Priority: Medium     Opioid dependence (H) 12/23/2015     Priority: Medium     Long term (current) use of opiate analgesic 12/23/2015     Priority: Medium     Chronic low back pain 11/02/2015     Priority: Medium     Overview:   IMO Update       Headache 10/23/2014     Priority: Medium     Overview:   10/23/14: According to neurology consultation of 9/2/14, previous treatments  include pretty much all of the triptans, Maxalt, Imitrex, Amerge, Axert, Frova, and Zomig. She reports that she has tried these when she did not have a constant headache and they did not work when she took them at first onset nor do they work now. She recently tried Imitrex injections and has tried a nasal spray in the past without improvement. The patient does take Compazine for the nausea and reports that that helps somewhat. She has tried numerous preventative medications as well including Topamax, Depakote, gabapentin, Tegretol, Dilantin, nortriptyline, propranolol, and verapamil per her report. She has tried physical therapy as well as trigger injections and denies that any of these things are helpful. She does report she takes ibuprofen 800 mg twice per day for ankylosing spondylitis. For her headaches, the only thing she has found that helped is Tylenol with Codeine.        Cervicalgia 05/15/2014     Priority: Medium     Myalgia  and myositis 05/15/2014     Priority: Medium     Abnormal pap 07/30/2013     Priority: Medium     Ankylosing spondylitis (H) 05/24/2012     Priority: Medium     Flori AN        Dysthymic disorder 09/13/2010     Priority: Medium     Anxiety state 06/02/2009     Priority: Medium     Problem list name updated by automated process. Provider to review       Depressive disorder, not elsewhere classified 10/31/2007     Priority: Medium     Migraine 01/15/2003     Priority: Medium     Problem list name updated by automated process. Provider to review          Past Medical History:    Past Medical History:   Diagnosis Date     Abnormal Pap smear and cervical HPV (human papillo 05/25/2011     Ankylosing spondylitis (H) 05/24/2012     Anxiety state, unspecified 06/02/2009     Cervicalgia pain (neck) 11/03/2011     Chronic Headache disorder  05/25/2011     Depressive disorder, not elsewhere classified 10/31/2007     Dysmenorrhea 04/03/2012     Habitual aborter, antepartum condition or complica 02/10/2005     Inguinal hernia without mention of obstruction or gangrene, unilateral or unspecified, (not specified as recurrent) 06/16/2008     Irregular menstrual cycle 05/25/2011     Mental disorders of mother, postpartum 10/06/2005     Migraine, unspecified, without mention of intractable migraine without mention of status migrainosus 01/15/2003     Moderate dysplasia of cervix 11/03/2011     Pain in thoracic spine 11/03/2011     Scoliosis (and kyphoscoliosis), idiopathic 05/16/2011       Past Surgical History:    Past Surgical History:   Procedure Laterality Date     HEAD & NECK SURGERY       inguinal hernia repair, Germain repair  10/14/2008    RT      TUBAL LIGATION         Family History:    Family History   Problem Relation Age of Onset     Cancer Mother      lymphoma     Cancer Other      Cancer Other      Colon Cancer Sister        Social History:  Marital Status:   [2]  Social History   Substance Use  Topics     Smoking status: Current Some Day Smoker     Packs/day: 0.00     Types: Cigarettes     Last attempt to quit: 7/13/2015     Smokeless tobacco: Never Used      Comment: Passive Exposure: Yes      Alcohol use No        Medications:      acetaminophen-codeine (TYLENOL/CODEINE #3) 300-30 MG per tablet   aspirin-acetaminophen-caffeine (EXCEDRIN MIGRAINE) 250-250-65 MG per tablet   clonazePAM (KLONOPIN) 1 MG tablet   IBUPROFEN PO   Multiple Vitamins-Minerals (MULTIVITAMIN ADULT PO)   SUMAtriptan (IMITREX) 100 MG tablet   SUMAtriptan (IMITREX) 100 MG tablet   TiZANidine HCl (ZANAFLEX PO)   [DISCONTINUED] PRENATAL VITAMINS PO         Review of Systems   Constitutional: Negative for fever.   HENT: Negative for congestion.    Eyes: Negative for redness.   Respiratory: Negative for shortness of breath.    Cardiovascular: Negative for chest pain.   Gastrointestinal: Negative for abdominal pain.   Genitourinary: Negative for difficulty urinating.   Musculoskeletal: Negative for arthralgias and neck stiffness.   Skin: Negative for color change.   Neurological: Positive for headaches.   Psychiatric/Behavioral: Negative for confusion.       Physical Exam   BP: 105/71  Heart Rate: 90  Temp: 98.1  F (36.7  C)  Resp: 16  SpO2: 100 %      Physical Exam   Constitutional: No distress.   HENT:   Head: Atraumatic.   Mouth/Throat: Oropharynx is clear and moist. No oropharyngeal exudate.   Eyes: Pupils are equal, round, and reactive to light. No scleral icterus.   Cardiovascular: Normal heart sounds and intact distal pulses.    Pulmonary/Chest: Breath sounds normal. No respiratory distress.   Abdominal: Soft. Bowel sounds are normal. There is no tenderness.   Musculoskeletal: She exhibits no edema or tenderness.   Skin: Skin is warm. No rash noted. She is not diaphoretic.       ED Course     ED Course     Procedures           No results found for this or any previous visit (from the past 24 hour(s)).    Medications   SUMAtriptan  (IMITREX) injection 6 mg (6 mg Subcutaneous Given 8/31/18 0703)       Assessments & Plan (with Medical Decision Making)   Migraine headache  Responded to imitrex injection, completely resoved    Dc home  I have reviewed the nursing notes.    I have reviewed the findings, diagnosis, plan and need for follow up with the patient.      Discharge Medication List as of 8/31/2018  7:14 AM          Final diagnoses:   Migraine without status migrainosus, not intractable, unspecified migraine type       8/31/2018   [facility]     Abdoulaye Garcia MD  09/04/18 0322

## 2018-10-09 NOTE — ED NOTES
"Pt presents on her phone texting as she walks in the room. Declined to put gown on. States \"I am just here to get a shot of imitrex.\"  "
In to discharge pt. Room empty. Pt left without written discharge instructions.  
Migraine h/a that started a few days ago. States has alittle nausea but no vomiting.  
Room light turned off. Offered ice pack for headache. Pt declined.  
States she just wants an imitrex injection. States she has imitrex prescribed but cannot refill her prescription till 5/10/18. She has used her 9 tablets that she can get each month. States was in  Saturday for imitrex. Sx returned.   
no

## 2018-10-10 ENCOUNTER — HOSPITAL ENCOUNTER (EMERGENCY)
Facility: HOSPITAL | Age: 43
Discharge: HOME | End: 2018-10-10
Admitting: FAMILY MEDICINE
Payer: COMMERCIAL

## 2018-10-10 VITALS
HEART RATE: 98 BPM | DIASTOLIC BLOOD PRESSURE: 68 MMHG | OXYGEN SATURATION: 100 % | RESPIRATION RATE: 18 BRPM | TEMPERATURE: 97.6 F | SYSTOLIC BLOOD PRESSURE: 104 MMHG

## 2018-10-10 DIAGNOSIS — G43.909 MIGRAINE WITHOUT STATUS MIGRAINOSUS, NOT INTRACTABLE, UNSPECIFIED MIGRAINE TYPE: ICD-10-CM

## 2018-10-10 PROCEDURE — 99284 EMERGENCY DEPT VISIT MOD MDM: CPT | Mod: Z6 | Performed by: FAMILY MEDICINE

## 2018-10-10 PROCEDURE — 25000128 H RX IP 250 OP 636: Performed by: FAMILY MEDICINE

## 2018-10-10 PROCEDURE — 99284 EMERGENCY DEPT VISIT MOD MDM: CPT

## 2018-10-10 RX ORDER — SUMATRIPTAN 6 MG/.5ML
6 INJECTION, SOLUTION SUBCUTANEOUS ONCE
Status: COMPLETED | OUTPATIENT
Start: 2018-10-10 | End: 2018-10-10

## 2018-10-10 RX ADMIN — SUMATRIPTAN 6 MG: 6 INJECTION SUBCUTANEOUS at 05:05

## 2018-10-10 ASSESSMENT — ENCOUNTER SYMPTOMS
PHOTOPHOBIA: 1
CONSTITUTIONAL NEGATIVE: 1
MUSCULOSKELETAL NEGATIVE: 1
RESPIRATORY NEGATIVE: 1
NAUSEA: 1
PSYCHIATRIC NEGATIVE: 1
HEADACHES: 1
VOMITING: 0

## 2018-10-10 NOTE — ED NOTES
Patient states she developed a headache last night and this is similar to her migraines.  States she took Tylenol #3 at 0300 with no relief.  States she hasn't refilled her Imitrex so she wasn't able to take that. Denies nausea or vomiting.

## 2018-10-10 NOTE — ED AVS SNAPSHOT
[facility]    [address] [address]    Phone:  [phone]                                       Venu Amaya   MRN: [id number]    Department:  [facility]   Date of Visit:  10/10/2018           Patient Information     Date Of Birth          1975        Your diagnoses for this visit were:     Migraine without status migrainosus, not intractable, unspecified migraine type       Follow-up Information     Follow up with Robin Segovia MD.    Specialty:  Family Practice    Contact information:    [facility]  [address] [facility] [address]  [phone]          Discharge Instructions         * Migraine Headache  Migraine headaches are related to changes in blood flow to the brain. This causes throbbing or constant pain on one or both sides of the head. The pain may last from a few hours to several days. There is usually nausea, vomiting, sensitivity to light and sound, and blurred vision. A migraine attack may be triggered by emotional stress, hormone changes during the menstrual cycle, oral contraceptives, alcohol use, certain foods containing tyramine, eye strain, weather changes, missing meals, or too little or too much sleep.  Home Care For This Headache:  1) If you were given pain medicine for this headache, do not drive yourself home . Arrange for a ride, instead. When you get home, try to sleep. You should feel much better when you wake up.  2) Migraine headaches may improve with an ice pack on the forehead or at the base of the skull. Heat to the back of your neck may relieve any neck spasm.  3) Drink only clear liquids or eat a very light diet to avoid nausea/vomiting until symptoms improve.  Preventing Future Headaches:  1) Pay attention to those factors that seem to trigger your headache. Try to avoid them when you can. If you have frequent headaches, it is useful to keep a diary of what you were doing, feeling or eating in the hours before  each attack. Show this to your doctor to help find the cause of your headaches.  a) If you feel that stress is a factor in your headaches, look at the sources of stress in your life. Find ways to release the build-up of those stresses by using regular exercise, relaxation methods (yoga, meditation), bio-feedback or simply taking time-out for yourself. For more information about this, consult your doctor or go to a local bookstore and review books and tapes on this subject.  b) Tyramine is a substance present in the following foods : chocolate, yogurt, all cheeses except cottage cheese and cream cheese. smoked or pickled fish and meat (including herring, caviar, bologna, pepperoni, salami), liver, avocados, bananas, figs, raisins, and red wine. Be aware that these foods may trigger a migraine in some persons. Try taking these foods out of your diet for 1-2 months to see if this reduces headache frequency.  Treating Future Attacks:  1) At the first sign of a migraine headache, take a medicine to stop it if one has been prescribed for you. If not, take acetaminophen (Tylenol) or ibuprofen (Motrin, Advil) if you are able to take these. The sooner you take medicine, the better it will work.  2) You may also want to find a quiet, dark, comfortable place to sit or lie down. Let yourself relax or sleep.  3) An ice pack on the forehead or area of greatest pain may also help.   Follow Up  with your doctor if the headache is not better within the next 24 hours. If you have frequent headaches you should discuss a treatment plan with your primary care doctor. Ask if you can have medicine to take at home the next time you get a bad headache. Poorly controlled chronic headaches may require a referral to a neurologist (headache specialist).  Get Prompt Medical Attention  if any of the following occur:    Your head pain gets worse, or does not improve within 24 hours    Repeated vomiting (can t keep liquids down)    Sinus or ear or  throat pain (not already reported)    Fever of 101  F (38.3  C) or higher, or as directed by your healthcare provider    Stiff neck    Extreme drowsiness, confusion or fainting    Weakness of an arm or leg or one side of the face    Difficulty with speech or vision    5515-6924 [employer]. [address] [place] [address]. All rights reserved. This information is not intended as a substitute for professional medical care. Always follow your healthcare professional's instructions.  This information has been modified by your health care provider with permission from the publisher.          Preventing Migraine Headaches: Medicines and Lifestyle Changes     Going to bed and getting up at the same time each day, including weekends, may help prevent migraines.     A migraine is a type of severe headache. Having a migraine can be very painful. But there are steps you can take to help prevent migraines.  Medicines to help prevent migraines    Your healthcare provider may prescribe certain medicines to help prevent migraines. These medicines may need to be taken daily. Or they may only need to be taken at times when you re likely to have a migraine.    Common medicines used to help prevent migraines include:  ? Triptans (serotonin receptor agonists)  ? Nonsteroidal anti-inflammatory drugs (available over-the-counter)  ? Beta-blockers  ? Anticonvulsants  ? Tricyclic antidepressants  ? Calcium channel blockers  ? Certain vitamins, minerals, and plant extracts  ? Botulinum toxin injection (Botox) for certain chronic migraines   ? CGRP (calcitonin gene-related peptide) agnonists are being reviewed by the Food and Drug Administration (FDA)  Lifestyle changes for long-term prevention  Here are some suggestions:    Exercise. Regular exercise can help prevent migraines and improve your health. (If exercise triggers your migraines, talk to your healthcare provider.)    Keep regular habits. Don t skip or delay  meals. Drink plenty of water. And go to bed and get up at about the same time each day. This includes weekends.    Try alternative treatments. These are treatments that do not involve the use of medicines or surgery. They may help relieve symptoms and prevent migraines. Some treatment options include biofeedback and acupuncture. Ask your healthcare provider to tell you more about these treatments if you have questions.    Limit caffeine. You may find that caffeine helps relieve pain during an attack. But too much caffeine can also trigger migraines. So, limit the amount of caffeine you consume.  Date Last Reviewed: 10/11/2015    0592-4473 [employer]. [address] [place] [address], [place] [address]. All rights reserved. This information is not intended as a substitute for professional medical care. Always follow your healthcare professional's instructions.          Preventing Migraine Headaches: Triggers  The first step in preventing migraines is to learn what triggers them. You may then be able to control your triggers to avoid or reduce the severity of your migraines.  Know your triggers  Be aware that you may have more than one trigger, and that some triggers may work together. Common migraine triggers include:    Food and nutrition. Skipping meals or not drinking enough water can trigger headaches. So can certain foods, such as caffeine, monosodium glutamate (MSG), aged cheese, or sausage.    Alcohol. Red wine and other alcoholic beverages are common migraine triggers.    Chemicals. Scents, cleaning products, gasoline, glue, perfume, and paint can be triggers. So can tobacco smoke, including secondhand smoke.    Emotions. Stress can trigger headaches or make them worse once they begin.    Sleep disruption. Staying up late, sleeping late, and traveling across time zones can disrupt your sleep cycle, triggering headaches.    Hormones. Many women notice that migraines tend to happen at a certain point in  their menstrual cycle. Birth control pills or hormone replacement therapy may also trigger migraines.    Environment and weather. Air travel, changes in altitude, air pressure changes, hot sun, or bright or flashing lights can be triggers.  Control your triggers  These are some of the things you can do to try to control triggers:    Avoid triggers if you can. For example, stay clear of alcohol and foods that trigger your headaches. Use unscented household products. Keep regular sleep habits. Manage stress to help control emotional triggers.    Change your behavior at times when triggers can't be avoided. For example, make sure to get enough rest and drink plenty of water while you're traveling. Make sure to carry a hat, sunglasses, and your medicines. Be alert for migraine symptoms, so you can treat a migraine early if it happens.  Date Last Reviewed: 10/9/2015    1114-8645 [employer]. [address] [place] [address]. All rights reserved. This information is not intended as a substitute for professional medical care. Always follow your healthcare professional's instructions.          Your next 10 appointments already scheduled     Nov 09, 2018 10:45 AM CST   (Arrive by 10:30 AM)   New Visit with Jane Landry MD   [facility] ([facility] )    [address] [place] [address]  [place] [address]   [phone]                 Review of your medicines      Our records show that you are taking the medicines listed below. If these are incorrect, please call your family doctor or clinic.        Dose / Directions Last dose taken    acetaminophen-codeine 300-30 MG per tablet   Commonly known as:  TYLENOL WITH CODEINE #3   Quantity:  60 tablet        1-2 tabs up to twice daily as needed.   Refills:  0        aspirin-acetaminophen-caffeine 250-250-65 MG per tablet   Commonly known as:  EXCEDRIN MIGRAINE   Dose:  1 tablet        Take 1 tablet by mouth every 6 hours as  "needed for headaches   Refills:  0        clonazePAM 1 MG tablet   Commonly known as:  klonoPIN   Dose:  1 mg        Take 1 mg by mouth 2 times daily as needed   Refills:  0        IBUPROFEN PO   Dose:  800 mg        Take 800 mg by mouth every 8 hours as needed   Refills:  0        MULTIVITAMIN ADULT PO        Take by mouth daily   Refills:  0        * SUMAtriptan 100 MG tablet   Commonly known as:  IMITREX   Dose:  100 mg   Quantity:  30 tablet        Take 1 tablet (100 mg) by mouth at onset of headache for migraine   Refills:  0        * SUMAtriptan 100 MG tablet   Commonly known as:  IMITREX   Dose:  100 mg   Quantity:  4 tablet        Take 1 tablet (100 mg) by mouth at onset of headache for migraine   Refills:  0        ZANAFLEX PO        Take by mouth every 6 hours as needed for muscle spasms   Refills:  0        * Notice:  This list has 2 medication(s) that are the same as other medications prescribed for you. Read the directions carefully, and ask your doctor or other care provider to review them with you.            Orders Needing Specimen Collection     None      Pending Results     No orders found from 10/8/2018 to 10/11/2018.            Pending Culture Results     No orders found from 10/8/2018 to 10/11/2018.            Thank you for choosing [facility]       Thank you for choosing [facility] for your care. Our goal is always to provide you with excellent care. Hearing back from our patients is one way we can continue to improve our services. Please take a few minutes to complete the written survey that you may receive in the mail after you visit with us. Thank you!        [facility] Information     [facility] lets you send messages to your doctor, view your test results, renew your prescriptions, schedule appointments and more. To sign up, go to [email or web address] . Click on \"Log in\" on the left side of the screen, which will take you to the Welcome page. Then click on \"Sign up Now\" on the right side of the " page.     You will be asked to enter the access code listed below, as well as some personal information. Please follow the directions to create your username and password.     Your access code is: [email or web address]  Expires: 2018  8:04 PM     Your access code will  in 90 days. If you need help or a new code, please call your [facility] clinic or [phone].        Care EveryWhere ID     This is your Care EveryWhere ID. This could be used by other organizations to access your [facility] medical records  [id number]        Equal Access to Services     [facility]: Buddy Magana, lukas ramsey, caroline ashton, erik duenas . So [facility] [phone].    ATENCIÓN: Si habla español, tiene a baltazar disposición servicios gratuitos de asistencia lingüística. Llame al [phone].    We comply with applicable federal civil rights laws and [place] laws. We do not discriminate on the basis of race, color, national origin, age, disability, sex, sexual orientation, or gender identity.            After Visit Summary       This is your record. Keep this with you and show to your community pharmacist(s) and doctor(s) at your next visit.

## 2018-10-10 NOTE — ED PROVIDER NOTES
History     Chief Complaint   Patient presents with     Headache     HPI  Venu Amaya is a 43 year old female who presents with migraine headache that started last night aroungd 6 pm . Usually she takes imitrex but she was out of her prescription . Slight nausea. Similar to all previous migraines.  Usually just an imitrex injection is all she needs. NO fever or other red flags. No focal neurologic symptoms. Patient has just started Botox injections and feels like she has started to see a decrease in migraine frequency     Problem List:    Patient Active Problem List    Diagnosis Date Noted     S/P cervical spinal fusion 03/29/2018     Priority: Medium     Neural foraminal stenosis of cervical spine 10/05/2017     Priority: Medium     Opioid dependence (H) 12/23/2015     Priority: Medium     Long term (current) use of opiate analgesic 12/23/2015     Priority: Medium     Chronic low back pain 11/02/2015     Priority: Medium     Overview:   IMO Update       Headache 10/23/2014     Priority: Medium     Overview:   10/23/14: According to neurology consultation of 9/2/14, previous treatments  include pretty much all of the triptans, Maxalt, Imitrex, Amerge, Axert, Frova, and Zomig. She reports that she has tried these when she did not have a constant headache and they did not work when she took them at first onset nor do they work now. She recently tried Imitrex injections and has tried a nasal spray in the past without improvement. The patient does take Compazine for the nausea and reports that that helps somewhat. She has tried numerous preventative medications as well including Topamax, Depakote, gabapentin, Tegretol, Dilantin, nortriptyline, propranolol, and verapamil per her report. She has tried physical therapy as well as trigger injections and denies that any of these things are helpful. She does report she takes ibuprofen 800 mg twice per day for ankylosing spondylitis. For her headaches, the only thing  she has found that helped is Tylenol with Codeine.        Cervicalgia 05/15/2014     Priority: Medium     Myalgia and myositis 05/15/2014     Priority: Medium     Abnormal pap 07/30/2013     Priority: Medium     Ankylosing spondylitis (H) 05/24/2012     Priority: Medium     Flori Finch PAC        Dysthymic disorder 09/13/2010     Priority: Medium     Anxiety state 06/02/2009     Priority: Medium     Problem list name updated by automated process. Provider to review       Depressive disorder, not elsewhere classified 10/31/2007     Priority: Medium     Migraine 01/15/2003     Priority: Medium     Problem list name updated by automated process. Provider to review          Past Medical History:    Past Medical History:   Diagnosis Date     Abnormal Pap smear and cervical HPV (human papillo 05/25/2011     Ankylosing spondylitis (H) 05/24/2012     Anxiety state, unspecified 06/02/2009     Cervicalgia pain (neck) 11/03/2011     Chronic Headache disorder  05/25/2011     Depressive disorder, not elsewhere classified 10/31/2007     Dysmenorrhea 04/03/2012     Habitual aborter, antepartum condition or complica 02/10/2005     Inguinal hernia without mention of obstruction or gangrene, unilateral or unspecified, (not specified as recurrent) 06/16/2008     Irregular menstrual cycle 05/25/2011     Mental disorders of mother, postpartum 10/06/2005     Migraine, unspecified, without mention of intractable migraine without mention of status migrainosus 01/15/2003     Moderate dysplasia of cervix 11/03/2011     Pain in thoracic spine 11/03/2011     Scoliosis (and kyphoscoliosis), idiopathic 05/16/2011       Past Surgical History:    Past Surgical History:   Procedure Laterality Date     HEAD & NECK SURGERY       inguinal hernia repair, Germain repair  10/14/2008    RT      TUBAL LIGATION         Family History:    Family History   Problem Relation Age of Onset     Cancer Mother      lymphoma     Cancer Other      Cancer  Other      Colon Cancer Sister        Social History:  Marital Status:   [2]  Social History   Substance Use Topics     Smoking status: Current Some Day Smoker     Packs/day: 0.00     Types: Cigarettes     Last attempt to quit: 7/13/2015     Smokeless tobacco: Never Used      Comment: Passive Exposure: Yes      Alcohol use No        Medications:      acetaminophen-codeine (TYLENOL/CODEINE #3) 300-30 MG per tablet   aspirin-acetaminophen-caffeine (EXCEDRIN MIGRAINE) 250-250-65 MG per tablet   clonazePAM (KLONOPIN) 1 MG tablet   IBUPROFEN PO   Multiple Vitamins-Minerals (MULTIVITAMIN ADULT PO)   SUMAtriptan (IMITREX) 100 MG tablet   SUMAtriptan (IMITREX) 100 MG tablet   TiZANidine HCl (ZANAFLEX PO)   [DISCONTINUED] PRENATAL VITAMINS PO         Review of Systems   Constitutional: Negative.    HENT: Negative.    Eyes: Positive for photophobia.   Respiratory: Negative.    Gastrointestinal: Positive for nausea. Negative for vomiting.   Musculoskeletal: Negative.    Neurological: Positive for headaches.   Psychiatric/Behavioral: Negative.    All other systems reviewed and are negative.      Physical Exam   BP: 104/68  Pulse: 98  Temp: 97.6  F (36.4  C)  Resp: 18  SpO2: 100 %      Physical Exam   Constitutional: She is oriented to person, place, and time. She appears well-developed and well-nourished.   Eyes: Pupils are equal, round, and reactive to light.   Neck: Normal range of motion. Neck supple.   Cardiovascular: Normal rate and regular rhythm.    Pulmonary/Chest: Effort normal and breath sounds normal.   Musculoskeletal: Normal range of motion.   Neurological: She is alert and oriented to person, place, and time. No cranial nerve deficit.   Skin: Skin is warm and dry.   Psychiatric: She has a normal mood and affect.   Nursing note and vitals reviewed.      ED Course     ED Course     Procedures    Patient presents to ER with complaint of migraine headache .Patient triaged to exam room . Vital signs reviewed.  History and exam completed. NO focal neurologic symptoms or other red flags to indicate need for imaging study . Patient given imitrex 0.6mg subcutaneous with marked improvement in headache and requesting discharge . Patient discharged home in improved condition with instructions to schedule a follow up appointment with her primary care and continue Botox injections and other recommendations per neurology        No results found for this or any previous visit (from the past 24 hour(s)).    Medications   SUMAtriptan (IMITREX) injection 6 mg (not administered)       Assessments & Plan (with Medical Decision Making)     I have reviewed the nursing notes.    I have reviewed the findings, diagnosis, plan and need for follow up with the patient.      New Prescriptions    No medications on file       Final diagnoses:   None   (G43.909) Migraine without status migrainosus, not intractable, unspecified migraine type      10/10/2018   [facility]     Ashley Lizarraga MD  10/10/18 1210

## 2018-10-10 NOTE — ED AVS SNAPSHOT
[facility]    [address] [address]    Phone:  [phone]                                       Venu Amaya   MRN: [id number]    Department:  [facility]   Date of Visit:  10/10/2018           After Visit Summary Signature Page     I have received my discharge instructions, and my questions have been answered. I have discussed any challenges I see with this plan with the nurse or doctor.    ..........................................................................................................................................  Patient/Patient Representative Signature      ..........................................................................................................................................  Patient Representative Print Name and Relationship to Patient    ..................................................               ................................................  Date                                   Time    ..........................................................................................................................................  Reviewed by Signature/Title    ...................................................              ..............................................  Date                                               Time          22EPIC Rev 08/18

## 2018-10-10 NOTE — DISCHARGE INSTRUCTIONS
* Migraine Headache  Migraine headaches are related to changes in blood flow to the brain. This causes throbbing or constant pain on one or both sides of the head. The pain may last from a few hours to several days. There is usually nausea, vomiting, sensitivity to light and sound, and blurred vision. A migraine attack may be triggered by emotional stress, hormone changes during the menstrual cycle, oral contraceptives, alcohol use, certain foods containing tyramine, eye strain, weather changes, missing meals, or too little or too much sleep.  Home Care For This Headache:  1) If you were given pain medicine for this headache, do not drive yourself home . Arrange for a ride, instead. When you get home, try to sleep. You should feel much better when you wake up.  2) Migraine headaches may improve with an ice pack on the forehead or at the base of the skull. Heat to the back of your neck may relieve any neck spasm.  3) Drink only clear liquids or eat a very light diet to avoid nausea/vomiting until symptoms improve.  Preventing Future Headaches:  1) Pay attention to those factors that seem to trigger your headache. Try to avoid them when you can. If you have frequent headaches, it is useful to keep a diary of what you were doing, feeling or eating in the hours before each attack. Show this to your doctor to help find the cause of your headaches.  a) If you feel that stress is a factor in your headaches, look at the sources of stress in your life. Find ways to release the build-up of those stresses by using regular exercise, relaxation methods (yoga, meditation), bio-feedback or simply taking time-out for yourself. For more information about this, consult your doctor or go to a local bookstore and review books and tapes on this subject.  b) Tyramine is a substance present in the following foods : chocolate, yogurt, all cheeses except cottage cheese and cream cheese. smoked or pickled fish and meat (including herring,  caviar, bologna, pepperoni, salami), liver, avocados, bananas, figs, raisins, and red wine. Be aware that these foods may trigger a migraine in some persons. Try taking these foods out of your diet for 1-2 months to see if this reduces headache frequency.  Treating Future Attacks:  1) At the first sign of a migraine headache, take a medicine to stop it if one has been prescribed for you. If not, take acetaminophen (Tylenol) or ibuprofen (Motrin, Advil) if you are able to take these. The sooner you take medicine, the better it will work.  2) You may also want to find a quiet, dark, comfortable place to sit or lie down. Let yourself relax or sleep.  3) An ice pack on the forehead or area of greatest pain may also help.   Follow Up  with your doctor if the headache is not better within the next 24 hours. If you have frequent headaches you should discuss a treatment plan with your primary care doctor. Ask if you can have medicine to take at home the next time you get a bad headache. Poorly controlled chronic headaches may require a referral to a neurologist (headache specialist).  Get Prompt Medical Attention  if any of the following occur:    Your head pain gets worse, or does not improve within 24 hours    Repeated vomiting (can t keep liquids down)    Sinus or ear or throat pain (not already reported)    Fever of 101  F (38.3  C) or higher, or as directed by your healthcare provider    Stiff neck    Extreme drowsiness, confusion or fainting    Weakness of an arm or leg or one side of the face    Difficulty with speech or vision    3136-2586 [employer]. [address] [place] [address], [place] [address]. All rights reserved. This information is not intended as a substitute for professional medical care. Always follow your healthcare professional's instructions.  This information has been modified by your health care provider with permission from the publisher.          Preventing Migraine Headaches: Medicines  and Lifestyle Changes     Going to bed and getting up at the same time each day, including weekends, may help prevent migraines.     A migraine is a type of severe headache. Having a migraine can be very painful. But there are steps you can take to help prevent migraines.  Medicines to help prevent migraines    Your healthcare provider may prescribe certain medicines to help prevent migraines. These medicines may need to be taken daily. Or they may only need to be taken at times when you re likely to have a migraine.    Common medicines used to help prevent migraines include:  ? Triptans (serotonin receptor agonists)  ? Nonsteroidal anti-inflammatory drugs (available over-the-counter)  ? Beta-blockers  ? Anticonvulsants  ? Tricyclic antidepressants  ? Calcium channel blockers  ? Certain vitamins, minerals, and plant extracts  ? Botulinum toxin injection (Botox) for certain chronic migraines   ? CGRP (calcitonin gene-related peptide) agnonists are being reviewed by the Food and Drug Administration (FDA)  Lifestyle changes for long-term prevention  Here are some suggestions:    Exercise. Regular exercise can help prevent migraines and improve your health. (If exercise triggers your migraines, talk to your healthcare provider.)    Keep regular habits. Don t skip or delay meals. Drink plenty of water. And go to bed and get up at about the same time each day. This includes weekends.    Try alternative treatments. These are treatments that do not involve the use of medicines or surgery. They may help relieve symptoms and prevent migraines. Some treatment options include biofeedback and acupuncture. Ask your healthcare provider to tell you more about these treatments if you have questions.    Limit caffeine. You may find that caffeine helps relieve pain during an attack. But too much caffeine can also trigger migraines. So, limit the amount of caffeine you consume.  Date Last Reviewed: 10/11/2015    9228-8713 [employer]  [employer]. [address] [place] [address], [place] [address]. All rights reserved. This information is not intended as a substitute for professional medical care. Always follow your healthcare professional's instructions.          Preventing Migraine Headaches: Triggers  The first step in preventing migraines is to learn what triggers them. You may then be able to control your triggers to avoid or reduce the severity of your migraines.  Know your triggers  Be aware that you may have more than one trigger, and that some triggers may work together. Common migraine triggers include:    Food and nutrition. Skipping meals or not drinking enough water can trigger headaches. So can certain foods, such as caffeine, monosodium glutamate (MSG), aged cheese, or sausage.    Alcohol. Red wine and other alcoholic beverages are common migraine triggers.    Chemicals. Scents, cleaning products, gasoline, glue, perfume, and paint can be triggers. So can tobacco smoke, including secondhand smoke.    Emotions. Stress can trigger headaches or make them worse once they begin.    Sleep disruption. Staying up late, sleeping late, and traveling across time zones can disrupt your sleep cycle, triggering headaches.    Hormones. Many women notice that migraines tend to happen at a certain point in their menstrual cycle. Birth control pills or hormone replacement therapy may also trigger migraines.    Environment and weather. Air travel, changes in altitude, air pressure changes, hot sun, or bright or flashing lights can be triggers.  Control your triggers  These are some of the things you can do to try to control triggers:    Avoid triggers if you can. For example, stay clear of alcohol and foods that trigger your headaches. Use unscented household products. Keep regular sleep habits. Manage stress to help control emotional triggers.    Change your behavior at times when triggers can't be avoided. For example, make sure to get enough rest and  drink plenty of water while you're traveling. Make sure to carry a hat, sunglasses, and your medicines. Be alert for migraine symptoms, so you can treat a migraine early if it happens.  Date Last Reviewed: 10/9/2015    5323-1055 [employer]. [address] [place] [address]. All rights reserved. This information is not intended as a substitute for professional medical care. Always follow your healthcare professional's instructions.

## 2018-10-10 NOTE — ED NOTES
Patient states her pain is now 5/10 and she states she feels like she could go home.  Provider updated.

## 2018-11-09 ENCOUNTER — OFFICE VISIT (OUTPATIENT)
Dept: OTOLARYNGOLOGY | Facility: OTHER | Age: 43
End: 2018-11-09
Attending: OTOLARYNGOLOGY
Payer: COMMERCIAL

## 2018-11-09 VITALS
BODY MASS INDEX: 19.49 KG/M2 | TEMPERATURE: 99.8 F | WEIGHT: 110 LBS | DIASTOLIC BLOOD PRESSURE: 70 MMHG | SYSTOLIC BLOOD PRESSURE: 110 MMHG | HEART RATE: 93 BPM | HEIGHT: 63 IN

## 2018-11-09 DIAGNOSIS — J38.00 VOCAL CORD PARALYSIS: Primary | ICD-10-CM

## 2018-11-09 DIAGNOSIS — R49.0 DYSPHONIA: ICD-10-CM

## 2018-11-09 DIAGNOSIS — Z98.1 HISTORY OF FUSION OF CERVICAL SPINE: ICD-10-CM

## 2018-11-09 DIAGNOSIS — Z71.6 TOBACCO ABUSE COUNSELING: ICD-10-CM

## 2018-11-09 PROCEDURE — 31575 DIAGNOSTIC LARYNGOSCOPY: CPT | Performed by: OTOLARYNGOLOGY

## 2018-11-09 PROCEDURE — 99203 OFFICE O/P NEW LOW 30 MIN: CPT | Mod: 25 | Performed by: OTOLARYNGOLOGY

## 2018-11-09 PROCEDURE — G0463 HOSPITAL OUTPT CLINIC VISIT: HCPCS | Mod: 25

## 2018-11-09 ASSESSMENT — PAIN SCALES - GENERAL: PAINLEVEL: SEVERE PAIN (7)

## 2018-11-09 NOTE — PATIENT INSTRUCTIONS
Thank you for allowing Dr. Landry and our ENT team to participate in your care.  If your medications are too expensive, please give the nurse a call.  We can possibly change this medication.  If you have a scheduling or an appointment question please contact our Health Unit Coordinator at their direct line [phone].   ALL nursing questions or concerns can be directed to your ENT nurse at: [phone] - Deanna    Follow up at the [facility]  Start Voice Therapy  Congratulations on quitting smoking  Follow up with ENT as needed

## 2018-11-09 NOTE — NURSING NOTE
"Chief Complaint   Patient presents with     change in voice     Pt has been referred by Luca for change in voice.  Pt had a cervical discectomy 2/22/18 and has had a change since.       Initial /70 (BP Location: Left arm, Cuff Size: Adult Regular)  Pulse 93  Temp 99.8  F (37.7  C) (Tympanic)  Ht 1.6 m (5' 3\")  Wt 49.9 kg (110 lb)  BMI 19.49 kg/m2 Estimated body mass index is 19.49 kg/(m^2) as calculated from the following:    Height as of this encounter: 1.6 m (5' 3\").    Weight as of this encounter: 49.9 kg (110 lb).  Medication Reconciliation: complete    Dinah Patel LPN    "

## 2018-11-09 NOTE — MR AVS SNAPSHOT
After Visit Summary   11/9/2018    Venu Amaya    MRN: [id number]           Patient Information     Date Of Birth          1975        Visit Information        Provider Department      11/9/2018 10:45 AM Jane Landry MD [facility]        Care Instructions    Thank you for allowing Dr. Landry and our ENT team to participate in your care.  If your medications are too expensive, please give the nurse a call.  We can possibly change this medication.  If you have a scheduling or an appointment question please contact our Health Unit Coordinator at their direct line [phone].   ALL nursing questions or concerns can be directed to your ENT nurse at: [phone] - Deanna    Follow up at the [facility]  Start Voice Therapy  Congratulations on quitting smoking  Follow up with ENT as needed          Follow-ups after your visit        Follow-up notes from your care team     Return if symptoms worsen or fail to improve.      Who to contact     If you have questions or need follow up information about today's clinic visit or your schedule please contact [facility] directly at [phone].  Normal or non-critical lab and imaging results will be communicated to you by MyChart, letter or phone within 4 business days after the clinic has received the results. If you do not hear from us within 7 days, please contact the clinic through MyChart or phone. If you have a critical or abnormal lab result, we will notify you by phone as soon as possible.  Submit refill requests through [employer] or call your pharmacy and they will forward the refill request to us. Please allow 3 business days for your refill to be completed.          Additional Information About Your Visit        Care EveryWhere ID     This is your Care EveryWhere ID. This could be used by other organizations to access your [place] medical records  [id number]        Your  "Vitals Were     Pulse Temperature Height BMI (Body Mass Index)          93 99.8  F (37.7  C) (Tympanic) 1.6 m (5' 3\") 19.49 kg/m2         Blood Pressure from Last 3 Encounters:   11/09/18 110/70   10/10/18 104/68   08/31/18 105/71    Weight from Last 3 Encounters:   11/09/18 49.9 kg (110 lb)   06/22/18 49.9 kg (110 lb)   05/23/18 50.3 kg (111 lb)              Today, you had the following     No orders found for display       Primary Care Provider Office Phone # Fax #    Robin Segovia [phone]       [facility] [address] [facility] [address]        Equal Access to Services     KING TREVINO : Hadii lis wardo Soomaali, waaxda luqadaha, qaybta kaalmada adeegyada, erik duenas . So [facility] [phone].    ATENCIÓN: Si habla español, tiene a baltazar disposición servicios gratuitos de asistencia lingüística. Llame al [phone].    We comply with applicable federal civil rights laws and [place] laws. We do not discriminate on the basis of race, color, national origin, age, disability, sex, sexual orientation, or gender identity.            Thank you!     Thank you for choosing [facility]  for your care. Our goal is always to provide you with excellent care. Hearing back from our patients is one way we can continue to improve our services. Please take a few minutes to complete the written survey that you may receive in the mail after your visit with us. Thank you!             Your Updated Medication List - Protect others around you: Learn how to safely use, store and throw away your medicines at www.disposemymeds.org.          This list is accurate as of 11/9/18 11:22 AM.  Always use your most recent med list.                   Brand Name Dispense Instructions for use Diagnosis    acetaminophen-codeine 300-30 MG per tablet    TYLENOL WITH CODEINE #3    60 tablet    1-2 tabs up to twice daily as needed.    Chronic migraine without aura, with " intractable migraine, so stated, without mention of status migrainosus       clonazePAM 1 MG tablet    klonoPIN     Take 1 mg by mouth 2 times daily as needed        IBUPROFEN PO      Take 800 mg by mouth every 8 hours as needed        MULTIVITAMIN ADULT PO      Take by mouth daily        SUMAtriptan 100 MG tablet    IMITREX    30 tablet    Take 1 tablet (100 mg) by mouth at onset of headache for migraine        ZANAFLEX PO      Take by mouth every 6 hours as needed for muscle spasms

## 2018-11-09 NOTE — PROGRESS NOTES
Otolaryngology Consultation    Patient: Venu Amaya  : 1975    Patient presents with:  change in voice: Pt has been referred by Luca for change in voice.  Pt had a cervical discectomy 18 and has had a change since.      HPI:  Venu Amaya is a 43 year old female seen today for hoarseness.  She noted immediate onset of hoarseness after C4-7 fusion on 2018 with Dr. Tate.  She states there is no problem with the surgery was otherwise uncomplicated  She denies chronic cough  There is no dysphasia or weight loss  She does smoke        Current Outpatient Rx   Medication Sig Dispense Refill     acetaminophen-codeine (TYLENOL/CODEINE #3) 300-30 MG per tablet 1-2 tabs up to twice daily as needed. 60 tablet 0     clonazePAM (KLONOPIN) 1 MG tablet Take 1 mg by mouth 2 times daily as needed        IBUPROFEN PO Take 800 mg by mouth every 8 hours as needed        Multiple Vitamins-Minerals (MULTIVITAMIN ADULT PO) Take by mouth daily       SUMAtriptan (IMITREX) 100 MG tablet Take 1 tablet (100 mg) by mouth at onset of headache for migraine 30 tablet 0     TiZANidine HCl (ZANAFLEX PO) Take by mouth every 6 hours as needed for muscle spasms       [DISCONTINUED] PRENATAL VITAMINS PO Take 1 tablet by mouth daily.         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/2005    Christa Tabares MD      Inguinal hernia without mention of obstruction or gangrene, unilateral or unspecified, (not specified  "as recurrent) 06/16/2008     Irregular menstrual cycle 05/25/2011     Mental disorders of mother, postpartum 10/06/2005     Migraine, unspecified, without mention of intractable migraine without mention of status migrainosus 01/15/2003     Moderate dysplasia of cervix 11/03/2011     Pain in thoracic spine 11/03/2011     Scoliosis (and kyphoscoliosis), idiopathic 05/16/2011    Abdiel Nolan MD        Past Surgical History:   Procedure Laterality Date     HEAD & NECK SURGERY       inguinal hernia repair, Germain repair  10/14/2008    RT      TUBAL LIGATION         ENT family history reviewed    Social History   Substance Use Topics     Smoking status: Current Some Day Smoker     Packs/day: 0.00     Types: Cigarettes     Last attempt to quit: 7/13/2015     Smokeless tobacco: Never Used      Comment: Passive Exposure: Yes      Alcohol use No       Review of Systems  ROS: 10 point ROS neg other than the symptoms noted above in the HPI and headaches    Physical Exam  /70 (BP Location: Left arm, Cuff Size: Adult Regular)  Pulse 93  Temp 99.8  F (37.7  C) (Tympanic)  Ht 1.6 m (5' 3\")  Wt 49.9 kg (110 lb)  BMI 19.49 kg/m2  General - The patient is well nourished and well developed, and appears to have good nutritional status.  Alert and oriented to person and place, answers questions and cooperates with examination appropriately.   Head and Face - Normocephalic and atraumatic, with no gross asymmetry noted.  The facial nerve is intact, with strong symmetric movements.  Voice and Breathing - The patient was breathing comfortably without the use of accessory muscles. There was no wheezing, stridor, or stertor.  The patients voice was breathy  No alena peripheral digital clubbing or cyanosis   Ears -The external auditory canals are patent, the tympanic membranes are intact without effusion, retraction or mass.  Bony landmarks are intact.  Eyes - Extraocular movements intact, and the pupils were reactive to " light.  Sclera were not icteric or injected, conjunctiva were pink and moist.  Mouth - Examination of the oral cavity showed pink, healthy oral mucosa. No lesions or ulcerations noted.  The tongue was mobile and midline, and the dentition were in good condition.    Throat - The walls of the oropharynx were smooth, pink, moist, symmetric, and had no lesions or ulcerations.  The tonsillar pillars and soft palate were symmetric.  The uvula was midline on elevation.    Neck - No palpable enlarged fixed cervical lymph nodes.  No neck cysts or unusual tenderness to palpation.   No palpable fixed thyroid nodules or concerning goiter.  The trachea is grossly midline.  Well-healed right lower neck incision  Nose - External contour is symmetric, no gross deflection or scars.  Nasal mucosa is pink and moist with no abnormal mucus.  The septum and turbinates were evaluated: non obstructive grossly.  No polyps, masses, or purulence noted on examination.    Attempts at mirror laryngoscopy were not possible due to gag reflex.  Therefore I proceeded with a fiberoptic examination after informed consent.  First I sprayed both sides of the nose with a mixture of lidocaine and neosynephrine.  I then passed the scope through the nasal cavity.     The nasopharynx was mucosally covered and symmetric.  The eustachian tube openings were unobstructed.  Going further down I had a clear view of the base of tongue which had normal appearing lingual tonsillar tissue.  The base of tongue was free of lesions, and the vallecula was open.  The epiglottis was smooth and mucosally covered.  The supraglottic larynx was then clearly visualized.  There is a right vocal cord paralysis.  I cannot appreciate any movement of the right vocal cord.  There is a mild glottic gap with phonation but there is good approximation of the left cord.  Left cord mobility is normal.   the pyriform sinuses were open and without alena mass or pooling of secretions upon  valsalva, and the limited view of the postcricoid region did not show any lesions.  The patient tolerated the procedure well.        Impression and Plan- Venu Amaya is a 43 year old female with:    ICD-10-CM    1. Vocal cord paralysis J38.00    2. History of fusion of cervical spine Z98.1    3. Dysphonia R49.0    4. Tobacco abuse counseling Z71.6        I told yeimi it may take up to a year for vocal cord function to normalize after injury.  She has noted no improvement since February of last year so I feel it is fair to place a referral to laryngology    Follow up at the [facility] center  Start Voice Therapy  Congratulations on quitting smoking  Follow up with ENT as needed      Tobacco cessation was strongly encouraged.  The associated risk of head and neck cancer was discussed.  Every year of cessation offers health benefits. This was discussed with the patient today and they voiced understanding.  Quit tools and a nicotine patch were offered.          Jane Landry D.O.  Otolaryngology/Head and Neck Surgery  Allergy

## 2018-11-09 NOTE — LETTER
2018         RE: Venu Amaya  [address] [facility] [address]        Dear Colleague,    Thank you for referring your patient, Venu Amaya, to the [facility]. Please see a copy of my visit note below.    Otolaryngology Consultation    Patient: Venu Amaya  : 1975    Patient presents with:  change in voice: Pt has been referred by Luca for change in voice.  Pt had a cervical discectomy 18 and has had a change since.      HPI:  Venu Amaya is a 43 year old female seen today for hoarseness.  She noted immediate onset of hoarseness after C4-7 fusion on 2018 with Dr. Tate.  She states there is no problem with the surgery was otherwise uncomplicated  She denies chronic cough  There is no dysphasia or weight loss  She does smoke        Current Outpatient Rx   Medication Sig Dispense Refill     acetaminophen-codeine (TYLENOL/CODEINE #3) 300-30 MG per tablet 1-2 tabs up to twice daily as needed. 60 tablet 0     clonazePAM (KLONOPIN) 1 MG tablet Take 1 mg by mouth 2 times daily as needed        IBUPROFEN PO Take 800 mg by mouth every 8 hours as needed        Multiple Vitamins-Minerals (MULTIVITAMIN ADULT PO) Take by mouth daily       SUMAtriptan (IMITREX) 100 MG tablet Take 1 tablet (100 mg) by mouth at onset of headache for migraine 30 tablet 0     TiZANidine HCl (ZANAFLEX PO) Take by mouth every 6 hours as needed for muscle spasms       [DISCONTINUED] PRENATAL VITAMINS PO Take 1 tablet by mouth daily.         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    Christa Tabares MD      Inguinal hernia without mention of obstruction or gangrene, unilateral or unspecified, (not specified as recurrent) 06/16/2008     Irregular menstrual cycle 05/25/2011     Mental disorders of mother, postpartum 10/06/2005     Migraine, unspecified, without mention of intractable migraine without mention of status migrainosus 01/15/2003     Moderate dysplasia of cervix 11/03/2011     Pain in thoracic spine 11/03/2011     Scoliosis (and kyphoscoliosis), idiopathic 05/16/2011    Abdiel Nolan MD        Past Surgical History:   Procedure Laterality Date     HEAD & NECK SURGERY       inguinal hernia repair, Germain repair  10/14/2008    RT      TUBAL LIGATION         ENT family history reviewed    Social History   Substance Use Topics     Smoking status: Current Some Day Smoker     Packs/day: 0.00     Types: Cigarettes     Last attempt to quit: 7/13/2015     Smokeless tobacco: Never Used      Comment: Passive Exposure: Yes      Alcohol use No       Review of Systems  ROS: 10 point ROS neg other than the symptoms noted above in the HPI and headaches    Physical Exam  /70 (BP Location: Left arm, Cuff Size: Adult Regular)  Pulse 93  Temp 99.8  F (37.7  C) (Tympanic)  Ht 1.6 m (5' 3\")  Wt 49.9 kg (110 lb)  BMI 19.49 kg/m2  General - The patient is well nourished and well developed, and appears to have good nutritional status.  Alert and oriented to person and place, answers questions and cooperates with examination appropriately.   Head and Face - Normocephalic and atraumatic, with no gross asymmetry noted.  The facial nerve is intact, with strong symmetric movements.  Voice and Breathing - The patient was breathing comfortably without the use of accessory muscles. There was no wheezing, stridor, or stertor.  The patients voice was breathy  No alena " peripheral digital clubbing or cyanosis   Ears -The external auditory canals are patent, the tympanic membranes are intact without effusion, retraction or mass.  Bony landmarks are intact.  Eyes - Extraocular movements intact, and the pupils were reactive to light.  Sclera were not icteric or injected, conjunctiva were pink and moist.  Mouth - Examination of the oral cavity showed pink, healthy oral mucosa. No lesions or ulcerations noted.  The tongue was mobile and midline, and the dentition were in good condition.    Throat - The walls of the oropharynx were smooth, pink, moist, symmetric, and had no lesions or ulcerations.  The tonsillar pillars and soft palate were symmetric.  The uvula was midline on elevation.    Neck - No palpable enlarged fixed cervical lymph nodes.  No neck cysts or unusual tenderness to palpation.   No palpable fixed thyroid nodules or concerning goiter.  The trachea is grossly midline.  Well-healed right lower neck incision  Nose - External contour is symmetric, no gross deflection or scars.  Nasal mucosa is pink and moist with no abnormal mucus.  The septum and turbinates were evaluated: non obstructive grossly.  No polyps, masses, or purulence noted on examination.    Attempts at mirror laryngoscopy were not possible due to gag reflex.  Therefore I proceeded with a fiberoptic examination after informed consent.  First I sprayed both sides of the nose with a mixture of lidocaine and neosynephrine.  I then passed the scope through the nasal cavity.     The nasopharynx was mucosally covered and symmetric.  The eustachian tube openings were unobstructed.  Going further down I had a clear view of the base of tongue which had normal appearing lingual tonsillar tissue.  The base of tongue was free of lesions, and the vallecula was open.  The epiglottis was smooth and mucosally covered.  The supraglottic larynx was then clearly visualized.  There is a right vocal cord paralysis.  I cannot  appreciate any movement of the right vocal cord.  There is a mild glottic gap with phonation but there is good approximation of the left cord.  Left cord mobility is normal.   the pyriform sinuses were open and without alena mass or pooling of secretions upon valsalva, and the limited view of the postcricoid region did not show any lesions.  The patient tolerated the procedure well.        Impression and Plan- Venu Amaya is a 43 year old female with:    ICD-10-CM    1. Vocal cord paralysis J38.00    2. History of fusion of cervical spine Z98.1    3. Dysphonia R49.0    4. Tobacco abuse counseling Z71.6        I told yeimi it may take up to a year for vocal cord function to normalize after injury.  She has noted no improvement since February of last year so I feel it is fair to place a referral to laryngology    Follow up at the [facility]  Start Voice Therapy  Congratulations on quitting smoking  Follow up with ENT as needed      Tobacco cessation was strongly encouraged.  The associated risk of head and neck cancer was discussed.  Every year of cessation offers health benefits. This was discussed with the patient today and they voiced understanding.  Quit tools and a nicotine patch were offered.          Jane Landry D.O.  Otolaryngology/Head and Neck Surgery  Allergy      Again, thank you for allowing me to participate in the care of your patient.        Sincerely,        Jane Landry MD

## 2018-11-19 ENCOUNTER — TELEPHONE (OUTPATIENT)
Dept: OTOLARYNGOLOGY | Facility: CLINIC | Age: 43
End: 2018-11-19

## 2018-12-04 NOTE — PROGRESS NOTES
SUBJECTIVE:                                                    Venu Amaya is a 42 year old female who presents to clinic today for the following health issues:      Migraine Follow-Up    Headaches symptoms:  Worsened -pain has increased and they are more frequent    Frequency: Daily     Duration of headaches: don't go away until she gets and Imitrex injection pt states    Able to do normal daily activities/work with migraines: No - not able to work    Rescue/Relief medication:ibuprofen (Advil, Motrin), Excedrin, sumatriptan (Imitrex) and narcotics ( Tylenol #3)              Effectiveness: intermittent relief    Preventative medication: tylenol    Neurologic complications: No new stroke-like symptoms, loss of vision or speech, numbness or weakness    In the past 4 weeks, how often have you gone to Urgent Care or the emergency room because of your headaches?  3 or more      Amount of exercise or physical activity: pt states is depends on her headaches    Problems taking medications regularly: No    Medication side effects: none    Diet: regular (no restrictions)          Problem list and histories reviewed & adjusted, as indicated.  Additional history: as documented    Current Outpatient Prescriptions   Medication     acetaminophen-codeine (TYLENOL/CODEINE #3) 300-30 MG per tablet     aspirin-acetaminophen-caffeine (EXCEDRIN MIGRAINE) 250-250-65 MG per tablet     clonazePAM (KLONOPIN) 1 MG tablet     Multiple Vitamins-Minerals (MULTIVITAMIN ADULT PO)     SUMAtriptan Succinate (IMITREX PO)     TiZANidine HCl (ZANAFLEX PO)     [DISCONTINUED] PRENATAL VITAMINS PO     No current facility-administered medications for this visit.        Patient Active Problem List   Diagnosis     Abnormal pap     Anxiety state     Depressive disorder, not elsewhere classified     Migraine     Ankylosing spondylitis (H)     Cervicalgia     Dysthymic disorder     Myalgia and myositis     Chronic low back pain     Opioid dependence    SUBJECTIVE:   Camilo Baca is a 67 year old male who presents to clinic today for the following health issues:    Concern - hives  Onset: 2 weeks    Description:   Patient started to take plaquenil on approximately November 8. Hives started approximately November 17.  Since then using Cortaid cream and triamcinolone and they do not seem to be helping. He has been taking benadryl. He still has a flushed face that is tingling and itchy.      Nursing notes above reviewed and confirmed with patient.  Stated that the rash started about 10 days after he started the Plaquenil.  It was localized on his cheeks and nose.  Rash was red with hives in character.  Was itching.  Started on Plaquenil for his rheumatoid arthritis by rheumatologist.  He stopped the Plaquenil about 2 weeks ago.  The rash was getting better initially after stopping the Plaquenil but it stalls - has not gotten any better in the last week or so.  His itching and tingling.  Localized on the tip of his nose and on the cheek bilaterally.  No pain.  No fever or chills.  No unusual food or new medications besides the Plaquenil.  No runny nose or nasal congestion.  No chest pain or shortness of breath.  Been taking the Benadryl and the Zyrtec and they helpl.  No acute change in her his vision.  No change in lotions or detergent.  No exposure to rash or animal.  No recent history of international traveling.  Never had this kind of rash before.  No other concerns.    Problem list and histories reviewed & adjusted, as indicated.  Additional history: as documented    Current Outpatient Prescriptions   Medication Sig Dispense Refill     allopurinol (ZYLOPRIM) 300 MG tablet TAKE ONE TABLET BY MOUTH EVERY DAY 90 tablet 1     Ascorbic Acid (VITAMIN C PO) Take by mouth daily       atorvastatin (LIPITOR) 80 MG tablet Take 1 tablet (80 mg) by mouth daily 90 tablet 2     capsaicin (ZOSTRIX) 0.075 % topical cream Apply  topically 3 times daily. 50 g 3     cetirizine  "(H)     Headache     Long term (current) use of opiate analgesic     Neural foraminal stenosis of cervical spine     S/P cervical spinal fusion     Past Surgical History:   Procedure Laterality Date     HEAD & NECK SURGERY       inguinal hernia repair, Germain repair  10/14/2008    RT      TUBAL LIGATION         Social History   Substance Use Topics     Smoking status: Former Smoker     Packs/day: 0.00     Types: Cigarettes     Quit date: 7/13/2015     Smokeless tobacco: Never Used      Comment: Passive Exposure: Yes      Alcohol use No     Family History   Problem Relation Age of Onset     CANCER Mother      lymphoma     CANCER Other      CANCER Other      Colon Cancer Sister            ROS:  CONSTITUTIONAL:NEGATIVE for fever, chills, change in weight  NEURO: POSITIVE for headaches  PSYCHIATRIC: POSITIVE for anxiety    OBJECTIVE:     /68 (BP Location: Left arm, Patient Position: Sitting, Cuff Size: Adult Regular)  Pulse 94  Temp 98  F (36.7  C) (Tympanic)  Resp 16  Ht 5' 3\" (1.6 m)  Wt 111 lb (50.3 kg)  SpO2 98%  BMI 19.66 kg/m2  Body mass index is 19.66 kg/(m^2).  GENERAL: healthy, alert and no distress  PSYCH: mentation appears normal, affect normal/bright      ASSESSMENT/PLAN:   (R51) Chronic nonintractable headache, unspecified headache type  (primary encounter diagnosis)    (F41.9) Anxiety    Tobacco use disorder and cessation counseling.    Patient started out by stating that she was switching care to me from Dr. Segovia.  Went on to say something about him doing a house call on her brother and telling him everything they had discussed about her brother?    I was able to view through Care Everywhere that she had a recent visit and that she is on a controlled substance contract with [employer].  I informed her that I would not be comfortable continuing her medication regimen, and that I would agree with tapering.  I discussed the safety concerns with narcotics and benzodiazepines.    I encouraged " (ZYRTEC) 10 MG tablet Take 1 tablet (10 mg) by mouth every evening 90 tablet 1     cholestyramine light (QUESTRAN) 4 GM Packet DISSOLVE ONE PACKET IN LIQUID AS DIRECTED TWO TIMES A DAY AND DRINK 180 packet 0     Cyanocobalamin (B-12) 100 MCG TABS        diclofenac (VOLTAREN) 1 % GEL Apply  2 grams to shoulders three times daily using enclosed dosing card. 100 g 1     levothyroxine (SYNTHROID/LEVOTHROID) 25 MCG tablet TAKE ONE TABLET BY MOUTH EVERY DAY 90 tablet 0     losartan (COZAAR) 50 MG tablet Take 1 tablet (50 mg) by mouth daily 90 tablet 1     omeprazole (PRILOSEC) 40 MG capsule TAKE ONE CAPSULE BY MOUTH EVERY DAY 90 capsule 3     sucralfate (CARAFATE) 1 GM tablet Take 1 tablet (1 g) by mouth 4 times daily 40 tablet 1     triamcinolone (KENALOG) 0.1 % cream Locally bid 60 g 0     triamcinolone (KENALOG) 0.5 % external cream Apply sparingly to affected area three times daily as needed, no more than 7 days in a row. 30 g 0     BABY ASPIRIN OR daily       predniSONE (DELTASONE) 5 MG tablet Prednisone 20mg daily x5days, then 15mg daily x5days, then 10mg daily thereafter. (Patient not taking: Reported on 12/4/2018) 201 tablet 0     Allergies   Allergen Reactions     Lisinopril Cough       Reviewed and updated as needed this visit by clinical staff  Tobacco  Allergies  Meds  Soc Hx      Reviewed and updated as needed this visit by Provider         ROS:  Constitutional, HEENT, cardiovascular, pulmonary, GI, , musculoskeletal, neuro, skin, endocrine and psych systems are negative, except as otherwise noted.    OBJECTIVE:     /76 (BP Location: Left arm, Patient Position: Sitting, Cuff Size: Adult Large)  Pulse 72  Temp 97.7  F (36.5  C) (Temporal)  Resp 16  Wt 200 lb 8 oz (90.9 kg)  SpO2 98%  BMI 27.57 kg/m2  Body mass index is 27.57 kg/(m^2).  GENERAL: healthy, alert and no distress.  Speaking in full sentences.  EYES: Eyes grossly normal to inspection, PERRL and conjunctivae and sclerae normal.  No  conjunctival injections or erythema.  No neck stiffness.  HENT: ear canals and TM's normal, nose and mouth without ulcers or lesions.  Nares are non-congested. Oropharynx is pink and moist. No tender with palpation to the sinuses.   NECK: no adenopathy, supple, no lymphadenopathy or thyromegaly.  RESP: lungs clear to auscultation - no rales, rhonchi or wheezes  CV: regular rate and rhythm, no murmur  SKIN: Deep red papular rash noticed on the nose and the cheek bilaterally.  There is symmetrical.  No blanching.  No warmth to the touch.  NEURO: Normal strength and tone, mentation intact and speech normal.  No focal neurological deficit.      Diagnostic Test Results:  No results found for this or any previous visit (from the past 24 hour(s)).    ASSESSMENT/PLAN:       ICD-10-CM    1. Rash of face R21 triamcinolone (KENALOG) 0.5 % external cream     Most likely due to allergic reaction to the Plaquenil.  Discussed with him about the nature of the condition.  Continue to hold off the Plaquenil.  Tylenol or Motrin as needed for pain or fever.  Continue with Zyrtec and Benadryl as needed for itching.  Will have him start the triamcinolone cream as needed as well.  Follow-up with a rheumatologist as per his recommendation.  Call in or follow-up if symptom persists or gets worse.      Marito Florez Mai, MD  [facility]     other modalities to treat her pain and anxiety, including counseling, SSRI, migraine prophylaxis (such as Topamax, Propranolol, Depakote, etc), physical therapy.  I also advised smoking cessation.      She states she will continue to follow with Dr. Segovia until her contract is up.  States she will be back then.        Over 15 minutes with patient, over 50% in direct counseling of patient.      Vani Arrington MD  [facility]

## 2019-02-12 NOTE — TELEPHONE ENCOUNTER
FUTURE VISIT INFORMATION      FUTURE VISIT INFORMATION:    Date: 2/15/19    Time: 10AM    [place] [facility] [place]   REFERRAL INFORMATION:    Referring provider:  Jane Landry MD    Referring providers clinic:  [facility] [place]    Reason for visit/diagnosis  Vocal cord paralysis     RECORDS REQUESTED FROM:       Clinic name Comments Records Status Imaging Status    [facility] [place] 1/9/18 notes with Dr Landry EPIC    [facility]  2/22/18 Cervical spondylosis with radiculopathy  with Roger Tate MD   Care Everywhere    [facility] 3/10/18 ED notes with Dr Arianne Robertson for TMJ EPIC

## 2019-02-15 ENCOUNTER — PRE VISIT (OUTPATIENT)
Dept: OTOLARYNGOLOGY | Facility: CLINIC | Age: 44
End: 2019-02-15

## 2019-10-30 NOTE — ED AVS SNAPSHOT
[facility]    [address] [address]    Phone:  [phone]                                       Venu Amaya   MRN: [id number]    Department:  [facility]   Date of Visit:  5/18/2018           After Visit Summary Signature Page     I have received my discharge instructions, and my questions have been answered. I have discussed any challenges I see with this plan with the nurse or doctor.    ..........................................................................................................................................  Patient/Patient Representative Signature      ..........................................................................................................................................  Patient Representative Print Name and Relationship to Patient    ..................................................               ................................................  Date                                            Time    ..........................................................................................................................................  Reviewed by Signature/Title    ...................................................              ..............................................  Date                                                            Time           .

## 2021-05-25 ENCOUNTER — MEDICAL CORRESPONDENCE (OUTPATIENT)
Dept: MRI IMAGING | Facility: HOSPITAL | Age: 46
End: 2021-05-25

## 2021-09-20 NOTE — ED AVS SNAPSHOT
[facility]    [address] [place] [address]    Phone:  [phone]                                       Venu Amaya   MRN: [id number]    Department:  [facility]   Date of Visit:  6/22/2018           Patient Information     Date Of Birth          1975        Your diagnoses for this visit were:     Intractable chronic migraine without aura and without status migrainosus        You were seen by Lucas Brock MD.      Follow-up Information     Follow up with Robin Segovia MD.    Specialty:  Family Practice    Why:  If symptoms worsen, As needed    Contact information:    [facility]  [address] [facility], [place] [address]  [phone]          Discharge Instructions         Migraines and Cluster Headaches  Migraines and cluster headaches cause intense, throbbing pain on one side of the head. With a migraine, you may have nausea and vomiting and be sensitive to light and sound. You may also have warning signs, such as flashing lights or loss of parts of your vision, before the pain starts. This is called an aura. Migraines are 3 times more common in women than men. This may be due to hormonal changes during menstruation. Typical migraines may last for 4 to 72 hours untreated.  Cluster headaches recur in groups for days, weeks, or months. The pain is centered around or behind one eye. The eye may also become red or teary, or the eyelid may droop. Migraines and cluster headaches can have many triggers.    Preventing migraines and cluster headaches  Try the following steps:    Don't eat aged cheeses, nuts, beans, chocolate, red wine, or foods that contain caffeine, alcohol, tobacco, nitrates, and MSG.    Try not to skip meals.    Don t work in poor lighting.    Reduce stress as much as you can.    Get plenty of sleep each night.    Exercise regularly under your doctor s guidance.    Don't take headache medicines for more than 3 days, because of the risk of rebound  headaches.    Don't take certain prescription medicines that are known to cause rebound headaches.  Relieving the pain  Try these suggestions:    Stay quiet and rest.    Use cold to numb the pain. Wrap ice or a cold can of soda in a cloth. Hold it against the site of pain for 10 minutes. Repeat every 20 minutes.    Stay out of the light. Wear dark glasses, turn out lights, and close the curtains. When outdoors, wear a brimmed hat.    Drink lots of fluids. Sip caffeine-free flat soda to help relieve nausea.    See your doctor if you get migraines or cluster headaches often. There are effective medicines to help treat or prevent them.    Hormone therapy. This may help women whose migraines are related to hormonal changes during menstruation.  Date Last Reviewed: 12/1/2017 2000-2017 [employer]. [address] [place] [address], [place] [address]. All rights reserved. This information is not intended as a substitute for professional medical care. Always follow your healthcare professional's instructions.             Review of your medicines      Our records show that you are taking the medicines listed below. If these are incorrect, please call your family doctor or clinic.        Dose / Directions Last dose taken    acetaminophen-codeine 300-30 MG per tablet   Commonly known as:  TYLENOL WITH CODEINE #3   Quantity:  60 tablet        1-2 tabs up to twice daily as needed.   Refills:  0        aspirin-acetaminophen-caffeine 250-250-65 MG per tablet   Commonly known as:  EXCEDRIN MIGRAINE   Dose:  1 tablet        Take 1 tablet by mouth every 6 hours as needed for headaches   Refills:  0        clonazePAM 1 MG tablet   Commonly known as:  klonoPIN   Dose:  1 mg        Take 1 mg by mouth 2 times daily as needed   Refills:  0        IBUPROFEN PO   Dose:  800 mg        Take 800 mg by mouth every 8 hours as needed   Refills:  0        MULTIVITAMIN ADULT PO        Take by mouth daily   Refills:  0        SUMAtriptan 100  "MG tablet   Commonly known as:  IMITREX   Dose:  100 mg   Quantity:  9 tablet        Take 1 tablet (100 mg) by mouth at onset of headache for migraine   Refills:  0        ZANAFLEX PO        Take by mouth every 6 hours as needed for muscle spasms   Refills:  0                Orders Needing Specimen Collection     None      Pending Results     No orders found from 2018 to 2018.            Pending Culture Results     No orders found from 2018 to 2018.            Thank you for choosing [facility]       Thank you for choosing [facility] for your care. Our goal is always to provide you with excellent care. Hearing back from our patients is one way we can continue to improve our services. Please take a few minutes to complete the written survey that you may receive in the mail after you visit with us. Thank you!        [employer] Information     [facility] lets you send messages to your doctor, view your test results, renew your prescriptions, schedule appointments and more. To sign up, go to [email or web address] . Click on \"Log in\" on the left side of the screen, which will take you to the Welcome page. Then click on \"Sign up Now\" on the right side of the page.     You will be asked to enter the access code listed below, as well as some personal information. Please follow the directions to create your username and password.     Your access code is: [email or web address]  Expires: 2018  9:27 PM     Your access code will  in 90 days. If you need help or a new code, please call your [facility] clinic or [phone].        Care EveryWhere ID     This is your Care EveryWhere ID. This could be used by other organizations to access your [facility] medical records  [id number]        Equal Access to Services     KING TREVINO : lukas Badillo, erik santiago. So [facility] [phone].    ATENCIÓN: Si danyellela espstefan, tiene a baltazar " disposición servicios gratuitos de asistencia lingüística. Nataliia al [phone].    We comply with applicable federal civil rights laws and [place] laws. We do not discriminate on the basis of race, color, national origin, age, disability, sex, sexual orientation, or gender identity.            After Visit Summary       This is your record. Keep this with you and show to your community pharmacist(s) and doctor(s) at your next visit.                   159

## 2021-11-05 ENCOUNTER — HOSPITAL ENCOUNTER (OUTPATIENT)
Dept: MRI IMAGING | Facility: HOSPITAL | Age: 46
Discharge: HOME | End: 2021-11-05
Attending: FAMILY MEDICINE | Admitting: FAMILY MEDICINE
Payer: COMMERCIAL

## 2021-11-05 DIAGNOSIS — R51.9 RECURRENT HEADACHE: ICD-10-CM

## 2021-11-05 PROCEDURE — 255N000002 HC RX 255 OP 636: Performed by: RADIOLOGY

## 2021-11-05 PROCEDURE — A9585 GADOBUTROL INJECTION: HCPCS | Performed by: RADIOLOGY

## 2021-11-05 PROCEDURE — 70553 MRI BRAIN STEM W/O & W/DYE: CPT

## 2021-11-05 RX ORDER — GADOBUTROL 604.72 MG/ML
2 INJECTION INTRAVENOUS ONCE
Status: COMPLETED | OUTPATIENT
Start: 2021-11-05 | End: 2021-11-05

## 2021-11-05 RX ADMIN — GADOBUTROL 2 ML: 604.72 INJECTION INTRAVENOUS at 08:09

## 2021-11-05 RX ADMIN — GADOBUTROL 2 ML: 604.72 INJECTION INTRAVENOUS at 08:10

## 2021-11-28 ENCOUNTER — HEALTH MAINTENANCE LETTER (OUTPATIENT)
Age: 46
End: 2021-11-28

## 2022-01-03 ENCOUNTER — HOSPITAL ENCOUNTER (EMERGENCY)
Facility: HOSPITAL | Age: 47
Discharge: HOME | End: 2022-01-03
Attending: STUDENT IN AN ORGANIZED HEALTH CARE EDUCATION/TRAINING PROGRAM | Admitting: STUDENT IN AN ORGANIZED HEALTH CARE EDUCATION/TRAINING PROGRAM
Payer: COMMERCIAL

## 2022-01-03 VITALS
SYSTOLIC BLOOD PRESSURE: 117 MMHG | OXYGEN SATURATION: 100 % | RESPIRATION RATE: 16 BRPM | HEART RATE: 71 BPM | TEMPERATURE: 98.3 F | DIASTOLIC BLOOD PRESSURE: 81 MMHG

## 2022-01-03 DIAGNOSIS — G43.809 OTHER MIGRAINE WITHOUT STATUS MIGRAINOSUS, NOT INTRACTABLE: ICD-10-CM

## 2022-01-03 PROCEDURE — 250N000012 HC RX MED GY IP 250 OP 636 PS 637: Performed by: STUDENT IN AN ORGANIZED HEALTH CARE EDUCATION/TRAINING PROGRAM

## 2022-01-03 PROCEDURE — 99283 EMERGENCY DEPT VISIT LOW MDM: CPT | Performed by: STUDENT IN AN ORGANIZED HEALTH CARE EDUCATION/TRAINING PROGRAM

## 2022-01-03 PROCEDURE — 96372 THER/PROPH/DIAG INJ SC/IM: CPT | Performed by: STUDENT IN AN ORGANIZED HEALTH CARE EDUCATION/TRAINING PROGRAM

## 2022-01-03 PROCEDURE — 99283 EMERGENCY DEPT VISIT LOW MDM: CPT

## 2022-01-03 RX ORDER — SUMATRIPTAN 6 MG/.5ML
6 INJECTION, SOLUTION SUBCUTANEOUS ONCE
Status: COMPLETED | OUTPATIENT
Start: 2022-01-03 | End: 2022-01-03

## 2022-01-03 RX ADMIN — SUMATRIPTAN 6 MG: 6 INJECTION, SOLUTION SUBCUTANEOUS at 07:36

## 2022-01-03 NOTE — DISCHARGE INSTRUCTIONS
Return to the emergency department if you have worsening symptoms or new concerning symptoms, please follow-up with your primary care provider within the next week if you have any ongoing concerns.  Call to schedule appointment.

## 2022-01-03 NOTE — ED TRIAGE NOTES
Migraine - reports that it started yesterday and has not improved. Sensitive to light, sound, and motion. Denies nausea. Would like a shot of Imitrex reports that she forgot to fill hers.

## 2022-01-03 NOTE — ED PROVIDER NOTES
History     Chief Complaint   Patient presents with     Headache     HPI  Venu Amaya is a 46 year old female with history of migraines presenting here today complaining of headache that started yesterday, came on gradually, feels like all of her other migraines, is not as bad as previous migraines, was not maximal in onset, she did not strike her head, she is not on blood thinners, she has not been having fevers, she denies neck pain, states she is able to move her head around well.  Denies any numbness tingling weakness, just feels light sensitivity sound sensitivity headache and nausea.  Hasn't been vomiting.    Allergies:  Allergies   Allergen Reactions     Amoxicillin Swelling     Hands swell      Levofloxacin Other (See Comments)     Unknown reaction - Levaquin      Other [No Clinical Screening - See Comments]      utapar drop in blood pressure     Pcn [Penicillins]      Ritodrine        Problem List:    Patient Active Problem List    Diagnosis Date Noted     S/P cervical spinal fusion 03/29/2018     Priority: Medium     Neural foraminal stenosis of cervical spine 10/05/2017     Priority: Medium     Opioid dependence (H) 12/23/2015     Priority: Medium     Long term (current) use of opiate analgesic 12/23/2015     Priority: Medium     Chronic low back pain 11/02/2015     Priority: Medium     Overview:   IMO Update       Headache 10/23/2014     Priority: Medium     Overview:   10/23/14: According to neurology consultation of 9/2/14, previous treatments  include pretty much all of the triptans, Maxalt, Imitrex, Amerge, Axert, Frova, and Zomig. She reports that she has tried these when she did not have a constant headache and they did not work when she took them at first onset nor do they work now. She recently tried Imitrex injections and has tried a nasal spray in the past without improvement. The patient does take Compazine for the nausea and reports that that helps somewhat. She has tried numerous  preventative medications as well including Topamax, Depakote, gabapentin, Tegretol, Dilantin, nortriptyline, propranolol, and verapamil per her report. She has tried physical therapy as well as trigger injections and denies that any of these things are helpful. She does report she takes ibuprofen 800 mg twice per day for ankylosing spondylitis. For her headaches, the only thing she has found that helped is Tylenol with Codeine.        Cervicalgia 05/15/2014     Priority: Medium     Myalgia and myositis 05/15/2014     Priority: Medium     Abnormal pap 07/30/2013     Priority: Medium     Ankylosing spondylitis (H) 05/24/2012     Priority: Medium     Flori AN        Dysthymic disorder 09/13/2010     Priority: Medium     Anxiety state 06/02/2009     Priority: Medium     Problem list name updated by automated process. Provider to review       Depressive disorder, not elsewhere classified 10/31/2007     Priority: Medium     Migraine 01/15/2003     Priority: Medium     Problem list name updated by automated process. Provider to review          Past Medical History:    Past Medical History:   Diagnosis Date     Abnormal Pap smear and cervical HPV (human papillo 05/25/2011     Ankylosing spondylitis (H) 05/24/2012     Anxiety state, unspecified 06/02/2009     Cervicalgia pain (neck) 11/03/2011     Chronic Headache disorder  05/25/2011     Depressive disorder, not elsewhere classified 10/31/2007     Dysmenorrhea 04/03/2012     Habitual aborter, antepartum condition or complica 02/10/2005     Inguinal hernia without mention of obstruction or gangrene, unilateral or unspecified, (not specified as recurrent) 06/16/2008     Irregular menstrual cycle 05/25/2011     Mental disorders of mother, postpartum 10/06/2005     Migraine, unspecified, without mention of intractable migraine without mention of status migrainosus 01/15/2003     Moderate dysplasia of cervix 11/03/2011     Pain in thoracic spine 11/03/2011      Scoliosis (and kyphoscoliosis), idiopathic 2011       Past Surgical History:    Past Surgical History:   Procedure Laterality Date     HEAD & NECK SURGERY       inguinal hernia repair, Germain repair  10/14/2008    RT      TUBAL LIGATION         Family History:    Family History   Problem Relation Age of Onset     Cancer Mother         lymphoma     Cancer Other      Cancer Other      Colon Cancer Sister        Social History:  Marital Status:   [2]  Social History     Tobacco Use     Smoking status: Current Some Day Smoker     Packs/day: 0.00     Types: Cigarettes     Last attempt to quit: 2015     Years since quittin.4     Smokeless tobacco: Never Used     Tobacco comment: Passive Exposure: Yes    Substance Use Topics     Alcohol use: No     Drug use: No        Medications:    acetaminophen-codeine (TYLENOL/CODEINE #3) 300-30 MG per tablet  Multiple Vitamins-Minerals (MULTIVITAMIN ADULT PO)  SUMAtriptan (IMITREX) 100 MG tablet  TiZANidine HCl (ZANAFLEX PO)  IBUPROFEN PO          Review of Systems  A complete review of systems was performed and is otherwise negative.     Physical Exam   BP: 113/84  Pulse: 90  Temp: 98.3  F (36.8  C)  Resp: 16  SpO2: 100 %      Physical Exam  Constitutional: Alert and conversant. NAD   HENT: NCAT   Eyes: Normal pupils   Neck: supple   CV: No pallor  Pulmonary/Chest: Non-labored respirations  Abdominal: non-distended   MSK: MORALES.   Neuro: Neuro: Alert and appropriate, CN 2-12 intact, Strength 5/5 and symmetric in the upper and lower extremities, SILT, normal gait, normal rapid alternating movements   Skin: Warm and dry. No diaphoresis. No rashes on exposed skin    Psych: Appropriate mood and affect       ED Course              ED Course as of 22 0723      0720 Venu Amaya is a 46 year old female presenting with headache.     Vitals wnl   Exam normal, non focal     Differential includes but is not limited to SAH/ICH, Trauma/fx,  venous sinus thrombosis, pseudotumor cerebri, meningitis/encephalitis/intracranial abscess, post LP headache, Giant cell arteritis, mass effect, CO toxicity, migrane, tension HA, cluster HA, hypoglycemia, dehydration.      Given the patient's history of similar headaches without signs or symptoms suggestive of concerning intracranial pathology, and benign neurologic exam, the etiology of this patient's headache is likely a primary headache disorder and does not require further emergent diagnostic evaluation. This was treated in the emergency department with imitrex at the patient's request. The patient was discharged with plan to  her imitrex which she forgot t o fill and recommendations to follow up with a primary care provider. Patient given instructions on follow-up and warning signs for which to return to ED. All questions were answered and the patient is comfortable with plan for discharge. The patient was discharged in stable condition with follow up with PCP as needed.        Procedures         No results found for this or any previous visit (from the past 24 hour(s)).    Medications   SUMAtriptan (IMITREX) injection 6 mg (has no administration in time range)       Assessments & Plan (with Medical Decision Making)     I have reviewed the nursing notes.    I have reviewed the findings, diagnosis, plan and need for follow up with the patient.    New Prescriptions    No medications on file       Final diagnoses:   Other migraine without status migrainosus, not intractable       1/3/2022   [facility]     Robbie Alcantara MD  01/03/22 9020

## 2022-07-16 NOTE — DISCHARGE INSTRUCTIONS
Take tylenol and/or ibuprofen for pain. Follow dosing on package.   Increase fluid intake.   Follow up with PCP as scheduled on 5-7-18.   Return to urgent care or emergency department with any increase in symptoms or concerns.    -mild thrombocytopenia noted  -check b12, folate, hep c  -check peripheral smear if continues to downtrend  -hold aspirin for now

## 2022-07-19 ENCOUNTER — MYC REFILL (OUTPATIENT)
Dept: FAMILY MEDICINE | Facility: OTHER | Age: 47
End: 2022-07-19

## 2022-07-19 DIAGNOSIS — G43.719 CHRONIC MIGRAINE WITHOUT AURA, WITH INTRACTABLE MIGRAINE, SO STATED, WITHOUT MENTION OF STATUS MIGRAINOSUS: ICD-10-CM

## 2022-07-19 RX ORDER — ACETAMINOPHEN AND CODEINE PHOSPHATE 300; 30 MG/1; MG/1
TABLET ORAL
Qty: 60 TABLET | Refills: 0 | OUTPATIENT
Start: 2022-07-19

## 2022-07-26 DIAGNOSIS — R51.9 DAILY HEADACHE: Primary | ICD-10-CM

## 2022-07-26 RX ORDER — GABAPENTIN 300 MG/1
300 CAPSULE ORAL 3 TIMES DAILY
Qty: 30 CAPSULE | Refills: 1 | Status: SHIPPED | OUTPATIENT
Start: 2022-07-26

## 2022-08-31 ENCOUNTER — HOSPITAL ENCOUNTER (EMERGENCY)
Facility: HOSPITAL | Age: 47
Discharge: HOME | End: 2022-08-31
Attending: INTERNAL MEDICINE | Admitting: INTERNAL MEDICINE
Payer: COMMERCIAL

## 2022-08-31 VITALS
HEART RATE: 93 BPM | DIASTOLIC BLOOD PRESSURE: 89 MMHG | RESPIRATION RATE: 16 BRPM | SYSTOLIC BLOOD PRESSURE: 136 MMHG | TEMPERATURE: 99.1 F | OXYGEN SATURATION: 99 %

## 2022-08-31 DIAGNOSIS — K08.89 PAIN, DENTAL: ICD-10-CM

## 2022-08-31 PROCEDURE — 250N000013 HC RX MED GY IP 250 OP 250 PS 637: Performed by: INTERNAL MEDICINE

## 2022-08-31 PROCEDURE — 99283 EMERGENCY DEPT VISIT LOW MDM: CPT

## 2022-08-31 PROCEDURE — 99283 EMERGENCY DEPT VISIT LOW MDM: CPT | Performed by: INTERNAL MEDICINE

## 2022-08-31 RX ORDER — OXYCODONE HYDROCHLORIDE 5 MG/1
10 TABLET ORAL ONCE
Status: COMPLETED | OUTPATIENT
Start: 2022-08-31 | End: 2022-08-31

## 2022-08-31 RX ORDER — HYDROXYZINE HYDROCHLORIDE 25 MG/1
25 TABLET, FILM COATED ORAL 3 TIMES DAILY PRN
COMMUNITY

## 2022-08-31 RX ORDER — NAPROXEN 500 MG/1
500 TABLET ORAL 2 TIMES DAILY WITH MEALS
Qty: 6 TABLET | Refills: 0 | Status: SHIPPED | OUTPATIENT
Start: 2022-08-31 | End: 2022-09-03

## 2022-08-31 RX ORDER — CLINDAMYCIN HCL 300 MG
300 CAPSULE ORAL 4 TIMES DAILY
Qty: 20 CAPSULE | Refills: 0 | Status: SHIPPED | OUTPATIENT
Start: 2022-08-31 | End: 2022-09-05

## 2022-08-31 RX ORDER — CLINDAMYCIN HCL 150 MG
300 CAPSULE ORAL ONCE
Status: COMPLETED | OUTPATIENT
Start: 2022-08-31 | End: 2022-08-31

## 2022-08-31 RX ADMIN — CLINDAMYCIN HYDROCHLORIDE 300 MG: 150 CAPSULE ORAL at 02:52

## 2022-08-31 RX ADMIN — OXYCODONE HYDROCHLORIDE 10 MG: 5 TABLET ORAL at 02:52

## 2022-08-31 ASSESSMENT — ENCOUNTER SYMPTOMS
ABDOMINAL PAIN: 0
SHORTNESS OF BREATH: 0
FEVER: 0

## 2022-08-31 ASSESSMENT — ACTIVITIES OF DAILY LIVING (ADL): ADLS_ACUITY_SCORE: 35

## 2022-08-31 NOTE — ED TRIAGE NOTES
Patient states right upper tooth pain began yesterday, increased pain throughout day and night. Reports she has been taking ibuprofen Q 6 hours, had last dose on her way here. 10/10 pain.     Triage Assessment     Row Name 08/31/22 0238       Triage Assessment (Adult)    Airway WDL WDL       Cardiac WDL    Cardiac WDL WDL

## 2022-08-31 NOTE — ED PROVIDER NOTES
History     Chief Complaint   Patient presents with     Dental Pain     The history is provided by the patient.   Dental Pain  Location:  Upper  Upper teeth location:  1/RU 3rd molar  Quality:  Aching  Severity:  Severe  Onset quality:  Gradual  Timing:  Constant  Chronicity:  Recurrent  Associated symptoms: no fever        Allergies:  Allergies   Allergen Reactions     Amoxicillin Swelling     Hands swell      Levofloxacin Other (See Comments)     Unknown reaction - Levaquin      Other [No Clinical Screening - See Comments]      utapar drop in blood pressure     Pcn [Penicillins]      Ritodrine        Problem List:    Patient Active Problem List    Diagnosis Date Noted     S/P cervical spinal fusion 03/29/2018     Priority: Medium     Neural foraminal stenosis of cervical spine 10/05/2017     Priority: Medium     Opioid dependence (H) 12/23/2015     Priority: Medium     Long term (current) use of opiate analgesic 12/23/2015     Priority: Medium     Chronic low back pain 11/02/2015     Priority: Medium     Overview:   IMO Update       Headache 10/23/2014     Priority: Medium     Overview:   10/23/14: According to neurology consultation of 9/2/14, previous treatments  include pretty much all of the triptans, Maxalt, Imitrex, Amerge, Axert, Frova, and Zomig. She reports that she has tried these when she did not have a constant headache and they did not work when she took them at first onset nor do they work now. She recently tried Imitrex injections and has tried a nasal spray in the past without improvement. The patient does take Compazine for the nausea and reports that that helps somewhat. She has tried numerous preventative medications as well including Topamax, Depakote, gabapentin, Tegretol, Dilantin, nortriptyline, propranolol, and verapamil per her report. She has tried physical therapy as well as trigger injections and denies that any of these things are helpful. She does report she takes ibuprofen 800 mg  twice per day for ankylosing spondylitis. For her headaches, the only thing she has found that helped is Tylenol with Codeine.        Cervicalgia 05/15/2014     Priority: Medium     Myalgia and myositis 05/15/2014     Priority: Medium     Abnormal pap 07/30/2013     Priority: Medium     Ankylosing spondylitis (H) 05/24/2012     Priority: Medium     Flori AN        Dysthymic disorder 09/13/2010     Priority: Medium     Anxiety state 06/02/2009     Priority: Medium     Problem list name updated by automated process. Provider to review       Depressive disorder, not elsewhere classified 10/31/2007     Priority: Medium     Migraine 01/15/2003     Priority: Medium     Problem list name updated by automated process. Provider to review          Past Medical History:    Past Medical History:   Diagnosis Date     Abnormal Pap smear and cervical HPV (human papillo 05/25/2011     Ankylosing spondylitis (H) 05/24/2012     Anxiety state, unspecified 06/02/2009     Cervicalgia pain (neck) 11/03/2011     Chronic Headache disorder  05/25/2011     Depressive disorder, not elsewhere classified 10/31/2007     Dysmenorrhea 04/03/2012     Habitual aborter, antepartum condition or complica 02/10/2005     Inguinal hernia without mention of obstruction or gangrene, unilateral or unspecified, (not specified as recurrent) 06/16/2008     Irregular menstrual cycle 05/25/2011     Mental disorders of mother, postpartum 10/06/2005     Migraine, unspecified, without mention of intractable migraine without mention of status migrainosus 01/15/2003     Moderate dysplasia of cervix 11/03/2011     Pain in thoracic spine 11/03/2011     Scoliosis (and kyphoscoliosis), idiopathic 05/16/2011       Past Surgical History:    Past Surgical History:   Procedure Laterality Date     HEAD & NECK SURGERY       inguinal hernia repair, Germain repair  10/14/2008    RT      TUBAL LIGATION         Family History:    Family History   Problem Relation  Age of Onset     Cancer Mother         lymphoma     Cancer Other      Cancer Other      Colon Cancer Sister        Social History:  Marital Status:   [2]  Social History     Tobacco Use     Smoking status: Current Some Day Smoker     Packs/day: 0.00     Types: Cigarettes     Last attempt to quit: 2015     Years since quittin.1     Smokeless tobacco: Never Used     Tobacco comment: Passive Exposure: Yes    Substance Use Topics     Alcohol use: No     Drug use: No        Medications:    clindamycin (CLEOCIN) 300 MG capsule  gabapentin (NEURONTIN) 300 MG capsule  hydrOXYzine (ATARAX) 25 MG tablet  IBUPROFEN PO  Multiple Vitamins-Minerals (MULTIVITAMIN ADULT PO)  naproxen (NAPROSYN) 500 MG tablet  SUMAtriptan (IMITREX) 100 MG tablet  TiZANidine HCl (ZANAFLEX PO)  acetaminophen-codeine (TYLENOL/CODEINE #3) 300-30 MG per tablet          Review of Systems   Constitutional: Negative for fever.   Respiratory: Negative for shortness of breath.    Cardiovascular: Negative for chest pain.   Gastrointestinal: Negative for abdominal pain.   All other systems reviewed and are negative.      Physical Exam   BP: 136/89  Pulse: 93  Temp: 99.1  F (37.3  C)  Resp: 16  SpO2: 99 %      Physical Exam  Constitutional:       General: She is not in acute distress.     Appearance: She is not diaphoretic.   HENT:      Head: Atraumatic.      Mouth/Throat:      Mouth: Mucous membranes are moist.      Dentition: Gingival swelling and dental caries present.      Tongue: Tongue does not deviate from midline.      Pharynx: Uvula midline. No oropharyngeal exudate or uvula swelling.   Eyes:      General: No scleral icterus.     Pupils: Pupils are equal, round, and reactive to light.   Cardiovascular:      Heart sounds: Normal heart sounds.   Pulmonary:      Effort: No respiratory distress.      Breath sounds: Normal breath sounds.   Abdominal:      General: Bowel sounds are normal.      Palpations: Abdomen is soft.      Tenderness:  There is no abdominal tenderness.   Musculoskeletal:         General: No tenderness.   Skin:     General: Skin is warm.      Findings: No rash.         ED Course                 Procedures                No results found for this or any previous visit (from the past 24 hour(s)).    Medications   oxyCODONE (ROXICODONE) tablet 10 mg (10 mg Oral Given 8/31/22 0252)   clindamycin (CLEOCIN) capsule 300 mg (300 mg Oral Given 8/31/22 0252)       Assessments & Plan (with Medical Decision Making)   Dental pain and infection  Took Ibprofen at home, 1 dose of oxycodone given in ER  Clindamycin started  D C home, follow-up with dental clinic  I have reviewed the nursing notes.    I have reviewed the findings, diagnosis, plan and need for follow up with the patient.      Discharge Medication List as of 8/31/2022  2:55 AM      START taking these medications    Details   clindamycin (CLEOCIN) 300 MG capsule Take 1 capsule (300 mg) by mouth 4 times daily for 5 days, Disp-20 capsule, R-0, E-Prescribe      naproxen (NAPROSYN) 500 MG tablet Take 1 tablet (500 mg) by mouth 2 times daily (with meals) for 3 days, Disp-6 tablet, R-0, E-Prescribe             Final diagnoses:   Pain, dental       8/31/2022   [facility]     Abdoulaye Garcia MD  09/02/22 [id number]

## 2022-09-04 ENCOUNTER — HEALTH MAINTENANCE LETTER (OUTPATIENT)
Age: 47
End: 2022-09-04

## 2022-10-04 ENCOUNTER — HOSPITAL ENCOUNTER (EMERGENCY)
Facility: HOSPITAL | Age: 47
Discharge: HOME | End: 2022-10-04
Attending: PHYSICIAN ASSISTANT | Admitting: PHYSICIAN ASSISTANT
Payer: MEDICAID

## 2022-10-04 VITALS
HEART RATE: 89 BPM | TEMPERATURE: 98.8 F | DIASTOLIC BLOOD PRESSURE: 88 MMHG | OXYGEN SATURATION: 99 % | SYSTOLIC BLOOD PRESSURE: 126 MMHG | RESPIRATION RATE: 16 BRPM

## 2022-10-04 DIAGNOSIS — K08.89 PAIN, DENTAL: ICD-10-CM

## 2022-10-04 PROCEDURE — 250N000013 HC RX MED GY IP 250 OP 250 PS 637: Performed by: PHYSICIAN ASSISTANT

## 2022-10-04 PROCEDURE — G0463 HOSPITAL OUTPT CLINIC VISIT: HCPCS

## 2022-10-04 PROCEDURE — 99213 OFFICE O/P EST LOW 20 MIN: CPT | Performed by: PHYSICIAN ASSISTANT

## 2022-10-04 RX ORDER — CLINDAMYCIN HCL 300 MG
300 CAPSULE ORAL 3 TIMES DAILY
Qty: 21 CAPSULE | Refills: 0 | Status: SHIPPED | OUTPATIENT
Start: 2022-10-04 | End: 2022-10-11

## 2022-10-04 RX ORDER — CHLORHEXIDINE GLUCONATE ORAL RINSE 1.2 MG/ML
15 SOLUTION DENTAL 2 TIMES DAILY
Qty: 118 ML | Refills: 0 | Status: SHIPPED | OUTPATIENT
Start: 2022-10-04 | End: 2024-04-17

## 2022-10-04 RX ORDER — OXYCODONE HYDROCHLORIDE 5 MG/1
5 TABLET ORAL ONCE
Status: COMPLETED | OUTPATIENT
Start: 2022-10-04 | End: 2022-10-04

## 2022-10-04 RX ADMIN — OXYCODONE HYDROCHLORIDE 5 MG: 5 TABLET ORAL at 15:45

## 2022-10-04 ASSESSMENT — ENCOUNTER SYMPTOMS: FEVER: 0

## 2022-10-04 NOTE — ED TRIAGE NOTES
Patient presents with c/o dental pain on bottom left side that started Sunday. Patient reports taking Ibuprofen and is no longer helping pain,.

## 2022-10-04 NOTE — ED TRIAGE NOTES
Pt presents with bottom left mouth pain. Pt states tooth broke a few months ago and pain has been increasing over time. Pt states is a few weeks out to see a dentist. Current pain is 10/10. Pt has been taking ibuprofen with little relief.

## 2022-10-04 NOTE — ED PROVIDER NOTES
History     Chief Complaint   Patient presents with     Dental Pain     HPI  Venu Amaya is a 47 year old female who presents to urgent care with chief complaint of dental pain.  Patient states that she has a tooth located over the lower left aspect of her mouth that broken off over this past month and then over the past couple days has been giving her significant pain.  She denies any fevers, bad taste in her mouth, or any other associated symptoms.  Patient states that she has an upcoming dental appointment in approximately 2 weeks.  Patient has been taking 800 mg ibuprofen every 6 hours which has not helped with pain.    Allergies:  Allergies   Allergen Reactions     Amoxicillin Swelling     Hands swell      Levofloxacin Other (See Comments)     Unknown reaction - Levaquin      Other [No Clinical Screening - See Comments]      utapar drop in blood pressure     Pcn [Penicillins]      Ritodrine        Problem List:    Patient Active Problem List    Diagnosis Date Noted     S/P cervical spinal fusion 03/29/2018     Priority: Medium     Neural foraminal stenosis of cervical spine 10/05/2017     Priority: Medium     Opioid dependence (H) 12/23/2015     Priority: Medium     Long term (current) use of opiate analgesic 12/23/2015     Priority: Medium     Chronic low back pain 11/02/2015     Priority: Medium     Overview:   IMO Update       Headache 10/23/2014     Priority: Medium     Overview:   10/23/14: According to neurology consultation of 9/2/14, previous treatments  include pretty much all of the triptans, Maxalt, Imitrex, Amerge, Axert, Frova, and Zomig. She reports that she has tried these when she did not have a constant headache and they did not work when she took them at first onset nor do they work now. She recently tried Imitrex injections and has tried a nasal spray in the past without improvement. The patient does take Compazine for the nausea and reports that that helps somewhat. She has tried  numerous preventative medications as well including Topamax, Depakote, gabapentin, Tegretol, Dilantin, nortriptyline, propranolol, and verapamil per her report. She has tried physical therapy as well as trigger injections and denies that any of these things are helpful. She does report she takes ibuprofen 800 mg twice per day for ankylosing spondylitis. For her headaches, the only thing she has found that helped is Tylenol with Codeine.        Cervicalgia 05/15/2014     Priority: Medium     Myalgia and myositis 05/15/2014     Priority: Medium     Abnormal pap 07/30/2013     Priority: Medium     Ankylosing spondylitis (H) 05/24/2012     Priority: Medium     Flori AN        Dysthymic disorder 09/13/2010     Priority: Medium     Anxiety state 06/02/2009     Priority: Medium     Problem list name updated by automated process. Provider to review       Depressive disorder, not elsewhere classified 10/31/2007     Priority: Medium     Migraine 01/15/2003     Priority: Medium     Problem list name updated by automated process. Provider to review          Past Medical History:    Past Medical History:   Diagnosis Date     Abnormal Pap smear and cervical HPV (human papillo 05/25/2011     Ankylosing spondylitis (H) 05/24/2012     Anxiety state, unspecified 06/02/2009     Cervicalgia pain (neck) 11/03/2011     Chronic Headache disorder  05/25/2011     Depressive disorder, not elsewhere classified 10/31/2007     Dysmenorrhea 04/03/2012     Habitual aborter, antepartum condition or complica 02/10/2005     Inguinal hernia without mention of obstruction or gangrene, unilateral or unspecified, (not specified as recurrent) 06/16/2008     Irregular menstrual cycle 05/25/2011     Mental disorders of mother, postpartum 10/06/2005     Migraine, unspecified, without mention of intractable migraine without mention of status migrainosus 01/15/2003     Moderate dysplasia of cervix 11/03/2011     Pain in thoracic spine 11/03/2011      Scoliosis (and kyphoscoliosis), idiopathic 2011       Past Surgical History:    Past Surgical History:   Procedure Laterality Date     HEAD & NECK SURGERY       inguinal hernia repair, Germain repair  10/14/2008    RT      TUBAL LIGATION         Family History:    Family History   Problem Relation Age of Onset     Cancer Mother         lymphoma     Cancer Other      Cancer Other      Colon Cancer Sister        Social History:  Marital Status:   [2]  Social History     Tobacco Use     Smoking status: Current Some Day Smoker     Packs/day: 0.00     Types: Cigarettes     Last attempt to quit: 2015     Years since quittin.2     Smokeless tobacco: Never Used     Tobacco comment: Passive Exposure: Yes    Substance Use Topics     Alcohol use: No     Drug use: No        Medications:    chlorhexidine (PERIDEX) 0.12 % solution  clindamycin (CLEOCIN) 300 MG capsule  acetaminophen-codeine (TYLENOL/CODEINE #3) 300-30 MG per tablet  gabapentin (NEURONTIN) 300 MG capsule  hydrOXYzine (ATARAX) 25 MG tablet  IBUPROFEN PO  Multiple Vitamins-Minerals (MULTIVITAMIN ADULT PO)  SUMAtriptan (IMITREX) 100 MG tablet  TiZANidine HCl (ZANAFLEX PO)          Review of Systems   Constitutional: Negative for fever.   HENT: Positive for dental problem.    All other systems reviewed and are negative.      Physical Exam   BP: 126/88  Pulse: 89  Temp: 98.8  F (37.1  C)  Resp: 16  SpO2: 99 %      Physical Exam  Vitals and nursing note reviewed.   Constitutional:       General: She is not in acute distress.     Appearance: Normal appearance. She is not ill-appearing or toxic-appearing.   HENT:      Mouth/Throat:        Comments: Tooth #21, is cracked in half, and the superior portion of the tooth appears to be missing.  There is pain with palpation here.  No obvious dental abscess.  Cardiovascular:      Rate and Rhythm: Regular rhythm.      Heart sounds: Normal heart sounds.   Pulmonary:      Breath sounds: Normal breath  sounds.   Neurological:      Mental Status: She is alert and oriented to person, place, and time.         ED Course                 Procedures             Critical Care time:               No results found for this or any previous visit (from the past 24 hour(s)).    Medications   oxyCODONE (ROXICODONE) tablet 5 mg (5 mg Oral Given 10/4/22 4235)       Assessments & Plan (with Medical Decision Making)   #1.  Dental pain    Discussed exam findings with patient.  Patient is prescribed course of clindamycin which she has tolerated well in the past.  She is encouraged to take probiotic while taking this antibiotic.  She is also given prescription for Peridex mouthwash.  Patient was given 1 oxycodone 5 mg p.o. here in the urgent care.  Patient is instructed to rotate Tylenol and ibuprofen as directed for pain.  She should follow-up with dentist as soon as possible.  Any additional concerns the meantime patient can return to urgent care or follow-up with primary care provider.  Patient verbalized understanding and agreement of plan.    I have reviewed the nursing notes.    I have reviewed the findings, diagnosis, plan and need for follow up with the patient.    New Prescriptions    CHLORHEXIDINE (PERIDEX) 0.12 % SOLUTION    Swish and spit 15 mLs in mouth 2 times daily    CLINDAMYCIN (CLEOCIN) 300 MG CAPSULE    Take 1 capsule (300 mg) by mouth 3 times daily for 7 days       Final diagnoses:   Pain, dental       10/4/2022   [facility]     Drew Fox PA-C  10/04/22 [id number]

## 2023-01-15 ENCOUNTER — HEALTH MAINTENANCE LETTER (OUTPATIENT)
Age: 48
End: 2023-01-15

## 2023-03-22 ENCOUNTER — HOSPITAL ENCOUNTER (EMERGENCY)
Facility: HOSPITAL | Age: 48
Discharge: HOME | End: 2023-03-22
Attending: STUDENT IN AN ORGANIZED HEALTH CARE EDUCATION/TRAINING PROGRAM | Admitting: STUDENT IN AN ORGANIZED HEALTH CARE EDUCATION/TRAINING PROGRAM
Payer: MEDICAID

## 2023-03-22 VITALS
DIASTOLIC BLOOD PRESSURE: 91 MMHG | RESPIRATION RATE: 20 BRPM | HEART RATE: 102 BPM | SYSTOLIC BLOOD PRESSURE: 139 MMHG | TEMPERATURE: 98 F | OXYGEN SATURATION: 100 %

## 2023-03-22 DIAGNOSIS — R10.9 LEFT FLANK PAIN: ICD-10-CM

## 2023-03-22 DIAGNOSIS — R31.9 HEMATURIA, UNSPECIFIED TYPE: ICD-10-CM

## 2023-03-22 LAB
ALBUMIN UR-MCNC: 70 MG/DL
ANION GAP SERPL CALCULATED.3IONS-SCNC: 14 MMOL/L (ref 7–15)
APPEARANCE UR: ABNORMAL
BILIRUB UR QL STRIP: NEGATIVE
BUN SERPL-MCNC: 18.9 MG/DL (ref 6–20)
CALCIUM SERPL-MCNC: 10 MG/DL (ref 8.6–10)
CHLORIDE SERPL-SCNC: 104 MMOL/L (ref 98–107)
COLOR UR AUTO: ABNORMAL
CREAT SERPL-MCNC: 0.63 MG/DL (ref 0.51–0.95)
DEPRECATED HCO3 PLAS-SCNC: 23 MMOL/L (ref 22–29)
ERYTHROCYTE [DISTWIDTH] IN BLOOD BY AUTOMATED COUNT: 13 % (ref 10–15)
GFR SERPL CREATININE-BSD FRML MDRD: >90 ML/MIN/1.73M2
GLUCOSE SERPL-MCNC: 100 MG/DL (ref 70–99)
GLUCOSE UR STRIP-MCNC: NEGATIVE MG/DL
HCG UR QL: NEGATIVE
HCT VFR BLD AUTO: 41.5 % (ref 35–47)
HGB BLD-MCNC: 13.4 G/DL (ref 11.7–15.7)
HGB UR QL STRIP: ABNORMAL
HOLD SPECIMEN: NORMAL
KETONES UR STRIP-MCNC: ABNORMAL MG/DL
LEUKOCYTE ESTERASE UR QL STRIP: ABNORMAL
MCH RBC QN AUTO: 29.3 PG (ref 26.5–33)
MCHC RBC AUTO-ENTMCNC: 32.3 G/DL (ref 31.5–36.5)
MCV RBC AUTO: 91 FL (ref 78–100)
MUCOUS THREADS #/AREA URNS LPF: PRESENT /LPF
NITRATE UR QL: NEGATIVE
PH UR STRIP: 5.5 [PH] (ref 4.7–8)
PLATELET # BLD AUTO: 177 10E3/UL (ref 150–450)
POTASSIUM SERPL-SCNC: 3.8 MMOL/L (ref 3.4–5.3)
RBC # BLD AUTO: 4.57 10E6/UL (ref 3.8–5.2)
RBC URINE: >182 /HPF
SODIUM SERPL-SCNC: 141 MMOL/L (ref 136–145)
SP GR UR STRIP: 1.03 (ref 1–1.03)
SQUAMOUS EPITHELIAL: 0 /HPF
UROBILINOGEN UR STRIP-MCNC: NORMAL MG/DL
WBC # BLD AUTO: 4.2 10E3/UL (ref 4–11)
WBC URINE: 5 /HPF

## 2023-03-22 PROCEDURE — 99283 EMERGENCY DEPT VISIT LOW MDM: CPT

## 2023-03-22 PROCEDURE — 85014 HEMATOCRIT: CPT | Performed by: STUDENT IN AN ORGANIZED HEALTH CARE EDUCATION/TRAINING PROGRAM

## 2023-03-22 PROCEDURE — 80048 BASIC METABOLIC PNL TOTAL CA: CPT | Performed by: STUDENT IN AN ORGANIZED HEALTH CARE EDUCATION/TRAINING PROGRAM

## 2023-03-22 PROCEDURE — 81001 URINALYSIS AUTO W/SCOPE: CPT | Performed by: STUDENT IN AN ORGANIZED HEALTH CARE EDUCATION/TRAINING PROGRAM

## 2023-03-22 PROCEDURE — 36415 COLL VENOUS BLD VENIPUNCTURE: CPT | Performed by: STUDENT IN AN ORGANIZED HEALTH CARE EDUCATION/TRAINING PROGRAM

## 2023-03-22 PROCEDURE — 87086 URINE CULTURE/COLONY COUNT: CPT | Performed by: STUDENT IN AN ORGANIZED HEALTH CARE EDUCATION/TRAINING PROGRAM

## 2023-03-22 PROCEDURE — 99284 EMERGENCY DEPT VISIT MOD MDM: CPT | Performed by: STUDENT IN AN ORGANIZED HEALTH CARE EDUCATION/TRAINING PROGRAM

## 2023-03-22 PROCEDURE — 81025 URINE PREGNANCY TEST: CPT | Performed by: STUDENT IN AN ORGANIZED HEALTH CARE EDUCATION/TRAINING PROGRAM

## 2023-03-22 ASSESSMENT — ACTIVITIES OF DAILY LIVING (ADL): ADLS_ACUITY_SCORE: 35

## 2023-03-22 NOTE — ED PROVIDER NOTES
ED Course as of 03/22/23 0816   Wed Mar 22, 2023   0701 Sign out given pending UA and BMP to assess for UTI and renal function. If stable, okay for discharge and close PCP follow-up in 3 days for consideration of further renal imaging and other testing.   0705 Patient signed out to me at shift change. History of ankylosing spondylitis. Had back pain for several weeks and rust colored urine today. Labs and UA pending. Low suspicion for a kidney stone--occasional aching pain for weeks. Will be able to discharge if labs OK.   0814 Labs are reassuring: Kidney function is normal and hemoglobin is normal.  Urine shows blood, does not appear consistent with infection.  Spoke with patient and she says that it is possible that her menstrual period is starting.  It would be slightly early but she does have a history of irregular periods.  She has multiple reasons for low back pain including her ankylosing spondylitis and has a history of low back pain.  Her presentation is not consistent with a kidney stone.  Recommended that she follow-up closely with her primary doctor, drink plenty of water, and if she does not note that her menstrual period starts or has worsening symptoms of pain, difficulty urinating, or other concerning symptoms that she should return right away.  She expressed understanding.          Ekle Mendoza MD  03/22/23 0816

## 2023-03-22 NOTE — ED NOTES
Pt ambulated well to ED6. Pt states her lower back and L flank hurt but is not tender to touch. States it has hurt for last few weeks and she believes she has had blood in her urine. Pt denies frequency or burning with urination. Bowel sounds active. Pt states she felt feverish but does not have a themometer to confirm. Pt denies chest pain, nausea, SOB.

## 2023-03-22 NOTE — ED NOTES
Gave detailed and through report to Ricky SOLIZ. She stated that he understood and taught back information given.

## 2023-03-22 NOTE — DISCHARGE INSTRUCTIONS
-Make sure you are drinking plenty of water.  Most adults need 1 to 2 L/day.  Urine should be light/clear  - Please return to the Emergency Room if you do not improve, feel worse, or have any new or concerning symptoms. We would especially want to see you back if you experience a fever, decreased urination, or worsening pain.  - Please follow up with a primary care physician in 2-3 days to discuss further testing such as potential imaging and to discuss any further treatment of your pain

## 2023-03-22 NOTE — ED PROVIDER NOTES
"  History     Chief Complaint   Patient presents with     Back Pain     Hematuria     Fever     HPI  Riani DEZ Amaya is a 47 year old female presenting with several weeks of intermittent left sided low back pain for several weeks. Also thinks she has had some rust colored urine. Does have chronic low back pain. Previously thought she had UTI but was seen for this years ago and not found to have anything. Does have PMH ankylosing spondylitis and anxiety as well.  states she was \"playing [employer] and became worried about her kidneys.\" Had chills but didn't check temperature. No dysuria or increased frequency. Does have hematuria. No vaginal bleeding. No blood in stool. No SOB/CP. No abdominal pain.    Allergies:  Allergies   Allergen Reactions     Amoxicillin Swelling     Hands swell      Levofloxacin Other (See Comments)     Unknown reaction - Levaquin      Other [No Clinical Screening - See Comments]      utapar drop in blood pressure     Pcn [Penicillins]      Ritodrine        Problem List:    Patient Active Problem List    Diagnosis Date Noted     S/P cervical spinal fusion 03/29/2018     Priority: Medium     Neural foraminal stenosis of cervical spine 10/05/2017     Priority: Medium     Opioid dependence (H) 12/23/2015     Priority: Medium     Long term (current) use of opiate analgesic 12/23/2015     Priority: Medium     Chronic low back pain 11/02/2015     Priority: Medium     Overview:   IMO Update       Headache 10/23/2014     Priority: Medium     Overview:   10/23/14: According to neurology consultation of 9/2/14, previous treatments  include pretty much all of the triptans, Maxalt, Imitrex, Amerge, Axert, Frova, and Zomig. She reports that she has tried these when she did not have a constant headache and they did not work when she took them at first onset nor do they work now. She recently tried Imitrex injections and has tried a nasal spray in the past without improvement. The patient does take " Compazine for the nausea and reports that that helps somewhat. She has tried numerous preventative medications as well including Topamax, Depakote, gabapentin, Tegretol, Dilantin, nortriptyline, propranolol, and verapamil per her report. She has tried physical therapy as well as trigger injections and denies that any of these things are helpful. She does report she takes ibuprofen 800 mg twice per day for ankylosing spondylitis. For her headaches, the only thing she has found that helped is Tylenol with Codeine.        Cervicalgia 05/15/2014     Priority: Medium     Myalgia and myositis 05/15/2014     Priority: Medium     Abnormal pap 07/30/2013     Priority: Medium     Ankylosing spondylitis (H) 05/24/2012     Priority: Medium     Flori Finch PAC        Dysthymic disorder 09/13/2010     Priority: Medium     Anxiety state 06/02/2009     Priority: Medium     Problem list name updated by automated process. Provider to review       Depressive disorder, not elsewhere classified 10/31/2007     Priority: Medium     Migraine 01/15/2003     Priority: Medium     Problem list name updated by automated process. Provider to review          Past Medical History:    Past Medical History:   Diagnosis Date     Abnormal Pap smear and cervical HPV (human papillo 05/25/2011     Ankylosing spondylitis (H) 05/24/2012     Anxiety state, unspecified 06/02/2009     Cervicalgia pain (neck) 11/03/2011     Chronic Headache disorder  05/25/2011     Depressive disorder, not elsewhere classified 10/31/2007     Dysmenorrhea 04/03/2012     Habitual aborter, antepartum condition or complica 02/10/2005     Inguinal hernia without mention of obstruction or gangrene, unilateral or unspecified, (not specified as recurrent) 06/16/2008     Irregular menstrual cycle 05/25/2011     Mental disorders of mother, postpartum 10/06/2005     Migraine, unspecified, without mention of intractable migraine without mention of status migrainosus 01/15/2003      Moderate dysplasia of cervix 2011     Pain in thoracic spine 2011     Scoliosis (and kyphoscoliosis), idiopathic 2011       Past Surgical History:    Past Surgical History:   Procedure Laterality Date     HEAD & NECK SURGERY       inguinal hernia repair, Germain repair  10/14/2008    RT      TUBAL LIGATION         Family History:    Family History   Problem Relation Age of Onset     Cancer Mother         lymphoma     Cancer Other      Cancer Other      Colon Cancer Sister        Social History:  Marital Status:   [2]  Social History     Tobacco Use     Smoking status: Some Days     Packs/day: 0.00     Types: Cigarettes     Last attempt to quit: 2015     Years since quittin.6     Smokeless tobacco: Never     Tobacco comments:     Passive Exposure: Yes    Substance Use Topics     Alcohol use: No     Drug use: No        Medications:    acetaminophen-codeine (TYLENOL/CODEINE #3) 300-30 MG per tablet  chlorhexidine (PERIDEX) 0.12 % solution  gabapentin (NEURONTIN) 300 MG capsule  hydrOXYzine (ATARAX) 25 MG tablet  IBUPROFEN PO  Multiple Vitamins-Minerals (MULTIVITAMIN ADULT PO)  SUMAtriptan (IMITREX) 100 MG tablet  TiZANidine HCl (ZANAFLEX PO)          Review of Systems   All other systems reviewed and are negative.      Physical Exam   BP: 139/91  Pulse: 102  Temp: 98  F (36.7  C)  Resp: 20  SpO2: 100 %      Physical Exam  Vitals and nursing note reviewed.   Constitutional:       Appearance: Normal appearance.   HENT:      Head: Normocephalic and atraumatic.      Right Ear: External ear normal.      Left Ear: External ear normal.      Nose: Nose normal.      Mouth/Throat:      Mouth: Mucous membranes are moist.      Pharynx: Oropharynx is clear.   Eyes:      Pupils: Pupils are equal, round, and reactive to light.   Cardiovascular:      Rate and Rhythm: Regular rhythm.      Pulses: Normal pulses.      Comments: Rate around 100  Pulmonary:      Effort: Pulmonary effort is normal.      " Breath sounds: Normal breath sounds.   Abdominal:      General: Abdomen is flat.      Palpations: Abdomen is soft.   Musculoskeletal:         General: No tenderness. Normal range of motion.      Comments: No CVA tenderness. No paraspinous muscle tenderness at present but says it is \"sore.\"   Skin:     General: Skin is warm.      Capillary Refill: Capillary refill takes less than 2 seconds.   Neurological:      General: No focal deficit present.      Mental Status: She is alert.   Psychiatric:         Mood and Affect: Mood normal.         ED Course        ED Course as of 03/22/23 2013   Wed Mar 22, 2023   0701 Sign out given pending UA and BMP to assess for UTI and renal function. If stable, okay for discharge and close PCP follow-up in 3 days for consideration of further renal imaging and other testing.   0705 Patient signed out to me at shift change. History of ankylosing spondylitis. Had back pain for several weeks and rust colored urine today. Labs and UA pending. Low suspicion for a kidney stone--occasional aching pain for weeks. Will be able to discharge if labs OK.   0814 Labs are reassuring: Kidney function is normal and hemoglobin is normal.  Urine shows blood, does not appear consistent with infection.  Spoke with patient and she says that it is possible that her menstrual period is starting.  It would be slightly early but she does have a history of irregular periods.  She has multiple reasons for low back pain including her ankylosing spondylitis and has a history of low back pain.  Her presentation is not consistent with a kidney stone.  Recommended that she follow-up closely with her primary doctor, drink plenty of water, and if she does not note that her menstrual period starts or has worsening symptoms of pain, difficulty urinating, or other concerning symptoms that she should return right away.  She expressed understanding.     Procedures                Results for orders placed or performed during " the hospital encounter of 03/22/23 (from the past 24 hour(s))   UA with Microscopic reflex to Culture    Specimen: Urine, Clean Catch   Result Value Ref Range    Color Urine Orange (A) Colorless, Straw, Light Yellow, Yellow    Appearance Urine Cloudy (A) Clear    Glucose Urine Negative Negative mg/dL    Bilirubin Urine Negative Negative    Ketones Urine Trace (A) Negative mg/dL    Specific Gravity Urine 1.028 1.003 - 1.035    Blood Urine Large (A) Negative    pH Urine 5.5 4.7 - 8.0    Protein Albumin Urine 70 (A) Negative mg/dL    Urobilinogen Urine Normal Normal, 2.0 mg/dL    Nitrite Urine Negative Negative    Leukocyte Esterase Urine Moderate (A) Negative    Mucus Urine Present (A) None Seen /LPF    RBC Urine >182 (H) <=2 /HPF    WBC Urine 5 <=5 /HPF    Squamous Epithelials Urine 0 <=1 /HPF    Narrative    Urine Culture ordered based on laboratory criteria   HCG qualitative urine   Result Value Ref Range    hCG Urine Qualitative Negative Negative   Basic metabolic panel   Result Value Ref Range    Sodium 141 136 - 145 mmol/L    Potassium 3.8 3.4 - 5.3 mmol/L    Chloride 104 98 - 107 mmol/L    Carbon Dioxide (CO2) 23 22 - 29 mmol/L    Anion Gap 14 7 - 15 mmol/L    Urea Nitrogen 18.9 6.0 - 20.0 mg/dL    Creatinine 0.63 0.51 - 0.95 mg/dL    Calcium 10.0 8.6 - 10.0 mg/dL    Glucose 100 (H) 70 - 99 mg/dL    GFR Estimate >90 >60 mL/min/1.73m2   CBC with platelets   Result Value Ref Range    WBC Count 4.2 4.0 - 11.0 10e3/uL    RBC Count 4.57 3.80 - 5.20 10e6/uL    Hemoglobin 13.4 11.7 - 15.7 g/dL    Hematocrit 41.5 35.0 - 47.0 %    MCV 91 78 - 100 fL    MCH 29.3 26.5 - 33.0 pg    MCHC 32.3 31.5 - 36.5 g/dL    RDW 13.0 10.0 - 15.0 %    Platelet Count 177 150 - 450 10e3/uL   Extra Tube    Narrative    The following orders were created for panel order Extra Tube.  Procedure                               Abnormality         Status                     ---------                               -----------         ------                      Extra Blue Top Tube[[id number]]                              Final result               Extra Red Top Tube[[id number]]                               Final result               Extra Heparinized Syringe[[id number]]                        Final result                 Please view results for these tests on the individual orders.   Extra Blue Top Tube   Result Value Ref Range    Hold Specimen JIC    Extra Red Top Tube   Result Value Ref Range    Hold Specimen JIC    Extra Heparinized Syringe   Result Value Ref Range    Hold Specimen JIC        Medications - No data to display    Assessments & Plan (with Medical Decision Making)     I have reviewed the nursing notes.    Well appearing with several weeks of intermittent left low back pain and concern for hematuria. Hx of ankylosing spondylitis as well. Plan to check renal function and UA/UPT. If renal function intact, which I suspect is likely as she is urinating at baseline, likely will be okay for discharge. Do not suspect kidney stone. Given the hx of several weeks, low suspicion for AAA rupture or pancreatitis. Considered CT of low back given surgical hx, but normal neurologic exam, non-tender midline spine, and do not suspect spinal fracture or constriction.    See ED Course    I have reviewed the findings, diagnosis, plan and need for follow up with the patient.  Medical Decision Making  The patient's presentation was of high complexity (an acute health issue posing potential threat to life or bodily function).    The patient's evaluation involved:  an assessment requiring an independent historian (see separate area of note for details)  ordering and/or review of 3+ test(s) in this encounter (see separate area of note for details)  strong consideration of a test (see separate area of note for details) that was ultimately deferred    The patient's management necessitated further care after sign-out to Dr. Mendoza (see their note for further  management).        Discharge Medication List as of 3/22/2023  8:39 AM          Final diagnoses:   Hematuria, unspecified type   Left flank pain       3/22/2023   [facility]     Agustin Linn MD  03/22/23 0702       Agustin Linn MD  03/22/23 2013

## 2023-03-22 NOTE — ED TRIAGE NOTES
Pt states over the last few weeks pt has had L flank and lower back pain and hematuria. Pt took some excedrin this AM which helped a little. Pt states she felt feverish but did not have a thermometer. Pt denies chest pain, nausea, SOB.      Triage Assessment     Row Name 03/22/23 0617       Triage Assessment (Adult)    Airway WDL WDL       Respiratory WDL    Respiratory WDL WDL       Skin Circulation/Temperature WDL    Skin Circulation/Temperature WDL WDL       Cardiac WDL    Cardiac WDL WDL       Peripheral/Neurovascular WDL    Peripheral Neurovascular WDL WDL       Cognitive/Neuro/Behavioral WDL    Cognitive/Neuro/Behavioral WDL WDL

## 2023-03-24 LAB — BACTERIA UR CULT: NO GROWTH

## 2023-03-25 ENCOUNTER — HOSPITAL ENCOUNTER (EMERGENCY)
Facility: HOSPITAL | Age: 48
End: 2023-03-25
Payer: MEDICAID

## 2023-03-25 ENCOUNTER — LAB (OUTPATIENT)
Dept: LAB | Facility: HOSPITAL | Age: 48
End: 2023-03-25
Payer: MEDICAID

## 2023-03-25 DIAGNOSIS — R31.9 HEMATURIA: Primary | ICD-10-CM

## 2023-03-25 LAB
CREAT UR-MCNC: 149.8 MG/DL
MICROALBUMIN UR-MCNC: <12 MG/L
MICROALBUMIN/CREAT UR: NORMAL MG/G{CREAT}

## 2023-03-25 PROCEDURE — 82570 ASSAY OF URINE CREATININE: CPT

## 2023-11-09 ENCOUNTER — HOSPITAL ENCOUNTER (EMERGENCY)
Facility: HOSPITAL | Age: 48
Discharge: HOME | End: 2023-11-09
Attending: NURSE PRACTITIONER | Admitting: NURSE PRACTITIONER
Payer: MEDICAID

## 2023-11-09 VITALS
TEMPERATURE: 99.3 F | WEIGHT: 110.01 LBS | SYSTOLIC BLOOD PRESSURE: 111 MMHG | HEIGHT: 65 IN | DIASTOLIC BLOOD PRESSURE: 68 MMHG | RESPIRATION RATE: 16 BRPM | HEART RATE: 94 BPM | BODY MASS INDEX: 18.33 KG/M2 | OXYGEN SATURATION: 97 %

## 2023-11-09 DIAGNOSIS — J01.40 ACUTE PANSINUSITIS: Primary | ICD-10-CM

## 2023-11-09 PROCEDURE — G0463 HOSPITAL OUTPT CLINIC VISIT: HCPCS

## 2023-11-09 PROCEDURE — 99213 OFFICE O/P EST LOW 20 MIN: CPT | Performed by: NURSE PRACTITIONER

## 2023-11-09 RX ORDER — CLORAZEPATE DIPOTASSIUM 3.75 MG/1
TABLET ORAL
COMMUNITY
Start: 2023-04-18

## 2023-11-09 RX ORDER — HYDROXYZINE PAMOATE 25 MG/1
CAPSULE ORAL
COMMUNITY
Start: 2023-07-07

## 2023-11-09 RX ORDER — PROPRANOLOL HCL 60 MG
60 CAPSULE, EXTENDED RELEASE 24HR ORAL
COMMUNITY
Start: 2022-11-30

## 2023-11-09 RX ORDER — BACLOFEN 10 MG/1
TABLET ORAL
COMMUNITY
Start: 2023-04-06

## 2023-11-09 RX ORDER — ASCORBIC ACID 500 MG
500 TABLET ORAL
COMMUNITY

## 2023-11-09 RX ORDER — DOXYCYCLINE HYCLATE 100 MG
100 TABLET ORAL 2 TIMES DAILY
Qty: 14 TABLET | Refills: 0 | Status: SHIPPED | OUTPATIENT
Start: 2023-11-09 | End: 2023-11-16

## 2023-11-09 ASSESSMENT — ENCOUNTER SYMPTOMS
FEVER: 0
TROUBLE SWALLOWING: 0
SHORTNESS OF BREATH: 0
HEADACHES: 1
SORE THROAT: 0
COUGH: 0
SINUS PRESSURE: 1
DIZZINESS: 0
CHILLS: 0

## 2023-11-09 NOTE — ED PROVIDER NOTES
History     Chief Complaint   Patient presents with    Sinusitis     SINUS ISSUE     HPI  Venu Amaya is a 48 year old female who presents to urgent care with concerns of a sinus infection.  Patient tells me that over the last week she has had frontal headaches wrapping around to the back of her head.  This is accompanied by frontal and maxillary sinus pressure along with intermittent upper tooth pain.  No fevers or chills.  Denies any recent trauma or injury to her head.  Reports that she is got stenosis of her cervical spine and is concerned that the pain is due to this.  She does have a history of migraines as well and takes Imitrex for this.        Allergies:  Allergies   Allergen Reactions    Amoxicillin Swelling     Hands swell     Levofloxacin Other (See Comments)     Unknown reaction - Levaquin     Other [No Clinical Screening - See Comments]      utapar drop in blood pressure    Pcn [Penicillins]     Ritodrine        Problem List:    Patient Active Problem List    Diagnosis Date Noted    S/P cervical spinal fusion 03/29/2018     Priority: Medium    Neural foraminal stenosis of cervical spine 10/05/2017     Priority: Medium    Opioid dependence (H) 12/23/2015     Priority: Medium    Long term (current) use of opiate analgesic 12/23/2015     Priority: Medium    Chronic low back pain 11/02/2015     Priority: Medium     Overview:   IMO Update      Headache 10/23/2014     Priority: Medium     Overview:   10/23/14: According to neurology consultation of 9/2/14, previous treatments  include pretty much all of the triptans, Maxalt, Imitrex, Amerge, Axert, Frova, and Zomig. She reports that she has tried these when she did not have a constant headache and they did not work when she took them at first onset nor do they work now. She recently tried Imitrex injections and has tried a nasal spray in the past without improvement. The patient does take Compazine for the nausea and reports that that helps somewhat.  She has tried numerous preventative medications as well including Topamax, Depakote, gabapentin, Tegretol, Dilantin, nortriptyline, propranolol, and verapamil per her report. She has tried physical therapy as well as trigger injections and denies that any of these things are helpful. She does report she takes ibuprofen 800 mg twice per day for ankylosing spondylitis. For her headaches, the only thing she has found that helped is Tylenol with Codeine.       Cervicalgia 05/15/2014     Priority: Medium    Myalgia and myositis 05/15/2014     Priority: Medium    Abnormal pap 07/30/2013     Priority: Medium    Ankylosing spondylitis (H) 05/24/2012     Priority: Medium     Flori AN       Dysthymic disorder 09/13/2010     Priority: Medium    Anxiety state 06/02/2009     Priority: Medium     Problem list name updated by automated process. Provider to review      Depressive disorder, not elsewhere classified 10/31/2007     Priority: Medium    Migraine 01/15/2003     Priority: Medium     Problem list name updated by automated process. 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    Inguinal hernia without mention of obstruction or gangrene, unilateral or unspecified, (not specified as recurrent) 06/16/2008    Irregular menstrual cycle 05/25/2011    Mental disorders of mother, postpartum 10/06/2005    Migraine, unspecified, without mention of intractable migraine without mention of status migrainosus 01/15/2003    Moderate dysplasia of cervix 11/03/2011    Pain in thoracic spine 11/03/2011     "Scoliosis (and kyphoscoliosis), idiopathic 2011       Past Surgical History:    Past Surgical History:   Procedure Laterality Date    HEAD & NECK SURGERY      inguinal hernia repair, Germain repair  10/14/2008    RT     TUBAL LIGATION         Family History:    Family History   Problem Relation Age of Onset    Cancer Mother         lymphoma    Cancer Other     Cancer Other     Colon Cancer Sister        Social History:  Marital Status:   [2]  Social History     Tobacco Use    Smoking status: Some Days     Packs/day: 0     Types: Cigarettes     Last attempt to quit: 2015     Years since quittin.3    Smokeless tobacco: Never    Tobacco comments:     Passive Exposure: Yes    Substance Use Topics    Alcohol use: No    Drug use: No        Medications:    acetaminophen-codeine (TYLENOL/CODEINE #3) 300-30 MG per tablet  baclofen (LIORESAL) 10 MG tablet  chlorhexidine (PERIDEX) 0.12 % solution  clorazepate (TRANXENE) 3.75 MG tablet  doxycycline hyclate (VIBRA-TABS) 100 MG tablet  gabapentin (NEURONTIN) 300 MG capsule  hydrOXYzine (ATARAX) 25 MG tablet  hydrOXYzine (VISTARIL) 25 MG capsule  IBUPROFEN PO  Multiple Vitamins-Minerals (MULTIVITAMIN ADULT PO)  propranolol ER (INDERAL LA) 60 MG 24 hr capsule  TiZANidine HCl (ZANAFLEX PO)  vitamin C (ASCORBIC ACID) 500 MG tablet  SUMAtriptan (IMITREX) 100 MG tablet          Review of Systems   Constitutional:  Negative for chills and fever.   HENT:  Positive for ear pain and sinus pressure. Negative for ear discharge, sore throat and trouble swallowing.    Respiratory:  Negative for cough and shortness of breath.    Cardiovascular:  Negative for chest pain.   Neurological:  Positive for headaches. Negative for dizziness.   All other systems reviewed and are negative.      Physical Exam   BP: 111/68  Pulse: 94  Temp: 99.3  F (37.4  C)  Resp: 16  Height: 165.1 cm (5' 5\")  Weight: 49.9 kg (110 lb 0.2 oz)  SpO2: 97 %      Physical Exam  Vitals and nursing " note reviewed.   Constitutional:       General: She is not in acute distress.     Appearance: Normal appearance. She is well-developed. She is not ill-appearing or toxic-appearing.   HENT:      Head: Normocephalic and atraumatic.      Right Ear: Ear canal normal. A middle ear effusion is present. Tympanic membrane is not injected or erythematous.      Left Ear: Tympanic membrane and ear canal normal. Tympanic membrane is not injected or erythematous.      Mouth/Throat:      Mouth: Mucous membranes are moist.   Eyes:      Conjunctiva/sclera: Conjunctivae normal.   Cardiovascular:      Rate and Rhythm: Normal rate and regular rhythm.      Heart sounds: Normal heart sounds.   Pulmonary:      Effort: Pulmonary effort is normal. No respiratory distress.      Breath sounds: Normal breath sounds. No rhonchi or rales.   Abdominal:      General: Abdomen is flat.   Musculoskeletal:      Cervical back: Normal range of motion and neck supple.   Skin:     General: Skin is warm and dry.      Coloration: Skin is not pale.   Neurological:      Mental Status: She is alert and oriented to person, place, and time.         ED Course                 Procedures             No results found for this or any previous visit (from the past 24 hour(s)).    Medications - No data to display    Assessments & Plan (with Medical Decision Making)   This is a 48-year-old female that presented for evaluation of sinus pain and pressure accompanied by headaches.  Symptoms started about a week ago.  HPI and physical exam findings are consistent with sinusitis.  This will be treated with doxycycline as prescribed.  Recommended taking an oral decongestant and pushing fluids.  Follow-up with primary doctor if no improvement in symptoms.  Return to urgent care or emergency department for any worsening or concerning symptoms.    I have reviewed the nursing notes.    I have reviewed the findings, diagnosis, plan and need for follow up with the patient.  This  document was prepared using a combination of typing and voice generated software.  While every attempt was made for accuracy, spelling and grammatical errors may exist.         New Prescriptions    DOXYCYCLINE HYCLATE (VIBRA-TABS) 100 MG TABLET    Take 1 tablet (100 mg) by mouth 2 times daily for 7 days       Final diagnoses:   Acute pansinusitis       11/9/2023   [facility]       Mpofu, Carlaudence, CNP  11/09/23 1923

## 2023-11-09 NOTE — ED TRIAGE NOTES
Pt presents with c/o sinus pressure and sinus headaches. Sx started a little after halloween. Pt has hx of stenosis. Pt has been taking tylenol and ibuprofen. Ibuprofen this morning. Pt refuses covid testing at this time.

## 2023-11-09 NOTE — DISCHARGE INSTRUCTIONS
Take the antibiotic as prescribed until finished.  Take an over-the-counter oral decongestant.  Drink plenty of fluids.    Tylenol or ibuprofen as needed for pain.    Follow-up with your doctor if no improvement in symptoms.    Return to urgent care or emergency department for any worsening or concerning symptoms.

## 2024-04-17 ENCOUNTER — HOSPITAL ENCOUNTER (EMERGENCY)
Facility: HOSPITAL | Age: 49
Discharge: HOME | End: 2024-04-17
Payer: MEDICAID

## 2024-04-17 VITALS
RESPIRATION RATE: 18 BRPM | WEIGHT: 108.5 LBS | SYSTOLIC BLOOD PRESSURE: 112 MMHG | HEIGHT: 65 IN | HEART RATE: 102 BPM | BODY MASS INDEX: 18.08 KG/M2 | DIASTOLIC BLOOD PRESSURE: 79 MMHG | OXYGEN SATURATION: 99 % | TEMPERATURE: 99.7 F

## 2024-04-17 DIAGNOSIS — K08.89 PAIN, DENTAL: ICD-10-CM

## 2024-04-17 PROCEDURE — 99213 OFFICE O/P EST LOW 20 MIN: CPT

## 2024-04-17 PROCEDURE — G0463 HOSPITAL OUTPT CLINIC VISIT: HCPCS

## 2024-04-17 RX ORDER — CLONAZEPAM 0.5 MG/1
0.5 TABLET ORAL 2 TIMES DAILY PRN
COMMUNITY
Start: 2024-03-15

## 2024-04-17 RX ORDER — CLINDAMYCIN HCL 300 MG
300 CAPSULE ORAL 3 TIMES DAILY
Qty: 21 CAPSULE | Refills: 0 | Status: SHIPPED | OUTPATIENT
Start: 2024-04-17 | End: 2024-04-24

## 2024-04-17 RX ORDER — CHLORHEXIDINE GLUCONATE ORAL RINSE 1.2 MG/ML
15 SOLUTION DENTAL 2 TIMES DAILY
Qty: 473 ML | Refills: 0 | Status: SHIPPED | OUTPATIENT
Start: 2024-04-17 | End: 2024-04-24

## 2024-04-17 RX ORDER — SACCHAROMYCES BOULARDII 250 MG
250 CAPSULE ORAL 2 TIMES DAILY
Qty: 28 CAPSULE | Refills: 0 | Status: SHIPPED | OUTPATIENT
Start: 2024-04-17 | End: 2024-05-01

## 2024-04-17 ASSESSMENT — ENCOUNTER SYMPTOMS
FEVER: 0
DIARRHEA: 0
VOMITING: 0
ACTIVITY CHANGE: 0
CHILLS: 0
SORE THROAT: 0
APPETITE CHANGE: 0
FACIAL SWELLING: 0
TROUBLE SWALLOWING: 0
NAUSEA: 0

## 2024-04-17 ASSESSMENT — ACTIVITIES OF DAILY LIVING (ADL): ADLS_ACUITY_SCORE: 35

## 2024-04-17 NOTE — ED PROVIDER NOTES
History     Chief Complaint   Patient presents with    Dental Pain     HPI  Venu Amaya is a 48 year old female who presents to the urgent care with increased right lower dental pain. She denies fevers, chills, swallowing difficulty, and difficulty opening mouth. Has an appt with dentist at the end of May, on the cancellation list. No recent abx. Has been taking ibuprofen with some reduction in pain.     Allergies:  Allergies   Allergen Reactions    Amoxicillin Swelling     Hands swell     Levofloxacin Other (See Comments)     Unknown reaction - Levaquin     Other [No Clinical Screening - See Comments]      utapar drop in blood pressure    Pcn [Penicillins]     Ritodrine        Problem List:    Patient Active Problem List    Diagnosis Date Noted    S/P cervical spinal fusion 03/29/2018     Priority: Medium    Neural foraminal stenosis of cervical spine 10/05/2017     Priority: Medium    Opioid dependence (H) 12/23/2015     Priority: Medium    Long term (current) use of opiate analgesic 12/23/2015     Priority: Medium    Chronic low back pain 11/02/2015     Priority: Medium     Overview:   IMO Update      Headache 10/23/2014     Priority: Medium     Overview:   10/23/14: According to neurology consultation of 9/2/14, previous treatments  include pretty much all of the triptans, Maxalt, Imitrex, Amerge, Axert, Frova, and Zomig. She reports that she has tried these when she did not have a constant headache and they did not work when she took them at first onset nor do they work now. She recently tried Imitrex injections and has tried a nasal spray in the past without improvement. The patient does take Compazine for the nausea and reports that that helps somewhat. She has tried numerous preventative medications as well including Topamax, Depakote, gabapentin, Tegretol, Dilantin, nortriptyline, propranolol, and verapamil per her report. She has tried physical therapy as well as trigger injections and denies that  any of these things are helpful. She does report she takes ibuprofen 800 mg twice per day for ankylosing spondylitis. For her headaches, the only thing she has found that helped is Tylenol with Codeine.       Cervicalgia 05/15/2014     Priority: Medium    Myalgia and myositis 05/15/2014     Priority: Medium    Abnormal pap 07/30/2013     Priority: Medium    Ankylosing spondylitis (H) 05/24/2012     Priority: Medium     Flori AN       Dysthymic disorder 09/13/2010     Priority: Medium    Anxiety state 06/02/2009     Priority: Medium     Problem list name updated by automated process. Provider to review      Depressive disorder, not elsewhere classified 10/31/2007     Priority: Medium    Migraine 01/15/2003     Priority: Medium     Problem list name updated by automated process. 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    Inguinal hernia without mention of obstruction or gangrene, unilateral or unspecified, (not specified as recurrent) 06/16/2008    Irregular menstrual cycle 05/25/2011    Mental disorders of mother, postpartum 10/06/2005    Migraine, unspecified, without mention of intractable migraine without mention of status migrainosus 01/15/2003    Moderate dysplasia of cervix 11/03/2011    Pain in thoracic spine 11/03/2011    Scoliosis (and kyphoscoliosis), idiopathic 05/16/2011       Past Surgical History:    Past Surgical History:   Procedure Laterality Date    HEAD & NECK SURGERY      inguinal hernia repair, Germain repair  10/14/2008    RT     TUBAL LIGATION    "      Family History:    Family History   Problem Relation Age of Onset    Cancer Mother         lymphoma    Cancer Other     Cancer Other     Colon Cancer Sister        Social History:  Marital Status:   [2]  Social History     Tobacco Use    Smoking status: Some Days     Current packs/day: 0.00     Types: Cigarettes     Last attempt to quit: 2015     Years since quittin.7    Smokeless tobacco: Never    Tobacco comments:     Passive Exposure: Yes    Substance Use Topics    Alcohol use: No    Drug use: No        Medications:    acetaminophen-codeine (TYLENOL/CODEINE #3) 300-30 MG per tablet  chlorhexidine (PERIDEX) 0.12 % solution  clindamycin (CLEOCIN) 300 MG capsule  clonazePAM (KLONOPIN) 0.5 MG tablet  clorazepate (TRANXENE) 3.75 MG tablet  hydrOXYzine (ATARAX) 25 MG tablet  hydrOXYzine (VISTARIL) 25 MG capsule  IBUPROFEN PO  Multiple Vitamins-Minerals (MULTIVITAMIN ADULT PO)  saccharomyces boulardii (FLORASTOR) 250 MG capsule  SUMAtriptan (IMITREX) 100 MG tablet  TiZANidine HCl (ZANAFLEX PO)  baclofen (LIORESAL) 10 MG tablet  gabapentin (NEURONTIN) 300 MG capsule  propranolol ER (INDERAL LA) 60 MG 24 hr capsule  vitamin C (ASCORBIC ACID) 500 MG tablet          Review of Systems   Constitutional:  Negative for activity change, appetite change, chills and fever.   HENT:  Positive for dental problem. Negative for facial swelling, sore throat and trouble swallowing.    Gastrointestinal:  Negative for diarrhea, nausea and vomiting.   All other systems reviewed and are negative.      Physical Exam   BP: 112/79  Pulse: 102  Temp: 99.7  F (37.6  C)  Resp: 18  Height: 165.1 cm (5' 5\")  Weight: 49.2 kg (108 lb 8 oz)  SpO2: 99 %      Physical Exam  Vitals and nursing note reviewed.   Constitutional:       General: She is not in acute distress.     Appearance: Normal appearance. She is not ill-appearing or toxic-appearing.   HENT:      Head:      Jaw: No trismus, tenderness or pain on movement.      Right " Ear: Tympanic membrane is not erythematous.      Left Ear: Tympanic membrane is not erythematous.      Nose: No congestion or rhinorrhea.      Mouth/Throat:      Mouth: Mucous membranes are moist.      Dentition: Abnormal dentition. Dental tenderness, gingival swelling, dental caries and gum lesions present.      Pharynx: Oropharynx is clear. No oropharyngeal exudate or posterior oropharyngeal erythema.        Comments: Pain to tooth #28. No visible abscess or palpable fluctuance. No trismus, drooling, or swallowing difficulty.  Cardiovascular:      Rate and Rhythm: Normal rate and regular rhythm.      Pulses: Normal pulses.      Heart sounds: Normal heart sounds.   Pulmonary:      Effort: Pulmonary effort is normal.      Breath sounds: Normal breath sounds. No wheezing.   Neurological:      Mental Status: She is alert.         ED Course        Procedures              No results found for this or any previous visit (from the past 24 hour(s)).    Medications - No data to display    Assessments & Plan (with Medical Decision Making)     I have reviewed the nursing notes.    I have reviewed the findings, diagnosis, plan and need for follow up with the patient.  Venu Amaya is a 48 year old female who presents to the urgent care with increased right lower dental pain. She denies fevers, chills, swallowing difficulty, and difficulty opening mouth. Has an appt with dentist at the end of May, on the cancellation list. No recent abx. Has been taking ibuprofen with some reduction in pain.     MDM: vital signs normal, temp 99.7. lungs clear, heart tones regular. Multiple areas of poor dentition throughout mouth. Pain to tooth #28. No visible abscess or palpable fluctuance. No trismus, drooling, or swallowing difficulty. She is non toxic in appearance with no noted distress. Declines Toradol injection. Clindamycin, peridex, and probiotics prescribed as she has a pcn allergy. Supportive measures and return precautions  discussed. She is in agreement with plan.     (K08.89) Pain, dental  Plan: Follow up with dentist.   Antibiotics 3 times daily for 7 days   Probiotic 2 times daily for 14 days.   Peridex swish and spit 2 times daily.   Return with any fevers, swallowing difficulty, difficulty opening mouth, or other concerns. Understanding verbalized.       New Prescriptions    CHLORHEXIDINE (PERIDEX) 0.12 % SOLUTION    Swish and spit 15 mLs in mouth 2 times daily for 7 days    CLINDAMYCIN (CLEOCIN) 300 MG CAPSULE    Take 1 capsule (300 mg) by mouth 3 times daily for 7 days    SACCHAROMYCES BOULARDII (FLORASTOR) 250 MG CAPSULE    Take 1 capsule (250 mg) by mouth 2 times daily for 14 days       Final diagnoses:   Pain, dental       4/17/2024   [facility]       Stefany Sanchez NP  04/17/24 [id number]

## 2024-04-17 NOTE — DISCHARGE INSTRUCTIONS
Follow up with dentist.   Antibiotics 3 times daily for 7 days   Probiotic 2 times daily for 14 days.   Peridex swish and spit 2 times daily.   Return with any fevers, swallowing difficulty, difficulty opening mouth, or other concerns.

## 2024-04-17 NOTE — ED TRIAGE NOTES
Pt presents with concerns of tooth infection, pt reports being unable to see the dentist until the end of may.

## 2024-05-30 ENCOUNTER — LAB REQUISITION (OUTPATIENT)
Dept: LAB | Facility: HOSPITAL | Age: 49
End: 2024-05-30
Payer: MEDICAID

## 2024-05-30 DIAGNOSIS — R51.9 HEADACHE, UNSPECIFIED: ICD-10-CM

## 2024-05-30 LAB
BASE EXCESS BLDV CALC-SCNC: 0.7 MMOL/L (ref -3–3)
HCO3 BLDV-SCNC: 27 MMOL/L (ref 21–28)
O2/TOTAL GAS SETTING VFR VENT: 21 %
OXYHGB MFR BLDV: 87 % (ref 70–75)
PCO2 BLDV: 47 MM HG (ref 40–50)
PH BLDV: 7.37 [PH] (ref 7.32–7.43)
PO2 BLDV: 55 MM HG (ref 25–47)
SAO2 % BLDV: 88.5 % (ref 70–75)

## 2024-05-30 PROCEDURE — 82805 BLOOD GASES W/O2 SATURATION: CPT | Performed by: INTERNAL MEDICINE

## 2024-07-07 ENCOUNTER — HEALTH MAINTENANCE LETTER (OUTPATIENT)
Age: 49
End: 2024-07-07

## 2024-08-29 ENCOUNTER — HOSPITAL ENCOUNTER (EMERGENCY)
Facility: HOSPITAL | Age: 49
Discharge: HOME | End: 2024-08-29
Attending: EMERGENCY MEDICINE | Admitting: EMERGENCY MEDICINE
Payer: MEDICAID

## 2024-08-29 VITALS
HEART RATE: 86 BPM | DIASTOLIC BLOOD PRESSURE: 82 MMHG | TEMPERATURE: 98.9 F | WEIGHT: 105 LBS | BODY MASS INDEX: 17.47 KG/M2 | RESPIRATION RATE: 16 BRPM | SYSTOLIC BLOOD PRESSURE: 125 MMHG | OXYGEN SATURATION: 100 %

## 2024-08-29 DIAGNOSIS — R35.0 INCREASED FREQUENCY OF URINATION: ICD-10-CM

## 2024-08-29 LAB
ALBUMIN UR-MCNC: 20 MG/DL
APPEARANCE UR: CLEAR
BILIRUB UR QL STRIP: NEGATIVE
COLOR UR AUTO: ABNORMAL
GLUCOSE UR STRIP-MCNC: NEGATIVE MG/DL
HCG UR QL: NEGATIVE
HGB UR QL STRIP: NEGATIVE
KETONES UR STRIP-MCNC: NEGATIVE MG/DL
LEUKOCYTE ESTERASE UR QL STRIP: NEGATIVE
MUCOUS THREADS #/AREA URNS LPF: PRESENT /LPF
NITRATE UR QL: NEGATIVE
PH UR STRIP: 6.5 [PH] (ref 4.7–8)
RBC URINE: 0 /HPF
SP GR UR STRIP: 1.02 (ref 1–1.03)
SQUAMOUS EPITHELIAL: 4 /HPF
UROBILINOGEN UR STRIP-MCNC: NORMAL MG/DL
WBC URINE: 1 /HPF
YEAST #/AREA URNS HPF: ABNORMAL /HPF

## 2024-08-29 PROCEDURE — 99283 EMERGENCY DEPT VISIT LOW MDM: CPT | Performed by: EMERGENCY MEDICINE

## 2024-08-29 PROCEDURE — 81025 URINE PREGNANCY TEST: CPT | Performed by: EMERGENCY MEDICINE

## 2024-08-29 PROCEDURE — 87086 URINE CULTURE/COLONY COUNT: CPT | Performed by: EMERGENCY MEDICINE

## 2024-08-29 PROCEDURE — 81003 URINALYSIS AUTO W/O SCOPE: CPT | Performed by: EMERGENCY MEDICINE

## 2024-08-29 PROCEDURE — 99283 EMERGENCY DEPT VISIT LOW MDM: CPT

## 2024-08-29 ASSESSMENT — COLUMBIA-SUICIDE SEVERITY RATING SCALE - C-SSRS
6. HAVE YOU EVER DONE ANYTHING, STARTED TO DO ANYTHING, OR PREPARED TO DO ANYTHING TO END YOUR LIFE?: NO
2. HAVE YOU ACTUALLY HAD ANY THOUGHTS OF KILLING YOURSELF IN THE PAST MONTH?: NO
1. IN THE PAST MONTH, HAVE YOU WISHED YOU WERE DEAD OR WISHED YOU COULD GO TO SLEEP AND NOT WAKE UP?: NO

## 2024-08-29 ASSESSMENT — ACTIVITIES OF DAILY LIVING (ADL): ADLS_ACUITY_SCORE: 35

## 2024-08-30 NOTE — DISCHARGE INSTRUCTIONS
The urinalysis does not show signs of UTI.   Please follow-up with your PCP within 3-5 days.   Return to the ER if you have new, worsening or any concerning symptoms that arise.

## 2024-08-30 NOTE — ED NOTES
States that she wants to see if she has a UTI. Does states that her bladder feels uncomfortable and that she has urinary frequency. Denies pain or burning with urination. Denies history of kidney stones, but does state she has had a UTI before.

## 2024-08-30 NOTE — ED PROVIDER NOTES
History     Chief Complaint   Patient presents with    Urinary Frequency     HPI  Venu Amaya is a 49 year old female no PMH who presents with increased frequency of urination. Patient states has been having increased frequency of urination and hesitancy. Patient denies fevers, chills, nausea/vomiting, abdominal pain, changes in BM, dysuria, hematuria, flank pain.     Allergies:  Allergies   Allergen Reactions    Amoxicillin Swelling     Hands swell     Levofloxacin Other (See Comments)     Unknown reaction - Levaquin     Other [No Clinical Screening - See Comments]      utapar drop in blood pressure    Pcn [Penicillins]     Ritodrine        Problem List:    Patient Active Problem List    Diagnosis Date Noted    S/P cervical spinal fusion 03/29/2018     Priority: Medium    Neural foraminal stenosis of cervical spine 10/05/2017     Priority: Medium    Opioid dependence (H) 12/23/2015     Priority: Medium    Long term (current) use of opiate analgesic 12/23/2015     Priority: Medium    Chronic low back pain 11/02/2015     Priority: Medium     Overview:   IMO Update      Headache 10/23/2014     Priority: Medium     Overview:   10/23/14: According to neurology consultation of 9/2/14, previous treatments  include pretty much all of the triptans, Maxalt, Imitrex, Amerge, Axert, Frova, and Zomig. She reports that she has tried these when she did not have a constant headache and they did not work when she took them at first onset nor do they work now. She recently tried Imitrex injections and has tried a nasal spray in the past without improvement. The patient does take Compazine for the nausea and reports that that helps somewhat. She has tried numerous preventative medications as well including Topamax, Depakote, gabapentin, Tegretol, Dilantin, nortriptyline, propranolol, and verapamil per her report. She has tried physical therapy as well as trigger injections and denies that any of these things are helpful. She  does report she takes ibuprofen 800 mg twice per day for ankylosing spondylitis. For her headaches, the only thing she has found that helped is Tylenol with Codeine.       Cervicalgia 05/15/2014     Priority: Medium    Myalgia and myositis 05/15/2014     Priority: Medium    Abnormal pap 07/30/2013     Priority: Medium    Ankylosing spondylitis (H) 05/24/2012     Priority: Medium     Flori AN       Dysthymic disorder 09/13/2010     Priority: Medium    Anxiety state 06/02/2009     Priority: Medium     Problem list name updated by automated process. Provider to review      Depressive disorder, not elsewhere classified 10/31/2007     Priority: Medium    Migraine 01/15/2003     Priority: Medium     Problem list name updated by automated process. 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    Inguinal hernia without mention of obstruction or gangrene, unilateral or unspecified, (not specified as recurrent) 06/16/2008    Irregular menstrual cycle 05/25/2011    Mental disorders of mother, postpartum 10/06/2005    Migraine, unspecified, without mention of intractable migraine without mention of status migrainosus 01/15/2003    Moderate dysplasia of cervix 11/03/2011    Pain in thoracic spine 11/03/2011    Scoliosis (and kyphoscoliosis), idiopathic 05/16/2011       Past Surgical History:    Past Surgical History:   Procedure Laterality Date    HEAD & NECK SURGERY      inguinal hernia repair, Germain repair  10/14/2008    RT     TUBAL LIGATION         Family History:    Family History    Problem Relation Age of Onset    Cancer Mother         lymphoma    Cancer Other     Cancer Other     Colon Cancer Sister        Social History:  Marital Status:   [2]  Social History     Tobacco Use    Smoking status: Some Days     Current packs/day: 0.00     Types: Cigarettes     Last attempt to quit: 2015     Years since quittin.1    Smokeless tobacco: Never    Tobacco comments:     Passive Exposure: Yes    Substance Use Topics    Alcohol use: No    Drug use: No        Medications:    baclofen (LIORESAL) 10 MG tablet  clonazePAM (KLONOPIN) 0.5 MG tablet  clorazepate (TRANXENE) 3.75 MG tablet  gabapentin (NEURONTIN) 300 MG capsule  hydrOXYzine (ATARAX) 25 MG tablet  hydrOXYzine (VISTARIL) 25 MG capsule  IBUPROFEN PO  propranolol ER (INDERAL LA) 60 MG 24 hr capsule  TiZANidine HCl (ZANAFLEX PO)  vitamin C (ASCORBIC ACID) 500 MG tablet  acetaminophen-codeine (TYLENOL/CODEINE #3) 300-30 MG per tablet  Multiple Vitamins-Minerals (MULTIVITAMIN ADULT PO)  SUMAtriptan (IMITREX) 100 MG tablet          Review of Systems    All systems reviewed and negative with exception of that stated in the HPI      Physical Exam   BP: 125/82  Pulse: 86  Temp: 98.9  F (37.2  C)  Resp: 16  Weight: 47.6 kg (105 lb)  SpO2: 100 %      Physical Exam    INITIAL VITAL SIGNS: Reviewed by me  GENERAL: Alert and interactive. No acute distress  HEAD: Head is normocephalic and atraumatic  EYES: No conjunctival injection  ENT: Moist mucous membranes.  RESPIRATORY: No tachypnea. Clear breath sounds bilaterally. No wheezing, rales, or rhonchi  CV: Regular rate and rhythm. No murmurs, rubs, or gallops  ABDOMEN: Soft, non-distended, non-tender. No guarding. No rebound. No masses.  No CVAT.   SKIN: Warm and dry. No obvious rashes.  NEUROLOGIC: Alert and oriented. Face is symmetric. Speech is normal.     ED Course            Results for orders placed or performed during the hospital encounter of 24 (from the past 24 hour(s))   UA  with Microscopic reflex to Culture    Specimen: Urine, NOS   Result Value Ref Range    Color Urine Light Yellow Colorless, Straw, Light Yellow, Yellow    Appearance Urine Clear Clear    Glucose Urine Negative Negative mg/dL    Bilirubin Urine Negative Negative    Ketones Urine Negative Negative mg/dL    Specific Gravity Urine 1.025 1.003 - 1.035    Blood Urine Negative Negative    pH Urine 6.5 4.7 - 8.0    Protein Albumin Urine 20 (A) Negative mg/dL    Urobilinogen Urine Normal Normal, 2.0 mg/dL    Nitrite Urine Negative Negative    Leukocyte Esterase Urine Negative Negative    Budding Yeast Urine Few (A) None Seen /HPF    Mucus Urine Present (A) None Seen /LPF    RBC Urine 0 <=2 /HPF    WBC Urine 1 <=5 /HPF    Squamous Epithelials Urine 4 (H) <=1 /HPF    Narrative    Urine Culture not indicated   HCG qualitative urine (UPT)   Result Value Ref Range    hCG Urine Qualitative Negative Negative       Medications - No data to display    Assessments & Plan (with Medical Decision Making)     I have reviewed the nursing notes.    I have reviewed the findings, diagnosis, plan and need for follow up with the patient.      Medical Decision Making  The patient's presentation was of straightforward complexity (a clearly self-limited or minor problem).    The patient's evaluation involved:  ordering and/or review of 2 test(s) in this encounter (see separate area of note for details)    The patient's management necessitated only low risk treatment.    Venu Amaya is a 49 year old female no PMH who presents with increased frequency of urination. VS WNL. Exam is normal.  UA negative for UTI. Urine culture sent.  Return precautions given  Follow-up with PCP      Discharge Medication List as of 8/29/2024 10:48 PM          Final diagnoses:   Increased frequency of urination       8/29/2024   [facility]       Shana Torres MD  08/30/24 0020

## 2024-08-30 NOTE — ED NOTES
When collecting urine sample from patient, she does state that she is just ending her menstrual cycle. She denies any new flank pain. She also states that she is going camping tomorrow and so she wants to see if she has a UTI.

## 2024-08-30 NOTE — ED TRIAGE NOTES
States that today developed urinary frequency and feels like her bladder is not emptying.  Denies pain or burning with urination.      Triage Assessment (Adult)       Row Name 08/29/24 2149          Triage Assessment    Airway WDL WDL

## 2024-09-01 LAB — BACTERIA UR CULT: NORMAL

## 2024-09-13 ENCOUNTER — HOSPITAL ENCOUNTER (EMERGENCY)
Facility: HOSPITAL | Age: 49
Discharge: HOME | End: 2024-09-13
Attending: PHYSICIAN ASSISTANT | Admitting: PHYSICIAN ASSISTANT
Payer: MEDICAID

## 2024-09-13 VITALS
DIASTOLIC BLOOD PRESSURE: 83 MMHG | HEART RATE: 80 BPM | OXYGEN SATURATION: 98 % | RESPIRATION RATE: 18 BRPM | TEMPERATURE: 98.7 F | SYSTOLIC BLOOD PRESSURE: 131 MMHG

## 2024-09-13 DIAGNOSIS — K08.89 PAIN, DENTAL: ICD-10-CM

## 2024-09-13 PROCEDURE — G0463 HOSPITAL OUTPT CLINIC VISIT: HCPCS

## 2024-09-13 PROCEDURE — 99213 OFFICE O/P EST LOW 20 MIN: CPT | Performed by: PHYSICIAN ASSISTANT

## 2024-09-13 ASSESSMENT — ENCOUNTER SYMPTOMS
EYES NEGATIVE: 1
PSYCHIATRIC NEGATIVE: 1
GASTROINTESTINAL NEGATIVE: 1
RESPIRATORY NEGATIVE: 1
CARDIOVASCULAR NEGATIVE: 1
MUSCULOSKELETAL NEGATIVE: 1
ENDOCRINE NEGATIVE: 1
NEUROLOGICAL NEGATIVE: 1
CONSTITUTIONAL NEGATIVE: 1

## 2024-09-13 ASSESSMENT — ACTIVITIES OF DAILY LIVING (ADL): ADLS_ACUITY_SCORE: 35

## 2024-09-13 NOTE — ED TRIAGE NOTES
Pt presents with c/o having increased left dental pain and swelling   Pt states just wanted to make sure it was not infected   Pt had ibu last at 0700

## 2024-09-13 NOTE — ED PROVIDER NOTES
History     Chief Complaint   Patient presents with    Dental Pain     HPI  Venu Amaya is a 49 year old female who presents to urgent care with complaints of concerns about 2 teeth that she had pulled on Monday.  Patient is leaving town and she is concerned because she has of increased pain and swelling where she had 2 teeth removed #17 and #19.  She has been taking Tylenol ibuprofen she has had no fever there is no gum swelling she is just concerned possibly that there is a problem since she feels a little more pain today.  She did not contact her dentist.  She has had no drainage or fever.  The area is still relatively sensitive.    Allergies:  Allergies   Allergen Reactions    Amoxicillin Swelling     Hands swell     Levofloxacin Other (See Comments)     Unknown reaction - Levaquin     Other [No Clinical Screening - See Comments]      utapar drop in blood pressure    Pcn [Penicillins]     Ritodrine        Problem List:    Patient Active Problem List    Diagnosis Date Noted    S/P cervical spinal fusion 03/29/2018     Priority: Medium    Neural foraminal stenosis of cervical spine 10/05/2017     Priority: Medium    Opioid dependence (H) 12/23/2015     Priority: Medium    Long term (current) use of opiate analgesic 12/23/2015     Priority: Medium    Chronic low back pain 11/02/2015     Priority: Medium     Overview:   IMO Update      Headache 10/23/2014     Priority: Medium     Overview:   10/23/14: According to neurology consultation of 9/2/14, previous treatments  include pretty much all of the triptans, Maxalt, Imitrex, Amerge, Axert, Frova, and Zomig. She reports that she has tried these when she did not have a constant headache and they did not work when she took them at first onset nor do they work now. She recently tried Imitrex injections and has tried a nasal spray in the past without improvement. The patient does take Compazine for the nausea and reports that that helps somewhat. She has tried  numerous preventative medications as well including Topamax, Depakote, gabapentin, Tegretol, Dilantin, nortriptyline, propranolol, and verapamil per her report. She has tried physical therapy as well as trigger injections and denies that any of these things are helpful. She does report she takes ibuprofen 800 mg twice per day for ankylosing spondylitis. For her headaches, the only thing she has found that helped is Tylenol with Codeine.       Cervicalgia 05/15/2014     Priority: Medium    Myalgia and myositis 05/15/2014     Priority: Medium    Abnormal pap 07/30/2013     Priority: Medium    Ankylosing spondylitis (H) 05/24/2012     Priority: Medium     Flori AN       Dysthymic disorder 09/13/2010     Priority: Medium    Anxiety state 06/02/2009     Priority: Medium     Problem list name updated by automated process. Provider to review      Depressive disorder, not elsewhere classified 10/31/2007     Priority: Medium    Migraine 01/15/2003     Priority: Medium     Problem list name updated by automated process. 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    Inguinal hernia without mention of obstruction or gangrene, unilateral or unspecified, (not specified as recurrent) 06/16/2008    Irregular menstrual cycle 05/25/2011    Mental disorders of mother, postpartum 10/06/2005    Migraine, unspecified, without mention of intractable migraine without mention of status migrainosus 01/15/2003    Moderate dysplasia of cervix 11/03/2011    Pain in thoracic spine 11/03/2011    Scoliosis (and  kyphoscoliosis), idiopathic 2011       Past Surgical History:    Past Surgical History:   Procedure Laterality Date    HEAD & NECK SURGERY      inguinal hernia repair, Germain repair  10/14/2008    RT     TUBAL LIGATION         Family History:    Family History   Problem Relation Age of Onset    Cancer Mother         lymphoma    Cancer Other     Cancer Other     Colon Cancer Sister        Social History:  Marital Status:   [2]  Social History     Tobacco Use    Smoking status: Some Days     Current packs/day: 0.00     Types: Cigarettes     Last attempt to quit: 2015     Years since quittin.1    Smokeless tobacco: Never    Tobacco comments:     Passive Exposure: Yes    Substance Use Topics    Alcohol use: No    Drug use: No        Medications:    acetaminophen-codeine (TYLENOL/CODEINE #3) 300-30 MG per tablet  baclofen (LIORESAL) 10 MG tablet  clonazePAM (KLONOPIN) 0.5 MG tablet  clorazepate (TRANXENE) 3.75 MG tablet  gabapentin (NEURONTIN) 300 MG capsule  hydrOXYzine (ATARAX) 25 MG tablet  hydrOXYzine (VISTARIL) 25 MG capsule  IBUPROFEN PO  Multiple Vitamins-Minerals (MULTIVITAMIN ADULT PO)  propranolol ER (INDERAL LA) 60 MG 24 hr capsule  SUMAtriptan (IMITREX) 100 MG tablet  TiZANidine HCl (ZANAFLEX PO)  vitamin C (ASCORBIC ACID) 500 MG tablet          Review of Systems   Constitutional: Negative.    HENT:  Positive for dental problem.    Eyes: Negative.    Respiratory: Negative.     Cardiovascular: Negative.    Gastrointestinal: Negative.    Endocrine: Negative.    Genitourinary: Negative.    Musculoskeletal: Negative.    Neurological: Negative.    Psychiatric/Behavioral: Negative.         Physical Exam   BP: 131/83  Pulse: 80  Temp: 98.7  F (37.1  C)  Resp: 18  SpO2: 98 %      Physical Exam  Vitals and nursing note reviewed.   Constitutional:       Appearance: Normal appearance.   HENT:      Right Ear: Tympanic membrane, ear canal and external ear normal.      Left Ear: Tympanic  membrane, ear canal and external ear normal.      Nose: Congestion and rhinorrhea present.      Mouth/Throat:      Mouth: Mucous membranes are moist.      Pharynx: No oropharyngeal exudate or posterior oropharyngeal erythema.        Comments: 17 and 19 tooth removed.  The sockets look good the areas are starting to close there is no significant redness or swelling of the gingiva.  I do not see any drainage.  Her jaws not swollen overall I feel these are healing well.  Eyes:      Extraocular Movements: Extraocular movements intact.      Conjunctiva/sclera: Conjunctivae normal.      Pupils: Pupils are equal, round, and reactive to light.   Neurological:      Mental Status: She is alert.         ED Course        Procedures                  No results found for this or any previous visit (from the past 24 hour(s)).    Medications - No data to display    Assessments & Plan (with Medical Decision Making)     I have reviewed the nursing notes.    I have reviewed the findings, diagnosis, plan and need for follow up with the patient.          Medical Decision Making  49-year-old female no acute distress her vitals are stable.  Patient came in for reassurance that her 2 teeth that she had pulled on Monday are not infected patient had tooth #17 and 19 removed.  The sockets look good.  There is no redness swelling or pain.  There is no signs of any drainage.  No jaw swelling.  Reassurance at this time that the swelling and discomfort is probably still probable since it was just removed on Monday.  Continue ibuprofen Tylenol and instructions by dentist.  Return if symptoms worsen or change discharged in stable condition.    Discharge Medication List as of 9/13/2024 10:14 AM          Final diagnoses:   Pain, dental       9/13/2024   [facility]       Aneta Amor PA-C  09/13/24 1600

## 2024-09-25 ENCOUNTER — HOSPITAL ENCOUNTER (EMERGENCY)
Facility: HOSPITAL | Age: 49
Discharge: HOME | End: 2024-09-26
Attending: EMERGENCY MEDICINE
Payer: MEDICAID

## 2024-09-25 DIAGNOSIS — M27.3 DRY SOCKET: ICD-10-CM

## 2024-09-25 PROCEDURE — 99283 EMERGENCY DEPT VISIT LOW MDM: CPT | Performed by: EMERGENCY MEDICINE

## 2024-09-25 PROCEDURE — 99282 EMERGENCY DEPT VISIT SF MDM: CPT | Performed by: EMERGENCY MEDICINE

## 2024-09-25 ASSESSMENT — COLUMBIA-SUICIDE SEVERITY RATING SCALE - C-SSRS
1. IN THE PAST MONTH, HAVE YOU WISHED YOU WERE DEAD OR WISHED YOU COULD GO TO SLEEP AND NOT WAKE UP?: NO
2. HAVE YOU ACTUALLY HAD ANY THOUGHTS OF KILLING YOURSELF IN THE PAST MONTH?: NO
6. HAVE YOU EVER DONE ANYTHING, STARTED TO DO ANYTHING, OR PREPARED TO DO ANYTHING TO END YOUR LIFE?: NO

## 2024-09-25 ASSESSMENT — ACTIVITIES OF DAILY LIVING (ADL): ADLS_ACUITY_SCORE: 33

## 2024-09-26 VITALS
OXYGEN SATURATION: 100 % | HEART RATE: 82 BPM | DIASTOLIC BLOOD PRESSURE: 81 MMHG | SYSTOLIC BLOOD PRESSURE: 120 MMHG | TEMPERATURE: 97.5 F | RESPIRATION RATE: 16 BRPM

## 2024-09-26 ASSESSMENT — ACTIVITIES OF DAILY LIVING (ADL): ADLS_ACUITY_SCORE: 35

## 2024-09-26 NOTE — ED TRIAGE NOTES
Reports she had some teeth, left lower jaw, pulled 2.5 weeks ago. Was packed for dry socket. Having some shooting pains starting yesterday. Wants to make sure its not infected.

## 2024-09-26 NOTE — DISCHARGE INSTRUCTIONS
Please continue taking antibiotics as directed.  Please call your dentist tomorrow for re-evaluation.

## 2024-09-26 NOTE — ED PROVIDER NOTES
History     Chief Complaint   Patient presents with    Dental Pain     HPI  Venu Amaya is a 49 year old female who presents for dental evaluation. Patient had dental extraction approx 2 weeks ago; being treated for dry socket and wants to be evaluated as she had some pain tonight; denies pain at this time.      Allergies:  Allergies   Allergen Reactions    Amoxicillin Swelling     Hands swell     Levofloxacin Other (See Comments)     Unknown reaction - Levaquin     Other [No Clinical Screening - See Comments]      utapar drop in blood pressure    Pcn [Penicillins]     Ritodrine        Problem List:    Patient Active Problem List    Diagnosis Date Noted    S/P cervical spinal fusion 03/29/2018     Priority: Medium    Neural foraminal stenosis of cervical spine 10/05/2017     Priority: Medium    Opioid dependence (H) 12/23/2015     Priority: Medium    Long term (current) use of opiate analgesic 12/23/2015     Priority: Medium    Chronic low back pain 11/02/2015     Priority: Medium     Overview:   IMO Update      Headache 10/23/2014     Priority: Medium     Overview:   10/23/14: According to neurology consultation of 9/2/14, previous treatments  include pretty much all of the triptans, Maxalt, Imitrex, Amerge, Axert, Frova, and Zomig. She reports that she has tried these when she did not have a constant headache and they did not work when she took them at first onset nor do they work now. She recently tried Imitrex injections and has tried a nasal spray in the past without improvement. The patient does take Compazine for the nausea and reports that that helps somewhat. She has tried numerous preventative medications as well including Topamax, Depakote, gabapentin, Tegretol, Dilantin, nortriptyline, propranolol, and verapamil per her report. She has tried physical therapy as well as trigger injections and denies that any of these things are helpful. She does report she takes ibuprofen 800 mg twice per day for  ankylosing spondylitis. For her headaches, the only thing she has found that helped is Tylenol with Codeine.       Cervicalgia 05/15/2014     Priority: Medium    Myalgia and myositis 05/15/2014     Priority: Medium    Abnormal pap 07/30/2013     Priority: Medium    Ankylosing spondylitis (H) 05/24/2012     Priority: Medium     Flori AN       Dysthymic disorder 09/13/2010     Priority: Medium    Anxiety state 06/02/2009     Priority: Medium     Problem list name updated by automated process. Provider to review      Depressive disorder, not elsewhere classified 10/31/2007     Priority: Medium    Migraine 01/15/2003     Priority: Medium     Problem list name updated by automated process. 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    Inguinal hernia without mention of obstruction or gangrene, unilateral or unspecified, (not specified as recurrent) 06/16/2008    Irregular menstrual cycle 05/25/2011    Mental disorders of mother, postpartum 10/06/2005    Migraine, unspecified, without mention of intractable migraine without mention of status migrainosus 01/15/2003    Moderate dysplasia of cervix 11/03/2011    Pain in thoracic spine 11/03/2011    Scoliosis (and kyphoscoliosis), idiopathic 05/16/2011       Past Surgical History:    Past Surgical History:   Procedure Laterality Date    HEAD & NECK SURGERY      inguinal hernia repair, Germain repair  10/14/2008    RT     TUBAL LIGATION         Family History:    Family History   Problem Relation Age of Onset    Cancer Mother          lymphoma    Cancer Other     Cancer Other     Colon Cancer Sister        Social History:  Marital Status:   [2]  Social History     Tobacco Use    Smoking status: Some Days     Current packs/day: 0.00     Types: Cigarettes     Last attempt to quit: 2015     Years since quittin.2    Smokeless tobacco: Never    Tobacco comments:     Passive Exposure: Yes    Substance Use Topics    Alcohol use: No    Drug use: No        Medications:    acetaminophen-codeine (TYLENOL/CODEINE #3) 300-30 MG per tablet  baclofen (LIORESAL) 10 MG tablet  clonazePAM (KLONOPIN) 0.5 MG tablet  clorazepate (TRANXENE) 3.75 MG tablet  gabapentin (NEURONTIN) 300 MG capsule  hydrOXYzine (ATARAX) 25 MG tablet  hydrOXYzine (VISTARIL) 25 MG capsule  IBUPROFEN PO  Multiple Vitamins-Minerals (MULTIVITAMIN ADULT PO)  propranolol ER (INDERAL LA) 60 MG 24 hr capsule  SUMAtriptan (IMITREX) 100 MG tablet  TiZANidine HCl (ZANAFLEX PO)  vitamin C (ASCORBIC ACID) 500 MG tablet          Review of Systems    All systems reviewed and negative with exception of that stated in the HPI      Physical Exam   BP: 132/87  Pulse: 82  Temp: 97.5  F (36.4  C)  Resp: 16  SpO2: 100 %      Physical Exam    INITIAL VITAL SIGNS: Reviewed by me   GENERAL: Alert. Well developed and well nourished   HEAD: Normocephalic   EYES: No conjunctival injection   ENT: Oral mucosa is moist   #22 s/p extraction no dry socket paste in however no dry socket  #17 with no drysocket fill present  No gingival fluctuance or dental ttp  NECK: Supple. No masses. Full range of motion  RESPIRATORY: Non-labored respirations   CV: 2+ peripheral pulses   EXTREMITIES: No deformity   SKIN: Warm and dry   NEUROLOGIC: Alert     ED Course            No results found for this or any previous visit (from the past 24 hour(s)).    Medications - No data to display    Assessments & Plan (with Medical Decision Making)     I have reviewed the nursing notes.    Venu Amaya is a 49 year old female  who presents for dental evaluation. S/p extraction weeks ago, treated for dry socket; presents with some pain which has been resolved; #22 healing well, added dry socket paste to small cavity; otherwise no evidence of dry socket  Continue antibiotics  Follow-up with dentist tomorrow  Return precautions given      New Prescriptions    No medications on file       Final diagnoses:   Dry socket       9/25/2024   [facility]       Shana Torres MD  09/26/24 0058     Deteriorating patient status - Patient was clinically upgraded due to deteriorating patient status.

## 2024-10-21 ENCOUNTER — HOSPITAL ENCOUNTER (EMERGENCY)
Facility: HOSPITAL | Age: 49
Discharge: HOME | End: 2024-10-21
Attending: NURSE PRACTITIONER | Admitting: NURSE PRACTITIONER
Payer: MEDICAID

## 2024-10-21 VITALS
OXYGEN SATURATION: 100 % | SYSTOLIC BLOOD PRESSURE: 119 MMHG | WEIGHT: 103 LBS | DIASTOLIC BLOOD PRESSURE: 78 MMHG | RESPIRATION RATE: 16 BRPM | HEART RATE: 89 BPM | BODY MASS INDEX: 17.58 KG/M2 | HEIGHT: 64 IN | TEMPERATURE: 98.3 F

## 2024-10-21 DIAGNOSIS — R53.1 GENERAL WEAKNESS: Primary | ICD-10-CM

## 2024-10-21 DIAGNOSIS — Z20.822 CONTACT WITH AND (SUSPECTED) EXPOSURE TO COVID-19: ICD-10-CM

## 2024-10-21 LAB
FLUAV RNA SPEC QL NAA+PROBE: NEGATIVE
FLUBV RNA RESP QL NAA+PROBE: NEGATIVE
RSV RNA SPEC NAA+PROBE: NEGATIVE
SARS-COV-2 RNA RESP QL NAA+PROBE: NEGATIVE

## 2024-10-21 PROCEDURE — G0463 HOSPITAL OUTPT CLINIC VISIT: HCPCS

## 2024-10-21 PROCEDURE — 99213 OFFICE O/P EST LOW 20 MIN: CPT | Performed by: NURSE PRACTITIONER

## 2024-10-21 PROCEDURE — 87637 SARSCOV2&INF A&B&RSV AMP PRB: CPT | Performed by: NURSE PRACTITIONER

## 2024-10-21 ASSESSMENT — ENCOUNTER SYMPTOMS
APPETITE CHANGE: 0
SHORTNESS OF BREATH: 1
LIGHT-HEADEDNESS: 1
FATIGUE: 1
WEAKNESS: 1
FEVER: 0
SORE THROAT: 0
CHILLS: 0
ABDOMINAL PAIN: 0
PALPITATIONS: 0

## 2024-10-21 ASSESSMENT — ACTIVITIES OF DAILY LIVING (ADL): ADLS_ACUITY_SCORE: 33

## 2024-10-21 NOTE — ED TRIAGE NOTES
ELPIDIO Aguayo CNP assessed patient in triage and determined patient Urgent Care appropriate. Will be seen in Urgent Care.

## 2024-10-21 NOTE — DISCHARGE INSTRUCTIONS
Your lungs sound clear.  Heart sounds good.  Your blood pressure, heart rate and oxygen all look good today.  Given your recent exposure to COVID 19 we tested you for this and we will notify you of your results when we get them.    In the meantime Tylenol or ibuprofen as needed for pain.  Drink plenty of fluids so you are staying hydrated and make sure you are eating a well-balanced diet.    Follow-up with your primary doctor as needed if no improvement in symptoms.  Return to urgent care or emergency department for any worsening or concerning symptoms.

## 2024-10-21 NOTE — ED PROVIDER NOTES
History     Chief Complaint   Patient presents with    Generalized Weakness     HPI  Venu Amaya is a 49 year old female who presents with her daughter for evaluation.  Patient tells me that over the last 2 days she has had generalized weakness, shortness of breath and intermittent lightheadedness.  She intermittently gets some slight throat discomfort/neck tightness which she attributes to her chronic cervical stenosis and/or her anxiety.  She did take a Klonopin and that seemed to help with this.  Her daughter, who is with her currently, was recently diagnosed with COVID-19 infection.  No cough, chest pain, fever or chills.  No abdominal pain, N/V/D.  She still eating and drinking well.  She still ambulating independently with minimal difficulty.  She is concerned that she may have COVID-19 infection so she presented here for evaluation.      Allergies:  Allergies   Allergen Reactions    Amoxicillin Swelling     Hands swell     Levofloxacin Other (See Comments)     Unknown reaction - Levaquin     Other [No Clinical Screening - See Comments]      utapar drop in blood pressure    Pcn [Penicillins]     Ritodrine        Problem List:    Patient Active Problem List    Diagnosis Date Noted    S/P cervical spinal fusion 03/29/2018     Priority: Medium    Neural foraminal stenosis of cervical spine 10/05/2017     Priority: Medium    Opioid dependence (H) 12/23/2015     Priority: Medium    Long term (current) use of opiate analgesic 12/23/2015     Priority: Medium    Chronic low back pain 11/02/2015     Priority: Medium     Overview:   IMO Update      Headache 10/23/2014     Priority: Medium     Overview:   10/23/14: According to neurology consultation of 9/2/14, previous treatments  include pretty much all of the triptans, Maxalt, Imitrex, Amerge, Axert, Frova, and Zomig. She reports that she has tried these when she did not have a constant headache and they did not work when she took them at first onset nor do  they work now. She recently tried Imitrex injections and has tried a nasal spray in the past without improvement. The patient does take Compazine for the nausea and reports that that helps somewhat. She has tried numerous preventative medications as well including Topamax, Depakote, gabapentin, Tegretol, Dilantin, nortriptyline, propranolol, and verapamil per her report. She has tried physical therapy as well as trigger injections and denies that any of these things are helpful. She does report she takes ibuprofen 800 mg twice per day for ankylosing spondylitis. For her headaches, the only thing she has found that helped is Tylenol with Codeine.       Cervicalgia 05/15/2014     Priority: Medium    Myalgia and myositis 05/15/2014     Priority: Medium    Abnormal pap 07/30/2013     Priority: Medium    Ankylosing spondylitis (H) 05/24/2012     Priority: Medium     Flori Finch PAC       Dysthymic disorder 09/13/2010     Priority: Medium    Anxiety state 06/02/2009     Priority: Medium     Problem list name updated by automated process. Provider to review      Depressive disorder, not elsewhere classified 10/31/2007     Priority: Medium    Migraine 01/15/2003     Priority: Medium     Problem list name updated by automated process. 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    Inguinal hernia without mention of obstruction or gangrene, unilateral or unspecified, (not specified as recurrent) 06/16/2008    Irregular menstrual cycle 05/25/2011    Mental disorders of mother, postpartum 10/06/2005     Migraine, unspecified, without mention of intractable migraine without mention of status migrainosus 01/15/2003    Moderate dysplasia of cervix 2011    Pain in thoracic spine 2011    Scoliosis (and kyphoscoliosis), idiopathic 2011       Past Surgical History:    Past Surgical History:   Procedure Laterality Date    HEAD & NECK SURGERY      inguinal hernia repair, Germain repair  10/14/2008    RT     TUBAL LIGATION         Family History:    Family History   Problem Relation Age of Onset    Cancer Mother         lymphoma    Cancer Other     Cancer Other     Colon Cancer Sister        Social History:  Marital Status:   [2]  Social History     Tobacco Use    Smoking status: Some Days     Current packs/day: 0.00     Types: Cigarettes     Last attempt to quit: 2015     Years since quittin.2    Smokeless tobacco: Never    Tobacco comments:     Passive Exposure: Yes    Substance Use Topics    Alcohol use: No    Drug use: No        Medications:    acetaminophen-codeine (TYLENOL/CODEINE #3) 300-30 MG per tablet  baclofen (LIORESAL) 10 MG tablet  clonazePAM (KLONOPIN) 0.5 MG tablet  clorazepate (TRANXENE) 3.75 MG tablet  gabapentin (NEURONTIN) 300 MG capsule  hydrOXYzine (ATARAX) 25 MG tablet  hydrOXYzine (VISTARIL) 25 MG capsule  IBUPROFEN PO  Multiple Vitamins-Minerals (MULTIVITAMIN ADULT PO)  propranolol ER (INDERAL LA) 60 MG 24 hr capsule  SUMAtriptan (IMITREX) 100 MG tablet  TiZANidine HCl (ZANAFLEX PO)  vitamin C (ASCORBIC ACID) 500 MG tablet          Review of Systems   Constitutional:  Positive for fatigue. Negative for appetite change, chills and fever.   HENT:  Negative for sore throat.    Respiratory:  Positive for shortness of breath.    Cardiovascular:  Negative for chest pain, palpitations and leg swelling.   Gastrointestinal:  Negative for abdominal pain.   Neurological:  Positive for weakness and light-headedness.   All other systems reviewed and are  "negative.      Physical Exam   BP: 119/78  Pulse: 89  Temp: 98.3  F (36.8  C)  Resp: 16  Height: 162.6 cm (5' 4\")  Weight: 46.7 kg (103 lb)  SpO2: 100 %      Physical Exam  Vitals and nursing note reviewed.   Constitutional:       General: She is not in acute distress.     Appearance: Normal appearance. She is well-developed. She is not ill-appearing, toxic-appearing or diaphoretic.   HENT:      Head: Normocephalic and atraumatic.      Jaw: There is normal jaw occlusion. No trismus.      Right Ear: Tympanic membrane and ear canal normal.      Left Ear: Tympanic membrane and ear canal normal.      Nose: Nose normal.      Mouth/Throat:      Mouth: Mucous membranes are moist.      Pharynx: Oropharynx is clear. Uvula midline. No oropharyngeal exudate or uvula swelling.      Tonsils: No tonsillar exudate or tonsillar abscesses. 0 on the right. 0 on the left.   Eyes:      Conjunctiva/sclera: Conjunctivae normal.      Pupils: Pupils are equal, round, and reactive to light.   Cardiovascular:      Rate and Rhythm: Normal rate and regular rhythm.      Heart sounds: Normal heart sounds. No murmur heard.     No friction rub.   Pulmonary:      Effort: Pulmonary effort is normal. No respiratory distress.      Breath sounds: Normal breath sounds. No stridor. No wheezing or rhonchi.   Abdominal:      General: Abdomen is flat.      Palpations: Abdomen is soft.      Tenderness: There is no abdominal tenderness. There is no guarding or rebound.   Musculoskeletal:         General: Normal range of motion.      Cervical back: Normal range of motion and neck supple.   Skin:     General: Skin is warm and dry.      Coloration: Skin is not pale.      Findings: No erythema.   Neurological:      General: No focal deficit present.      Mental Status: She is alert and oriented to person, place, and time.      GCS: GCS eye subscore is 4. GCS verbal subscore is 5. GCS motor subscore is 6.      Cranial Nerves: Cranial nerves 2-12 are intact. No " cranial nerve deficit.      Sensory: Sensation is intact. No sensory deficit.      Motor: Motor function is intact. No weakness.      Coordination: Coordination is intact. Coordination normal.      Gait: Gait normal.         ED Course        Procedures       No results found for this or any previous visit (from the past 24 hour(s)).    Medications - No data to display    Assessments & Plan (with Medical Decision Making)   49-year-old female that was recently exposed to COVID-19 and presented here with concerns of COVID-19 infection.  Heart rate and rhythm are regular.  Her respirations are even and unlabored.  She is talking clearly and in full sentences.  Oxygen saturation was 100% on room air.  No focal neurological deficits.  Reports her symptoms are similar to how her daughter's COVID-19 symptoms started.  Overall patient looks well.  She was tested for COVID-19, influenza and RSV with results pending at discharge.  She opted to be notified of results when they are available.    In the meantime I recommended Tylenol or ibuprofen as needed for any pain, making sure she stays hydrated and continue monitoring her symptoms.  If she develops any worsening or concerning symptoms she will return to urgent care/emergency department for evaluation.  If she test negative for COVID-19 and symptoms persist she will follow-up with her primary doctor.    I have reviewed the nursing notes.    I have reviewed the findings, diagnosis, plan and need for follow up with the patient.  This document was prepared using a combination of typing and voice generated software.  While every attempt was made for accuracy, spelling and grammatical errors may exist.         Discharge Medication List as of 10/21/2024  2:36 PM          Final diagnoses:   General weakness   Contact with and (suspected) exposure to covid-19       10/21/2024   [facility]       Mpofu, Prudence, CNP  10/21/24 1270

## 2024-11-05 ENCOUNTER — HOSPITAL ENCOUNTER (EMERGENCY)
Facility: HOSPITAL | Age: 49
Discharge: HOME | End: 2024-11-05
Attending: PHYSICIAN ASSISTANT | Admitting: PHYSICIAN ASSISTANT
Payer: COMMERCIAL

## 2024-11-05 VITALS
TEMPERATURE: 99 F | DIASTOLIC BLOOD PRESSURE: 84 MMHG | HEART RATE: 73 BPM | OXYGEN SATURATION: 96 % | RESPIRATION RATE: 16 BRPM | WEIGHT: 102 LBS | HEIGHT: 65 IN | SYSTOLIC BLOOD PRESSURE: 126 MMHG | BODY MASS INDEX: 17 KG/M2

## 2024-11-05 DIAGNOSIS — G89.18 POSTOPERATIVE PAIN: ICD-10-CM

## 2024-11-05 DIAGNOSIS — M54.12 CERVICAL RADICULOPATHY: ICD-10-CM

## 2024-11-05 LAB
BASOPHILS # BLD AUTO: 0 10E3/UL (ref 0–0.2)
BASOPHILS NFR BLD AUTO: 1 %
EOSINOPHIL # BLD AUTO: 0.1 10E3/UL (ref 0–0.7)
EOSINOPHIL NFR BLD AUTO: 2 %
ERYTHROCYTE [DISTWIDTH] IN BLOOD BY AUTOMATED COUNT: 12.3 % (ref 10–15)
HCT VFR BLD AUTO: 42 % (ref 35–47)
HGB BLD-MCNC: 13.9 G/DL (ref 11.7–15.7)
HOLD SPECIMEN: NORMAL
IMM GRANULOCYTES # BLD: 0 10E3/UL
IMM GRANULOCYTES NFR BLD: 0 %
LYMPHOCYTES # BLD AUTO: 1.8 10E3/UL (ref 0.8–5.3)
LYMPHOCYTES NFR BLD AUTO: 33 %
MCH RBC QN AUTO: 29.9 PG (ref 26.5–33)
MCHC RBC AUTO-ENTMCNC: 33.1 G/DL (ref 31.5–36.5)
MCV RBC AUTO: 90 FL (ref 78–100)
MONOCYTES # BLD AUTO: 0.3 10E3/UL (ref 0–1.3)
MONOCYTES NFR BLD AUTO: 6 %
NEUTROPHILS # BLD AUTO: 3.2 10E3/UL (ref 1.6–8.3)
NEUTROPHILS NFR BLD AUTO: 59 %
NRBC # BLD AUTO: 0 10E3/UL
NRBC BLD AUTO-RTO: 0 /100
PLATELET # BLD AUTO: 157 10E3/UL (ref 150–450)
RBC # BLD AUTO: 4.65 10E6/UL (ref 3.8–5.2)
WBC # BLD AUTO: 5.4 10E3/UL (ref 4–11)

## 2024-11-05 PROCEDURE — 99283 EMERGENCY DEPT VISIT LOW MDM: CPT | Performed by: PHYSICIAN ASSISTANT

## 2024-11-05 PROCEDURE — 36415 COLL VENOUS BLD VENIPUNCTURE: CPT | Performed by: PHYSICIAN ASSISTANT

## 2024-11-05 PROCEDURE — 99283 EMERGENCY DEPT VISIT LOW MDM: CPT

## 2024-11-05 PROCEDURE — 85004 AUTOMATED DIFF WBC COUNT: CPT | Performed by: PHYSICIAN ASSISTANT

## 2024-11-05 PROCEDURE — 250N000013 HC RX MED GY IP 250 OP 250 PS 637: Performed by: PHYSICIAN ASSISTANT

## 2024-11-05 RX ORDER — DIAZEPAM 5 MG/1
5 TABLET ORAL ONCE
Status: COMPLETED | OUTPATIENT
Start: 2024-11-05 | End: 2024-11-05

## 2024-11-05 RX ADMIN — DIAZEPAM 5 MG: 5 TABLET ORAL at 11:21

## 2024-11-05 ASSESSMENT — ENCOUNTER SYMPTOMS
FEVER: 0
WEAKNESS: 0
FATIGUE: 0
SHORTNESS OF BREATH: 0
DIZZINESS: 0

## 2024-11-05 ASSESSMENT — COLUMBIA-SUICIDE SEVERITY RATING SCALE - C-SSRS
2. HAVE YOU ACTUALLY HAD ANY THOUGHTS OF KILLING YOURSELF IN THE PAST MONTH?: NO
6. HAVE YOU EVER DONE ANYTHING, STARTED TO DO ANYTHING, OR PREPARED TO DO ANYTHING TO END YOUR LIFE?: NO
1. IN THE PAST MONTH, HAVE YOU WISHED YOU WERE DEAD OR WISHED YOU COULD GO TO SLEEP AND NOT WAKE UP?: NO

## 2024-11-05 ASSESSMENT — ACTIVITIES OF DAILY LIVING (ADL): ADLS_ACUITY_SCORE: 0

## 2024-11-05 NOTE — ED PROVIDER NOTES
History     Chief Complaint   Patient presents with    Post-op Problem     The history is provided by the patient.     Venu Amaya is a 49 year old female who presented to the emergency department for evaluation of neck pain.  The patient underwent radiofrequency neurotomy of the left third occipital nerve as well as C3 and C4 medial branches using fluoroscopic guidance in the [facility] on October 31.  Reports that she has been having persistent discomfort in her left neck as well as radiation down her left arm and into her left scapula.  No unusual swelling.  She is concerned she may have an infection.  She has not followed up with her primary care or the surgeon performing the procedure.    Allergies:  Allergies   Allergen Reactions    Amoxicillin Swelling     Hands swell     Levofloxacin Other (See Comments)     Unknown reaction - Levaquin     Other [No Clinical Screening - See Comments]      utapar drop in blood pressure    Pcn [Penicillins]     Ritodrine        Problem List:    Patient Active Problem List    Diagnosis Date Noted    S/P cervical spinal fusion 03/29/2018     Priority: Medium    Neural foraminal stenosis of cervical spine 10/05/2017     Priority: Medium    Opioid dependence (H) 12/23/2015     Priority: Medium    Long term (current) use of opiate analgesic 12/23/2015     Priority: Medium    Chronic low back pain 11/02/2015     Priority: Medium     Overview:   IMO Update      Headache 10/23/2014     Priority: Medium     Overview:   10/23/14: According to neurology consultation of 9/2/14, previous treatments  include pretty much all of the triptans, Maxalt, Imitrex, Amerge, Axert, Frova, and Zomig. She reports that she has tried these when she did not have a constant headache and they did not work when she took them at first onset nor do they work now. She recently tried Imitrex injections and has tried a nasal spray in the past without improvement. The patient does take Compazine  for the nausea and reports that that helps somewhat. She has tried numerous preventative medications as well including Topamax, Depakote, gabapentin, Tegretol, Dilantin, nortriptyline, propranolol, and verapamil per her report. She has tried physical therapy as well as trigger injections and denies that any of these things are helpful. She does report she takes ibuprofen 800 mg twice per day for ankylosing spondylitis. For her headaches, the only thing she has found that helped is Tylenol with Codeine.       Cervicalgia 05/15/2014     Priority: Medium    Myalgia and myositis 05/15/2014     Priority: Medium    Abnormal pap 07/30/2013     Priority: Medium    Ankylosing spondylitis (H) 05/24/2012     Priority: Medium     Flori AN       Dysthymic disorder 09/13/2010     Priority: Medium    Anxiety state 06/02/2009     Priority: Medium     Problem list name updated by automated process. Provider to review      Depressive disorder, not elsewhere classified 10/31/2007     Priority: Medium    Migraine 01/15/2003     Priority: Medium     Problem list name updated by automated process. 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    Inguinal hernia without mention of obstruction or gangrene, unilateral or unspecified, (not specified as recurrent) 06/16/2008    Irregular menstrual cycle 05/25/2011    Mental disorders of mother, postpartum 10/06/2005    Migraine, unspecified, without mention of intractable migraine without mention of status migrainosus 01/15/2003    Moderate dysplasia of cervix  "2011    Pain in thoracic spine 2011    Scoliosis (and kyphoscoliosis), idiopathic 2011       Past Surgical History:    Past Surgical History:   Procedure Laterality Date    HEAD & NECK SURGERY      inguinal hernia repair, Germain repair  10/14/2008    RT     TUBAL LIGATION         Family History:    Family History   Problem Relation Age of Onset    Cancer Mother         lymphoma    Cancer Other     Cancer Other     Colon Cancer Sister        Social History:  Marital Status:   [2]  Social History     Tobacco Use    Smoking status: Some Days     Current packs/day: 0.00     Types: Cigarettes     Last attempt to quit: 2015     Years since quittin.3    Smokeless tobacco: Never    Tobacco comments:     Passive Exposure: Yes    Substance Use Topics    Alcohol use: No    Drug use: No        Medications:    acetaminophen-codeine (TYLENOL/CODEINE #3) 300-30 MG per tablet  baclofen (LIORESAL) 10 MG tablet  clonazePAM (KLONOPIN) 0.5 MG tablet  clorazepate (TRANXENE) 3.75 MG tablet  gabapentin (NEURONTIN) 300 MG capsule  hydrOXYzine (ATARAX) 25 MG tablet  hydrOXYzine (VISTARIL) 25 MG capsule  IBUPROFEN PO  Multiple Vitamins-Minerals (MULTIVITAMIN ADULT PO)  propranolol ER (INDERAL LA) 60 MG 24 hr capsule  SUMAtriptan (IMITREX) 100 MG tablet  TiZANidine HCl (ZANAFLEX PO)  vitamin C (ASCORBIC ACID) 500 MG tablet          Review of Systems   Constitutional:  Negative for fatigue and fever.   HENT:          See HPI   Respiratory:  Negative for shortness of breath.    Allergic/Immunologic: Negative for immunocompromised state.   Neurological:  Negative for dizziness and weakness.       Physical Exam   BP: 137/92  Pulse: 101  Temp: 99  F (37.2  C)  Resp: 16  Height: 165.1 cm (5' 5\")  Weight: 46.3 kg (102 lb)  SpO2: 99 %      Physical Exam  Vitals and nursing note reviewed.   Constitutional:       General: She is not in acute distress.     Appearance: Normal appearance. She is normal weight. She " is not ill-appearing, toxic-appearing or diaphoretic.   Neck:      Comments: There is no appreciable heat, significant erythema, petechiae, purpura, swelling, or other concerning features noted to the neck or occipital area of the head.  Cardiovascular:      Rate and Rhythm: Normal rate and regular rhythm.   Pulmonary:      Effort: Pulmonary effort is normal.   Musculoskeletal:      Comments: Examination reveals no appreciable or significant erythema, petechiae, purpura, vesicles, heat, or fluctuance of the neck or cervical spine   Skin:     General: Skin is warm and dry.      Capillary Refill: Capillary refill takes less than 2 seconds.   Neurological:      General: No focal deficit present.      Mental Status: She is alert and oriented to person, place, and time.      Comments: There is no evidence of weakness or other concerning features of myelopathy.   Psychiatric:         Mood and Affect: Mood normal.         Behavior: Behavior normal.         ED Course     ED Course as of 11/05/24 2138   Tue Nov 05, 2024   1059 Broad differential diagnosis is considered and includes but is not limited to radiculopathy symptoms secondary to the procedure, infection, vertebral artery dissection, epidural hematoma, epidural abscess.     Procedures              Critical Care time:  none              Results for orders placed or performed during the hospital encounter of 11/05/24 (from the past 24 hours)   CBC with platelets differential    Narrative    The following orders were created for panel order CBC with platelets differential.  Procedure                               Abnormality         Status                     ---------                               -----------         ------                     CBC with platelets and d...[[id number]]                      Final result                 Please view results for these tests on the individual orders.   CBC with platelets and differential   Result Value Ref Range    WBC Count  5.4 4.0 - 11.0 10e3/uL    RBC Count 4.65 3.80 - 5.20 10e6/uL    Hemoglobin 13.9 11.7 - 15.7 g/dL    Hematocrit 42.0 35.0 - 47.0 %    MCV 90 78 - 100 fL    MCH 29.9 26.5 - 33.0 pg    MCHC 33.1 31.5 - 36.5 g/dL    RDW 12.3 10.0 - 15.0 %    Platelet Count 157 150 - 450 10e3/uL    % Neutrophils 59 %    % Lymphocytes 33 %    % Monocytes 6 %    % Eosinophils 2 %    % Basophils 1 %    % Immature Granulocytes 0 %    NRBCs per 100 WBC 0 <1 /100    Absolute Neutrophils 3.2 1.6 - 8.3 10e3/uL    Absolute Lymphocytes 1.8 0.8 - 5.3 10e3/uL    Absolute Monocytes 0.3 0.0 - 1.3 10e3/uL    Absolute Eosinophils 0.1 0.0 - 0.7 10e3/uL    Absolute Basophils 0.0 0.0 - 0.2 10e3/uL    Absolute Immature Granulocytes 0.0 <=0.4 10e3/uL    Absolute NRBCs 0.0 10e3/uL   Extra Tube    Narrative    The following orders were created for panel order Extra Tube.  Procedure                               Abnormality         Status                     ---------                               -----------         ------                     Extra Blue Top Tube[[id number]]                              Final result               Extra Red Top Tube[[id number]]                               Final result               Extra Green Top (Lithium...[[id number]]                      Final result               Extra Heparinized Syringe[[id number]]                        Final result                 Please view results for these tests on the individual orders.   Extra Blue Top Tube   Result Value Ref Range    Hold Specimen JIC    Extra Red Top Tube   Result Value Ref Range    Hold Specimen JIC    Extra Green Top (Lithium Heparin) Tube   Result Value Ref Range    Hold Specimen JIC    Extra Heparinized Syringe   Result Value Ref Range    Hold Specimen JIC        Medications   diazepam (VALIUM) tablet 5 mg (5 mg Oral $Given 11/5/24 1121)       Assessments & Plan (with Medical Decision Making)   49-year-old female who presented to the emergency department for evaluation of  persistent left-sided neck discomfort with radicular symptoms.  She has no high risk red flag features.  Broad differential was considered with broad workup in the emergency department to include laboratory evaluation for the patient's concern for possible infection.  She has no evidence of myelopathy or other concerning features of epidural hematoma or epidural abscess.  There is no concerning features of infection at this visit.  She has no leukocytosis or tachycardia or fevers.  She has no chills.  At this point I do not believe that antibiotics would be in her best medical interest.  I had a long and exhaustive conversation with the patient with her  in the room.  They voiced complete understanding of my discussion as well as possible etiologies and need for further evaluation as an outpatient.  She is scheduled for follow-up on Thursday.  They also agree with low yield and high risk of antibiotics at this time.  There is no reasonable indication for CT angiogram of the head and neck on an emergent basis as well.  Strict return precautions were provided.    This document was prepared using a combination of typing and voice generated software.  While every attempt was made for accuracy, spelling and grammatical errors may exist.   I have reviewed the nursing notes.    I have reviewed the findings, diagnosis, plan and need for follow up with the patient.           Medical Decision Making  The patient's presentation was of moderate complexity (an undiagnosed new problem with uncertain prognosis).    The patient's evaluation involved:  strong consideration of a test (CT angio of the head and neck) that was ultimately deferred  ordering and/or review of 1 test(s) in this encounter (CBC)    The patient's management necessitated moderate risk (prescription drug management including medications given in the ED).        Discharge Medication List as of 11/5/2024 11:58 AM          Final diagnoses:   Cervical  radiculopathy   Postoperative pain       11/5/2024   [facility]       Donavan Kennedy PA-C  11/05/24 [id number]

## 2024-11-05 NOTE — ED TRIAGE NOTES
Pt presents with that on Thursday had a cervical ablation on the left side. Yesterday pain increased and pain shooting down shoulder blade and felt still. Left arm feels more achy and sore.  Reports skin looks reddened and feels likes it burning. Pt had this done in [place] with Dr. Farrell with pain management.  Hasn't contacted them about this issue yet.

## 2024-11-05 NOTE — DISCHARGE INSTRUCTIONS
Your laboratory evaluation today was unrevealing for evidence of elevated white blood cell count or other concerning features.  This does not entirely rule out infectious etiology, however would argue against any concerning or advancing infection.  I highly suggest that you follow-up with your primary care provider as well as the surgeon who performed the procedure this week for recheck.  At this point, I believe that antibiotics would have more risks than benefits.  Please return here if you develop any fevers, worsening symptoms, or other questions or concerns.

## 2025-01-10 ENCOUNTER — HOSPITAL ENCOUNTER (EMERGENCY)
Facility: HOSPITAL | Age: 50
Discharge: HOME | End: 2025-01-10
Payer: COMMERCIAL

## 2025-01-10 VITALS
TEMPERATURE: 99 F | HEART RATE: 91 BPM | DIASTOLIC BLOOD PRESSURE: 82 MMHG | RESPIRATION RATE: 20 BRPM | SYSTOLIC BLOOD PRESSURE: 121 MMHG | OXYGEN SATURATION: 99 %

## 2025-01-10 DIAGNOSIS — B34.9 VIRAL ILLNESS: ICD-10-CM

## 2025-01-10 DIAGNOSIS — Z20.822 CONTACT WITH AND (SUSPECTED) EXPOSURE TO COVID-19: ICD-10-CM

## 2025-01-10 DIAGNOSIS — Z20.828 EXPOSURE TO INFLUENZA: ICD-10-CM

## 2025-01-10 PROCEDURE — 99213 OFFICE O/P EST LOW 20 MIN: CPT

## 2025-01-10 PROCEDURE — 99284 EMERGENCY DEPT VISIT MOD MDM: CPT

## 2025-01-10 PROCEDURE — 87637 SARSCOV2&INF A&B&RSV AMP PRB: CPT

## 2025-01-10 PROCEDURE — G0463 HOSPITAL OUTPT CLINIC VISIT: HCPCS

## 2025-01-10 PROCEDURE — 99283 EMERGENCY DEPT VISIT LOW MDM: CPT | Performed by: INTERNAL MEDICINE

## 2025-01-10 ASSESSMENT — ENCOUNTER SYMPTOMS
COUGH: 1
MYALGIAS: 1
FEVER: 0
SHORTNESS OF BREATH: 0
APPETITE CHANGE: 0
VOMITING: 0
ACTIVITY CHANGE: 0
DIARRHEA: 0
SORE THROAT: 0
NAUSEA: 0
CHILLS: 0

## 2025-01-10 ASSESSMENT — ACTIVITIES OF DAILY LIVING (ADL): ADLS_ACUITY_SCORE: 41

## 2025-01-10 NOTE — ED PROVIDER NOTES
History     Chief Complaint   Patient presents with    URI     HPI  Venu Amaya is a 49 year old female who presents to the urgent care with a 2 day history of cough and body aches. States she has been exposed to covid and influenza. She denies fevers, chills, and n/v/d. Previous smoker. No recent abx or OTC medications.     Allergies:  Allergies   Allergen Reactions    Amoxicillin Swelling     Hands swell     Levofloxacin Other (See Comments)     Unknown reaction - Levaquin     Other [No Clinical Screening - See Comments]      utapar drop in blood pressure    Pcn [Penicillins]     Ritodrine        Problem List:    Patient Active Problem List    Diagnosis Date Noted    S/P cervical spinal fusion 03/29/2018     Priority: Medium    Neural foraminal stenosis of cervical spine 10/05/2017     Priority: Medium    Opioid dependence (H) 12/23/2015     Priority: Medium    Long term (current) use of opiate analgesic 12/23/2015     Priority: Medium    Chronic low back pain 11/02/2015     Priority: Medium     Overview:   IMO Update      Headache 10/23/2014     Priority: Medium     Overview:   10/23/14: According to neurology consultation of 9/2/14, previous treatments  include pretty much all of the triptans, Maxalt, Imitrex, Amerge, Axert, Frova, and Zomig. She reports that she has tried these when she did not have a constant headache and they did not work when she took them at first onset nor do they work now. She recently tried Imitrex injections and has tried a nasal spray in the past without improvement. The patient does take Compazine for the nausea and reports that that helps somewhat. She has tried numerous preventative medications as well including Topamax, Depakote, gabapentin, Tegretol, Dilantin, nortriptyline, propranolol, and verapamil per her report. She has tried physical therapy as well as trigger injections and denies that any of these things are helpful. She does report she takes ibuprofen 800 mg twice  per day for ankylosing spondylitis. For her headaches, the only thing she has found that helped is Tylenol with Codeine.       Cervicalgia 05/15/2014     Priority: Medium    Myalgia and myositis 05/15/2014     Priority: Medium    Abnormal pap 07/30/2013     Priority: Medium    Ankylosing spondylitis (H) 05/24/2012     Priority: Medium     Flori AN       Dysthymic disorder 09/13/2010     Priority: Medium    Anxiety state 06/02/2009     Priority: Medium     Problem list name updated by automated process. Provider to review      Depressive disorder, not elsewhere classified 10/31/2007     Priority: Medium    Migraine 01/15/2003     Priority: Medium     Problem list name updated by automated process. 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    Inguinal hernia without mention of obstruction or gangrene, unilateral or unspecified, (not specified as recurrent) 06/16/2008    Irregular menstrual cycle 05/25/2011    Mental disorders of mother, postpartum 10/06/2005    Migraine, unspecified, without mention of intractable migraine without mention of status migrainosus 01/15/2003    Moderate dysplasia of cervix 11/03/2011    Pain in thoracic spine 11/03/2011    Scoliosis (and kyphoscoliosis), idiopathic 05/16/2011       Past Surgical History:    Past Surgical History:   Procedure Laterality Date    HEAD & NECK SURGERY      inguinal hernia repair, Germain repair  10/14/2008    RT     TUBAL LIGATION         Family History:    Family History   Problem Relation Age of Onset    Cancer Mother          lymphoma    Cancer Other     Cancer Other     Colon Cancer Sister        Social History:  Marital Status:   [2]  Social History     Tobacco Use    Smoking status: Some Days     Current packs/day: 0.00     Types: Cigarettes     Last attempt to quit: 2015     Years since quittin.5    Smokeless tobacco: Never    Tobacco comments:     Passive Exposure: Yes    Substance Use Topics    Alcohol use: No    Drug use: No        Medications:    acetaminophen-codeine (TYLENOL/CODEINE #3) 300-30 MG per tablet  baclofen (LIORESAL) 10 MG tablet  clonazePAM (KLONOPIN) 0.5 MG tablet  clorazepate (TRANXENE) 3.75 MG tablet  gabapentin (NEURONTIN) 300 MG capsule  hydrOXYzine (ATARAX) 25 MG tablet  hydrOXYzine (VISTARIL) 25 MG capsule  IBUPROFEN PO  Multiple Vitamins-Minerals (MULTIVITAMIN ADULT PO)  propranolol ER (INDERAL LA) 60 MG 24 hr capsule  SUMAtriptan (IMITREX) 100 MG tablet  TiZANidine HCl (ZANAFLEX PO)  vitamin C (ASCORBIC ACID) 500 MG tablet          Review of Systems   Constitutional:  Negative for activity change, appetite change, chills and fever.   HENT:  Negative for congestion and sore throat.    Respiratory:  Positive for cough. Negative for shortness of breath.    Gastrointestinal:  Negative for diarrhea, nausea and vomiting.   Musculoskeletal:  Positive for myalgias.   All other systems reviewed and are negative.      Physical Exam   BP: 121/82  Pulse: 91  Temp: 99  F (37.2  C)  Resp: 20  SpO2: 99 %      Physical Exam  Vitals and nursing note reviewed.   Constitutional:       General: She is not in acute distress.     Appearance: Normal appearance. She is not ill-appearing, toxic-appearing or diaphoretic.   HENT:      Right Ear: Tympanic membrane is not erythematous.      Left Ear: Tympanic membrane is not erythematous.      Mouth/Throat:      Mouth: Mucous membranes are moist.      Pharynx: Oropharynx is clear. Uvula midline. No oropharyngeal exudate or posterior oropharyngeal erythema.    Cardiovascular:      Rate and Rhythm: Normal rate and regular rhythm.      Heart sounds: Normal heart sounds. No murmur heard.  Pulmonary:      Effort: Pulmonary effort is normal.      Breath sounds: Normal breath sounds. No wheezing, rhonchi or rales.   Neurological:      Mental Status: She is alert.         ED Course        Procedures       No results found for this or any previous visit (from the past 24 hours).    Medications - No data to display    Assessments & Plan (with Medical Decision Making)     I have reviewed the nursing notes.    I have reviewed the findings, diagnosis, plan and need for follow up with the patient.  Venu Amaya is a 49 year old female who presents to the urgent care with a 2 day history of cough and body aches. States she has been exposed to covid and influenza. She denies fevers, chills, and n/v/d. Previous smoker. No recent abx or OTC medications.     MDM: vital signs normal, afebrile. Non toxic in appearance with no noted distress. Lungs clear, heart tones regular. Able to speak in complete sentences without dyspnea. 2 day history of symptom onset. Exposed to covid and influenza. Most likely vital in etiology. Multilex pending. Supportive measures and return precautions discussed. She is in agreement with plan.    (B34.9) Viral illness, (Z20.822) Contact with and (suspected) exposure to covid-19, (Z20.828) Exposure to influenza  Plan: Push fluids and rest.   Tylenol and ibuprofen as directed if needed.   Follow up in the clinic for a recheck.   Return with any new or concerning symptoms. Understanding verbalized.       Discharge Medication List as of 1/10/2025 10:07 AM          Final diagnoses:   Viral illness   Contact with and (suspected) exposure to covid-19   Exposure to influenza       1/10/2025   [facility]       Stefany Sanchez NP  01/10/25 1010

## 2025-01-10 NOTE — ED TRIAGE NOTES
Pt presents with c/o having increased cough and body aches   Reports has been around sick people at home   S/x started 2 days ago   No otc meds taken today

## 2025-01-10 NOTE — DISCHARGE INSTRUCTIONS
Push fluids and rest.   Tylenol and ibuprofen as directed if needed.   Follow up in the clinic for a recheck.   Return with any new or concerning symptoms.

## 2025-01-11 ENCOUNTER — HOSPITAL ENCOUNTER (EMERGENCY)
Facility: HOSPITAL | Age: 50
Discharge: HOME | End: 2025-01-11
Attending: INTERNAL MEDICINE
Payer: COMMERCIAL

## 2025-01-11 VITALS
SYSTOLIC BLOOD PRESSURE: 106 MMHG | RESPIRATION RATE: 18 BRPM | TEMPERATURE: 99.4 F | BODY MASS INDEX: 17.98 KG/M2 | WEIGHT: 108.03 LBS | DIASTOLIC BLOOD PRESSURE: 73 MMHG | OXYGEN SATURATION: 97 % | HEART RATE: 87 BPM

## 2025-01-11 DIAGNOSIS — G43.809 OTHER MIGRAINE WITHOUT STATUS MIGRAINOSUS, NOT INTRACTABLE: ICD-10-CM

## 2025-01-11 PROCEDURE — 96372 THER/PROPH/DIAG INJ SC/IM: CPT | Performed by: INTERNAL MEDICINE

## 2025-01-11 PROCEDURE — 250N000012 HC RX MED GY IP 250 OP 636 PS 637: Performed by: INTERNAL MEDICINE

## 2025-01-11 PROCEDURE — 250N000011 HC RX IP 250 OP 636: Performed by: INTERNAL MEDICINE

## 2025-01-11 RX ORDER — DIPHENHYDRAMINE HYDROCHLORIDE 50 MG/ML
50 INJECTION INTRAMUSCULAR; INTRAVENOUS ONCE
Status: COMPLETED | OUTPATIENT
Start: 2025-01-11 | End: 2025-01-11

## 2025-01-11 RX ORDER — SUMATRIPTAN 6 MG/.5ML
6 INJECTION, SOLUTION SUBCUTANEOUS ONCE
Status: COMPLETED | OUTPATIENT
Start: 2025-01-11 | End: 2025-01-11

## 2025-01-11 RX ADMIN — SUMATRIPTAN SUCCINATE 6 MG: 6 INJECTION SUBCUTANEOUS at 01:35

## 2025-01-11 RX ADMIN — DIPHENHYDRAMINE HYDROCHLORIDE 50 MG: 50 INJECTION, SOLUTION INTRAMUSCULAR; INTRAVENOUS at 00:17

## 2025-01-11 RX ADMIN — PROCHLORPERAZINE EDISYLATE 10 MG: 5 INJECTION INTRAMUSCULAR; INTRAVENOUS at 00:17

## 2025-01-11 ASSESSMENT — ACTIVITIES OF DAILY LIVING (ADL)
ADLS_ACUITY_SCORE: 41
ADLS_ACUITY_SCORE: 41

## 2025-01-11 NOTE — ED TRIAGE NOTES
"Patient self-presents with c/o migraine. States this morning she took one half of an imitrex and it did not help, so she took the other half at 1700 - also half of a tylenol 4 at \"2030 or maybe 2230\". States that she has migraines/headaches daily, but states this one is worse because she feels her pulse racing.      Triage Assessment (Adult)       Row Name 01/10/25 6524          Triage Assessment    Airway WDL WDL        Respiratory WDL    Respiratory WDL WDL        Cognitive/Neuro/Behavioral WDL    Cognitive/Neuro/Behavioral WDL WDL                     "

## 2025-01-11 NOTE — ED NOTES
Patient is alert and able to answer questions appropriately, but is delayed with answers and is keeping her eyes closed. States she has mild nausea and SOB with the racing pulse and migraine.

## 2025-01-13 ASSESSMENT — ENCOUNTER SYMPTOMS
NAUSEA: 1
COUGH: 0
SHORTNESS OF BREATH: 0
HEADACHES: 1
FEVER: 0
ABDOMINAL PAIN: 0

## 2025-01-13 NOTE — ED PROVIDER NOTES
History     Chief Complaint   Patient presents with    Headache     The history is provided by the patient.   Headache  Pain location:  L parietal  Radiates to:  Does not radiate  Severity currently:  8/10  Severity at highest:  8/10  Onset quality:  Gradual  Duration:  1 day  Timing:  Constant  Progression:  Worsening  Chronicity:  Recurrent  Associated symptoms: nausea    Associated symptoms: no abdominal pain, no cough and no fever          Allergies:  Allergies   Allergen Reactions    Amoxicillin Swelling     Hands swell     Levofloxacin Other (See Comments)     Unknown reaction - Levaquin     Other [No Clinical Screening - See Comments]      utapar drop in blood pressure    Pcn [Penicillins]     Ritodrine        Problem List:    Patient Active Problem List    Diagnosis Date Noted    S/P cervical spinal fusion 03/29/2018     Priority: Medium    Neural foraminal stenosis of cervical spine 10/05/2017     Priority: Medium    Opioid dependence (H) 12/23/2015     Priority: Medium    Long term (current) use of opiate analgesic 12/23/2015     Priority: Medium    Chronic low back pain 11/02/2015     Priority: Medium     Overview:   IMO Update      Headache 10/23/2014     Priority: Medium     Overview:   10/23/14: According to neurology consultation of 9/2/14, previous treatments  include pretty much all of the triptans, Maxalt, Imitrex, Amerge, Axert, Frova, and Zomig. She reports that she has tried these when she did not have a constant headache and they did not work when she took them at first onset nor do they work now. She recently tried Imitrex injections and has tried a nasal spray in the past without improvement. The patient does take Compazine for the nausea and reports that that helps somewhat. She has tried numerous preventative medications as well including Topamax, Depakote, gabapentin, Tegretol, Dilantin, nortriptyline, propranolol, and verapamil per her report. She has tried physical therapy as well as  trigger injections and denies that any of these things are helpful. She does report she takes ibuprofen 800 mg twice per day for ankylosing spondylitis. For her headaches, the only thing she has found that helped is Tylenol with Codeine.       Cervicalgia 05/15/2014     Priority: Medium    Myalgia and myositis 05/15/2014     Priority: Medium    Abnormal pap 07/30/2013     Priority: Medium    Ankylosing spondylitis (H) 05/24/2012     Priority: Medium     Flori AN       Dysthymic disorder 09/13/2010     Priority: Medium    Anxiety state 06/02/2009     Priority: Medium     Problem list name updated by automated process. Provider to review      Depressive disorder, not elsewhere classified 10/31/2007     Priority: Medium    Migraine 01/15/2003     Priority: Medium     Problem list name updated by automated process. 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    Inguinal hernia without mention of obstruction or gangrene, unilateral or unspecified, (not specified as recurrent) 06/16/2008    Irregular menstrual cycle 05/25/2011    Mental disorders of mother, postpartum 10/06/2005    Migraine, unspecified, without mention of intractable migraine without mention of status migrainosus 01/15/2003    Moderate dysplasia of cervix 11/03/2011    Pain in thoracic spine 11/03/2011    Scoliosis (and kyphoscoliosis), idiopathic 05/16/2011       Past Surgical History:    Past Surgical History:   Procedure Laterality Date    HEAD & NECK SURGERY      inguinal hernia repair, Germain repair  10/14/2008     RT     TUBAL LIGATION         Family History:    Family History   Problem Relation Age of Onset    Cancer Mother         lymphoma    Cancer Other     Cancer Other     Colon Cancer Sister        Social History:  Marital Status:   [2]  Social History     Tobacco Use    Smoking status: Some Days     Current packs/day: 0.00     Types: Cigarettes     Last attempt to quit: 2015     Years since quittin.5    Smokeless tobacco: Never    Tobacco comments:     Passive Exposure: Yes    Substance Use Topics    Alcohol use: No    Drug use: No        Medications:    hydrOXYzine (ATARAX) 25 MG tablet  SUMAtriptan (IMITREX) 100 MG tablet  acetaminophen-codeine (TYLENOL/CODEINE #3) 300-30 MG per tablet  baclofen (LIORESAL) 10 MG tablet  clonazePAM (KLONOPIN) 0.5 MG tablet  clorazepate (TRANXENE) 3.75 MG tablet  gabapentin (NEURONTIN) 300 MG capsule  hydrOXYzine (VISTARIL) 25 MG capsule  IBUPROFEN PO  Multiple Vitamins-Minerals (MULTIVITAMIN ADULT PO)  propranolol ER (INDERAL LA) 60 MG 24 hr capsule  TiZANidine HCl (ZANAFLEX PO)  vitamin C (ASCORBIC ACID) 500 MG tablet          Review of Systems   Constitutional:  Negative for fever.   Respiratory:  Negative for cough and shortness of breath.    Cardiovascular:  Negative for chest pain.   Gastrointestinal:  Positive for nausea. Negative for abdominal pain.   Skin:  Negative for rash.   Neurological:  Positive for headaches.   All other systems reviewed and are negative.      Physical Exam   BP: 125/86  Pulse: 101  Temp: 98.6  F (37  C)  Resp: 18  Weight: 49 kg (108 lb 0.4 oz)  SpO2: 99 %      Physical Exam  Constitutional:       General: She is not in acute distress.     Appearance: Normal appearance. She is not diaphoretic.   HENT:      Head: Atraumatic.      Mouth/Throat:      Mouth: Mucous membranes are moist.   Eyes:      General: No scleral icterus.     Conjunctiva/sclera: Conjunctivae normal.   Cardiovascular:      Rate and Rhythm: Normal rate.      Heart  sounds: Normal heart sounds.   Pulmonary:      Effort: No respiratory distress.      Breath sounds: Normal breath sounds.   Abdominal:      General: Abdomen is flat.   Musculoskeletal:      Cervical back: Neck supple.   Skin:     General: Skin is warm.      Findings: No rash.   Neurological:      Mental Status: She is alert.         ED Course        Procedures                  No results found for this or any previous visit (from the past 24 hours).    Medications   prochlorperazine (COMPAZINE) injection 10 mg (10 mg Intramuscular $Given 1/11/25 0017)   diphenhydrAMINE (BENADRYL) injection 50 mg (50 mg Intramuscular $Given 1/11/25 0017)   SUMAtriptan (IMITREX) injection 6 mg (6 mg Subcutaneous $Given 1/11/25 0135)       Assessments & Plan (with Medical Decision Making)   Migraine headache  Partially responded to compazine but after receiving imitrex , her pain resolved  D C Home  I have reviewed the nursing notes.    I have reviewed the findings, diagnosis, plan and need for follow up with the patient.      Discharge Medication List as of 1/11/2025  2:25 AM          Final diagnoses:   Other migraine without status migrainosus, not intractable       1/10/2025   [facility]       Abdoulaye Garcia MD  01/13/25 8701     Anesthesia Volume In Cc: 9

## 2025-02-18 ENCOUNTER — HOSPITAL ENCOUNTER (EMERGENCY)
Facility: HOSPITAL | Age: 50
Discharge: HOME | End: 2025-02-18
Attending: STUDENT IN AN ORGANIZED HEALTH CARE EDUCATION/TRAINING PROGRAM
Payer: COMMERCIAL

## 2025-02-18 VITALS
OXYGEN SATURATION: 99 % | HEART RATE: 71 BPM | RESPIRATION RATE: 18 BRPM | SYSTOLIC BLOOD PRESSURE: 124 MMHG | DIASTOLIC BLOOD PRESSURE: 86 MMHG | TEMPERATURE: 99 F

## 2025-02-18 DIAGNOSIS — R00.0 TACHYCARDIA: ICD-10-CM

## 2025-02-18 LAB
ANION GAP SERPL CALCULATED.3IONS-SCNC: 9 MMOL/L (ref 7–15)
BASOPHILS # BLD AUTO: 0 10E3/UL (ref 0–0.2)
BASOPHILS NFR BLD AUTO: 1 %
BUN SERPL-MCNC: 18.6 MG/DL (ref 6–20)
CALCIUM SERPL-MCNC: 10 MG/DL (ref 8.8–10.4)
CHLORIDE SERPL-SCNC: 106 MMOL/L (ref 98–107)
CREAT SERPL-MCNC: 0.58 MG/DL (ref 0.51–0.95)
EGFRCR SERPLBLD CKD-EPI 2021: >90 ML/MIN/1.73M2
EOSINOPHIL # BLD AUTO: 0.1 10E3/UL (ref 0–0.7)
EOSINOPHIL NFR BLD AUTO: 2 %
ERYTHROCYTE [DISTWIDTH] IN BLOOD BY AUTOMATED COUNT: 12.5 % (ref 10–15)
GLUCOSE BLDC GLUCOMTR-MCNC: 88 MG/DL (ref 70–99)
GLUCOSE SERPL-MCNC: 102 MG/DL (ref 70–99)
HCO3 SERPL-SCNC: 26 MMOL/L (ref 22–29)
HCT VFR BLD AUTO: 44.6 % (ref 35–47)
HGB BLD-MCNC: 14.8 G/DL (ref 11.7–15.7)
HOLD SPECIMEN: NORMAL
IMM GRANULOCYTES # BLD: 0 10E3/UL
IMM GRANULOCYTES NFR BLD: 0 %
LYMPHOCYTES # BLD AUTO: 1.1 10E3/UL (ref 0.8–5.3)
LYMPHOCYTES NFR BLD AUTO: 26 %
MAGNESIUM SERPL-MCNC: 2.1 MG/DL (ref 1.7–2.3)
MCH RBC QN AUTO: 30.3 PG (ref 26.5–33)
MCHC RBC AUTO-ENTMCNC: 33.2 G/DL (ref 31.5–36.5)
MCV RBC AUTO: 91 FL (ref 78–100)
MONOCYTES # BLD AUTO: 0.2 10E3/UL (ref 0–1.3)
MONOCYTES NFR BLD AUTO: 5 %
NEUTROPHILS # BLD AUTO: 2.8 10E3/UL (ref 1.6–8.3)
NEUTROPHILS NFR BLD AUTO: 67 %
NRBC # BLD AUTO: 0 10E3/UL
NRBC BLD AUTO-RTO: 0 /100
PLATELET # BLD AUTO: 153 10E3/UL (ref 150–450)
POTASSIUM SERPL-SCNC: 4.2 MMOL/L (ref 3.4–5.3)
RBC # BLD AUTO: 4.89 10E6/UL (ref 3.8–5.2)
SODIUM SERPL-SCNC: 141 MMOL/L (ref 135–145)
TROPONIN T SERPL HS-MCNC: <6 NG/L
TSH SERPL DL<=0.005 MIU/L-ACNC: 1.47 UIU/ML (ref 0.3–4.2)
WBC # BLD AUTO: 4.2 10E3/UL (ref 4–11)

## 2025-02-18 PROCEDURE — 93005 ELECTROCARDIOGRAM TRACING: CPT

## 2025-02-18 PROCEDURE — 80048 BASIC METABOLIC PNL TOTAL CA: CPT | Performed by: STUDENT IN AN ORGANIZED HEALTH CARE EDUCATION/TRAINING PROGRAM

## 2025-02-18 PROCEDURE — 99284 EMERGENCY DEPT VISIT MOD MDM: CPT | Performed by: STUDENT IN AN ORGANIZED HEALTH CARE EDUCATION/TRAINING PROGRAM

## 2025-02-18 PROCEDURE — 36415 COLL VENOUS BLD VENIPUNCTURE: CPT | Performed by: STUDENT IN AN ORGANIZED HEALTH CARE EDUCATION/TRAINING PROGRAM

## 2025-02-18 PROCEDURE — 84484 ASSAY OF TROPONIN QUANT: CPT | Performed by: STUDENT IN AN ORGANIZED HEALTH CARE EDUCATION/TRAINING PROGRAM

## 2025-02-18 PROCEDURE — 93010 ELECTROCARDIOGRAM REPORT: CPT | Performed by: INTERNAL MEDICINE

## 2025-02-18 PROCEDURE — 99284 EMERGENCY DEPT VISIT MOD MDM: CPT

## 2025-02-18 PROCEDURE — 82962 GLUCOSE BLOOD TEST: CPT

## 2025-02-18 PROCEDURE — 83735 ASSAY OF MAGNESIUM: CPT | Performed by: STUDENT IN AN ORGANIZED HEALTH CARE EDUCATION/TRAINING PROGRAM

## 2025-02-18 PROCEDURE — 85004 AUTOMATED DIFF WBC COUNT: CPT | Performed by: STUDENT IN AN ORGANIZED HEALTH CARE EDUCATION/TRAINING PROGRAM

## 2025-02-18 PROCEDURE — 84443 ASSAY THYROID STIM HORMONE: CPT | Performed by: STUDENT IN AN ORGANIZED HEALTH CARE EDUCATION/TRAINING PROGRAM

## 2025-02-18 ASSESSMENT — ACTIVITIES OF DAILY LIVING (ADL)
ADLS_ACUITY_SCORE: 41
ADLS_ACUITY_SCORE: 41

## 2025-02-18 NOTE — DISCHARGE INSTRUCTIONS
Return to the emergency department for worsening symptoms or new concerning symptoms.  Follow-up with your primary care provider to discuss your visit here today.  I have ordered the Zio patch.

## 2025-02-18 NOTE — ED PROVIDER NOTES
[facility]  ED Provider Note    Chief Complaint   Patient presents with    Nasal Congestion    Tachycardia     History:  Venu Amaya is a 49 year old female with anxiety, ankylosing spondylitis, opioid dependence, migraines who presents to the emergency department today complaining of tachycardia this morning.  She woke up and felt her heart going fast and made her feel anxious so she checked her pulse she had the highest pulse rate of 140 and sought climb.  She states it was not bouncing up and down on her pulse oximeter but rather steadily climbed.  She took a hydroxyzine and within about half an hour it steadily declined.  She feels fine now.  In the moment she felt a small bit of shooting chest pain that resolved all on its own.  No abdominal pain.  No complaints of vomiting.  She did have a little lightheadedness.    Review of Systems   Performed; see HPI for pertinent positives and negatives.     Medical history, surgical history, and social history was reviewed.  Nursing documentation, triage note, and vitals were reviewed.    Vitals:  BP: 133/90  Pulse: 93  Temp: 98.9  F (37.2  C)  Resp: 16  SpO2: 99 %    Physical Exam:  Constitutional: Alert and conversant. NAD   HENT: NCAT   Eyes: Normal pupils   Neck: supple   CV: No pallor, regular rate and rhythm  Pulmonary/Chest: Non-labored respirations, clear to auscultation bilaterally  Abdominal: non-distended   MSK: MORALES.   Neuro: Alert and appropriate   Skin: Warm and dry. No diaphoresis. No rashes on exposed skin    Psych: Appropriate mood and affect     MDM:      ED Course as of 02/18/25 0935   Tue Feb 18, 2025   0824 49-year-old female here in the emergency department felt her heart rate going high checked and it was about 140   0922 With chest pain reasonable to check trops, which are negative and according to the [place] protocol undetectable troponin is an ACS rule out..  No laterality of symptoms to suggest stroke.  No focal  neurological deficits at this time.  In fact she is quite asymptomatic which is reassuring.  We did check her glucose and it was not low which is reassuring.  She was monitored on telemetry without any abnormalities her TSH is normal mag also normal.  Sodium potassium and calcium are all at acceptable levels.  No anemia to explain her symptoms.  EKG without signs of acute ischemia or arrhythmia.  Overall reassuring evaluation  No concerning findings on telemetry and no repeat events while here.  Vitals within normal limits   0934 At this point there is no further required emergency department evaluation.  I have given her a Zio patch so she can monitor cardiac activity and if there is a repeat event she can figure out what it was.  My overall suspicion at this time for atrial fibrillation is low.  The way she describes it not bouncing all over the place is reassuring.  I do not think I will start her on anticoagulation until we actually prove that she has A-fib       Procedures:  Procedures        Impression:  Final diagnoses:   Tachycardia            Robbie Alcantara MD  02/18/25 0935

## 2025-02-18 NOTE — ED TRIAGE NOTES
Patient presents with c/o not feeling well for a few days, reports that she woke up this AM with a heart rate of 140 by checking pulse. Patient reports feeling anxious now. Reports congestion and light headedness, reports that she is feeling better than she was and had this increase in heart rate this AM. Reports taking a hydroxyzine which helped but was still having pulse is the 100's. Denies any chest pain currently. Had a minute of chest pain earlier and reports pain between shoulder blades but attributes this to her bad neck.

## 2025-02-19 LAB
ATRIAL RATE - MUSE: 85 BPM
DIASTOLIC BLOOD PRESSURE - MUSE: NORMAL MMHG
INTERPRETATION ECG - MUSE: NORMAL
P AXIS - MUSE: 75 DEGREES
PR INTERVAL - MUSE: 182 MS
QRS DURATION - MUSE: 90 MS
QT - MUSE: 362 MS
QTC - MUSE: 430 MS
R AXIS - MUSE: 30 DEGREES
SYSTOLIC BLOOD PRESSURE - MUSE: NORMAL MMHG
T AXIS - MUSE: 78 DEGREES
VENTRICULAR RATE- MUSE: 85 BPM

## 2025-03-26 ENCOUNTER — HOSPITAL ENCOUNTER (EMERGENCY)
Facility: HOSPITAL | Age: 50
Discharge: HOME | End: 2025-03-26
Attending: STUDENT IN AN ORGANIZED HEALTH CARE EDUCATION/TRAINING PROGRAM
Payer: COMMERCIAL

## 2025-03-26 VITALS
OXYGEN SATURATION: 99 % | TEMPERATURE: 97.6 F | RESPIRATION RATE: 10 BRPM | SYSTOLIC BLOOD PRESSURE: 113 MMHG | DIASTOLIC BLOOD PRESSURE: 80 MMHG | HEART RATE: 64 BPM

## 2025-03-26 DIAGNOSIS — G43.909 MIGRAINE WITHOUT STATUS MIGRAINOSUS, NOT INTRACTABLE, UNSPECIFIED MIGRAINE TYPE: ICD-10-CM

## 2025-03-26 LAB
ATRIAL RATE - MUSE: 71 BPM
DIASTOLIC BLOOD PRESSURE - MUSE: NORMAL MMHG
HOLD SPECIMEN: NORMAL
INTERPRETATION ECG - MUSE: NORMAL
P AXIS - MUSE: 79 DEGREES
PR INTERVAL - MUSE: 194 MS
QRS DURATION - MUSE: 92 MS
QT - MUSE: 410 MS
QTC - MUSE: 445 MS
R AXIS - MUSE: 41 DEGREES
SYSTOLIC BLOOD PRESSURE - MUSE: NORMAL MMHG
T AXIS - MUSE: 86 DEGREES
VENTRICULAR RATE- MUSE: 71 BPM

## 2025-03-26 PROCEDURE — 96361 HYDRATE IV INFUSION ADD-ON: CPT | Performed by: STUDENT IN AN ORGANIZED HEALTH CARE EDUCATION/TRAINING PROGRAM

## 2025-03-26 PROCEDURE — 99284 EMERGENCY DEPT VISIT MOD MDM: CPT | Performed by: STUDENT IN AN ORGANIZED HEALTH CARE EDUCATION/TRAINING PROGRAM

## 2025-03-26 PROCEDURE — 250N000009 HC RX 250: Performed by: STUDENT IN AN ORGANIZED HEALTH CARE EDUCATION/TRAINING PROGRAM

## 2025-03-26 PROCEDURE — 96374 THER/PROPH/DIAG INJ IV PUSH: CPT | Performed by: STUDENT IN AN ORGANIZED HEALTH CARE EDUCATION/TRAINING PROGRAM

## 2025-03-26 PROCEDURE — 258N000003 HC RX IP 258 OP 636: Performed by: STUDENT IN AN ORGANIZED HEALTH CARE EDUCATION/TRAINING PROGRAM

## 2025-03-26 PROCEDURE — 93010 ELECTROCARDIOGRAM REPORT: CPT | Performed by: INTERNAL MEDICINE

## 2025-03-26 PROCEDURE — 250N000011 HC RX IP 250 OP 636: Performed by: STUDENT IN AN ORGANIZED HEALTH CARE EDUCATION/TRAINING PROGRAM

## 2025-03-26 PROCEDURE — 93005 ELECTROCARDIOGRAM TRACING: CPT | Performed by: STUDENT IN AN ORGANIZED HEALTH CARE EDUCATION/TRAINING PROGRAM

## 2025-03-26 PROCEDURE — 99284 EMERGENCY DEPT VISIT MOD MDM: CPT | Mod: 25 | Performed by: STUDENT IN AN ORGANIZED HEALTH CARE EDUCATION/TRAINING PROGRAM

## 2025-03-26 RX ORDER — DROPERIDOL 2.5 MG/ML
2.5 INJECTION, SOLUTION INTRAMUSCULAR; INTRAVENOUS ONCE
Status: COMPLETED | OUTPATIENT
Start: 2025-03-26 | End: 2025-03-26

## 2025-03-26 RX ORDER — DEXAMETHASONE SODIUM PHOSPHATE 10 MG/ML
10 INJECTION INTRAMUSCULAR; INTRAVENOUS ONCE
Status: COMPLETED | OUTPATIENT
Start: 2025-03-26 | End: 2025-03-26

## 2025-03-26 RX ADMIN — DROPERIDOL 2.5 MG: 2.5 INJECTION, SOLUTION INTRAMUSCULAR; INTRAVENOUS at 15:36

## 2025-03-26 RX ADMIN — SODIUM CHLORIDE 1000 ML: 9 INJECTION, SOLUTION INTRAVENOUS at 15:35

## 2025-03-26 RX ADMIN — DEXAMETHASONE SODIUM PHOSPHATE 10 MG: 10 INJECTION INTRAMUSCULAR; INTRAVENOUS at 15:42

## 2025-03-26 ASSESSMENT — COLUMBIA-SUICIDE SEVERITY RATING SCALE - C-SSRS
6. HAVE YOU EVER DONE ANYTHING, STARTED TO DO ANYTHING, OR PREPARED TO DO ANYTHING TO END YOUR LIFE?: NO
1. IN THE PAST MONTH, HAVE YOU WISHED YOU WERE DEAD OR WISHED YOU COULD GO TO SLEEP AND NOT WAKE UP?: NO
2. HAVE YOU ACTUALLY HAD ANY THOUGHTS OF KILLING YOURSELF IN THE PAST MONTH?: NO

## 2025-03-26 ASSESSMENT — ACTIVITIES OF DAILY LIVING (ADL)
ADLS_ACUITY_SCORE: 41

## 2025-03-26 NOTE — DISCHARGE INSTRUCTIONS
Return to the emergency department for worsening symptoms or new concerning symptoms.  Take some ibuprofen and Tylenol when you get home as we discussed.  Return to the emergency department for worsening symptoms or new concerning symptoms.  You can continue to use your Imitrex as directed.  Follow-up with your primary care provider in the next week.  Call schedule appointment.

## 2025-03-26 NOTE — ED TRIAGE NOTES
"Patient presents with complaints of a migraine. Does have a hx of them and took the 1/2 Imitrex last night as well as today but states it's still really bad, \"I even took an Excedrin\" \"Sometimes I get them like this with my period and it's about over\"     Triage Assessment (Adult)       Row Name 03/26/25 0499          Triage Assessment    Airway WDL WDL        Respiratory WDL    Respiratory WDL WDL        Cognitive/Neuro/Behavioral WDL    Cognitive/Neuro/Behavioral WDL WDL                     "

## 2025-03-26 NOTE — ED PROVIDER NOTES
[facility]  ED Provider Note    Chief Complaint   Patient presents with    Headache     History:  Venu Amaya is a 49 year old female with history of migraines presents to the emergency department today complaining of migraine headache.  She states that it probably started yesterday but she notes that her migraines tend to be chronic and so estimating a specific time of onset can be a bit challenging.  She tried taking half an Imitrex in the morning yesterday and then another 1 at 11 and her headache persisted.  She came in because it was getting bad she has nausea no vomiting.  No fevers no head trauma.  No other complaint    Review of Systems   Performed; see HPI for pertinent positives and negatives.     Medical history, surgical history, and social history was reviewed.  Nursing documentation, triage note, and vitals were reviewed.    Vitals:  BP: (!) 144/81  Pulse: 89  Temp: 97.6  F (36.4  C)  Resp: 18  SpO2: 99 %    Physical Exam:  Constitutional: Alert and conversant. NAD   HENT: NCAT   Eyes: Normal pupils   Neck: supple   CV: No pallor  Pulmonary/Chest: Non-labored respirations  Abdominal: non-distended   MSK: MORALES.   Neuro: Alert and appropriate, CN 2-12 intact, Strength 5/5 and symmetric in the upper and lower extremities, SILT, normal gait, normal rapid alternating movements  Skin: Warm and dry. No diaphoresis. No rashes on exposed skin    Psych: Appropriate mood and affect       MDM:      ED Course as of 03/26/25 1704   Wed Mar 26, 2025   1704 Venu Amaya is a 49 year old female presenting with headache.     Vitals reassuring   Exam non focal neuro, reassuring     Differential includes but is not limited to SAH/ICH, Trauma/fx, venous sinus thrombosis, pseudotumor cerebri, meningitis/encephalitis/intracranial abscess, post LP headache, Giant cell arteritis, mass effect, CO toxicity, migrane, tension HA, cluster HA, hypoglycemia, dehydration.      Given the patient's history  of similar headaches without signs or symptoms suggestive of concerning intracranial pathology, and benign neurologic exam, the etiology of this patient's headache is likely a primary headache disorder and does not require further emergent diagnostic evaluation. This was treated in the emergency department with droperidol Decadron and fluid with subsequent improvement in symptoms. The patient was discharged with recommendations to follow up with a primary care provider. Patient given instructions on follow-up and warning signs for which to return to ED. All questions were answered and the patient is comfortable with plan for discharge. The patient was discharged in stable condition with follow up recommended in the next week.         Procedures:  Procedures        Impression:  Final diagnoses:   Migraine without status migrainosus, not intractable, unspecified migraine type            Robbie Alcantara MD  03/26/25 0469

## 2025-03-31 LAB
ATRIAL RATE - MUSE: 71 BPM
DIASTOLIC BLOOD PRESSURE - MUSE: NORMAL MMHG
INTERPRETATION ECG - MUSE: NORMAL
P AXIS - MUSE: 79 DEGREES
PR INTERVAL - MUSE: 194 MS
QRS DURATION - MUSE: 92 MS
QT - MUSE: 410 MS
QTC - MUSE: 445 MS
R AXIS - MUSE: 41 DEGREES
SYSTOLIC BLOOD PRESSURE - MUSE: NORMAL MMHG
T AXIS - MUSE: 86 DEGREES
VENTRICULAR RATE- MUSE: 71 BPM

## 2025-05-20 ENCOUNTER — HOSPITAL ENCOUNTER (EMERGENCY)
Facility: HOSPITAL | Age: 50
Discharge: HOME | End: 2025-05-20
Attending: NURSE PRACTITIONER
Payer: COMMERCIAL

## 2025-05-20 VITALS
OXYGEN SATURATION: 93 % | DIASTOLIC BLOOD PRESSURE: 84 MMHG | HEIGHT: 65 IN | RESPIRATION RATE: 18 BRPM | SYSTOLIC BLOOD PRESSURE: 126 MMHG | BODY MASS INDEX: 17.58 KG/M2 | WEIGHT: 105.5 LBS | TEMPERATURE: 98.1 F | HEART RATE: 97 BPM

## 2025-05-20 DIAGNOSIS — R59.0 INGUINAL LYMPHADENOPATHY: ICD-10-CM

## 2025-05-20 PROCEDURE — 99283 EMERGENCY DEPT VISIT LOW MDM: CPT | Performed by: NURSE PRACTITIONER

## 2025-05-20 PROCEDURE — 99282 EMERGENCY DEPT VISIT SF MDM: CPT

## 2025-05-20 ASSESSMENT — ENCOUNTER SYMPTOMS
GASTROINTESTINAL NEGATIVE: 1
EYES NEGATIVE: 1
NEUROLOGICAL NEGATIVE: 1
CARDIOVASCULAR NEGATIVE: 1
ALLERGIC/IMMUNOLOGIC NEGATIVE: 1
MUSCULOSKELETAL NEGATIVE: 1
PSYCHIATRIC NEGATIVE: 1
RESPIRATORY NEGATIVE: 1
CONSTITUTIONAL NEGATIVE: 1
ENDOCRINE NEGATIVE: 1
HEMATOLOGIC/LYMPHATIC NEGATIVE: 1

## 2025-05-20 ASSESSMENT — COLUMBIA-SUICIDE SEVERITY RATING SCALE - C-SSRS
2. HAVE YOU ACTUALLY HAD ANY THOUGHTS OF KILLING YOURSELF IN THE PAST MONTH?: NO
1. IN THE PAST MONTH, HAVE YOU WISHED YOU WERE DEAD OR WISHED YOU COULD GO TO SLEEP AND NOT WAKE UP?: NO
6. HAVE YOU EVER DONE ANYTHING, STARTED TO DO ANYTHING, OR PREPARED TO DO ANYTHING TO END YOUR LIFE?: NO

## 2025-05-20 ASSESSMENT — ACTIVITIES OF DAILY LIVING (ADL): ADLS_ACUITY_SCORE: 41

## 2025-05-21 NOTE — ED PROVIDER NOTES
History     Chief Complaint   Patient presents with    Mass     HPI  Venu Amaya is a 49 year old individual with history of anxiety, depressive disorder, ankylosing spondylitis, dysthymic disorder, chronic low back pain on chronic opioids, comes in for mass to the left groin.  Patient states that she noticed a mass to the left groin about a week and a half ago.  States that it is becoming more noticeable.  States that she notices it more when standing up and it improves when laying down.  Comes in for evaluation.  No fever or chills.  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    Allergies:  Allergies   Allergen Reactions    Amoxicillin Swelling     Hands swell     Levofloxacin Other (See Comments)     Unknown reaction - Levaquin     Other [No Clinical Screening - See Comments]      utapar drop in blood pressure    Pcn [Penicillins]     Ritodrine        Problem List:    Patient Active Problem List    Diagnosis Date Noted    S/P cervical spinal fusion 03/29/2018     Priority: Medium    Neural foraminal stenosis of cervical spine 10/05/2017     Priority: Medium    Opioid dependence (H) 12/23/2015     Priority: Medium    Long term (current) use of opiate analgesic 12/23/2015     Priority: Medium    Chronic low back pain 11/02/2015     Priority: Medium     Overview:   IMO Update      Headache 10/23/2014     Priority: Medium     Overview:   10/23/14: According to neurology consultation of 9/2/14, previous treatments  include pretty much all of the triptans, Maxalt, Imitrex, Amerge, Axert, Frova, and Zomig. She reports that she has tried these when she did not have a constant headache and they did not work when she took them at first onset nor do they work now. She recently tried Imitrex injections and has tried a nasal spray in the past without improvement. The patient does take Compazine for the nausea and reports that that helps somewhat. She has tried numerous preventative medications as well  including Topamax, Depakote, gabapentin, Tegretol, Dilantin, nortriptyline, propranolol, and verapamil per her report. She has tried physical therapy as well as trigger injections and denies that any of these things are helpful. She does report she takes ibuprofen 800 mg twice per day for ankylosing spondylitis. For her headaches, the only thing she has found that helped is Tylenol with Codeine.       Cervicalgia 05/15/2014     Priority: Medium    Myalgia and myositis 05/15/2014     Priority: Medium    Abnormal pap 07/30/2013     Priority: Medium    Ankylosing spondylitis (H) 05/24/2012     Priority: Medium     Flori AN       Dysthymic disorder 09/13/2010     Priority: Medium    Anxiety state 06/02/2009     Priority: Medium     Problem list name updated by automated process. Provider to review      Depressive disorder, not elsewhere classified 10/31/2007     Priority: Medium    Migraine 01/15/2003     Priority: Medium     Problem list name updated by automated process. 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    Inguinal hernia without mention of obstruction or gangrene, unilateral or unspecified, (not specified as recurrent) 06/16/2008    Irregular menstrual cycle 05/25/2011    Mental disorders of mother, postpartum 10/06/2005    Migraine, unspecified, without mention of intractable migraine without mention of status migrainosus 01/15/2003    Moderate dysplasia of cervix 11/03/2011    Pain in thoracic spine 11/03/2011    Scoliosis (and kyphoscoliosis), idiopathic 05/16/2011  "      Past Surgical History:    Past Surgical History:   Procedure Laterality Date    HEAD & NECK SURGERY      inguinal hernia repair, Germain repair  10/14/2008    RT     TUBAL LIGATION         Family History:    Family History   Problem Relation Age of Onset    Cancer Mother         lymphoma    Cancer Other     Cancer Other     Colon Cancer Sister        Social History:  Marital Status:   [2]  Social History     Tobacco Use    Smoking status: Some Days     Current packs/day: 0.00     Types: Cigarettes, Vaping Device     Last attempt to quit: 2015     Years since quittin.8    Smokeless tobacco: Never    Tobacco comments:     Passive Exposure: Yes    Substance Use Topics    Alcohol use: No    Drug use: No        Medications:    acetaminophen-codeine (TYLENOL/CODEINE #3) 300-30 MG per tablet  clonazePAM (KLONOPIN) 0.5 MG tablet  clorazepate (TRANXENE) 3.75 MG tablet  gabapentin (NEURONTIN) 300 MG capsule  hydrOXYzine (ATARAX) 25 MG tablet  hydrOXYzine (VISTARIL) 25 MG capsule  IBUPROFEN PO  SUMAtriptan (IMITREX) 100 MG tablet  baclofen (LIORESAL) 10 MG tablet  Multiple Vitamins-Minerals (MULTIVITAMIN ADULT PO)  propranolol ER (INDERAL LA) 60 MG 24 hr capsule  TiZANidine HCl (ZANAFLEX PO)  vitamin C (ASCORBIC ACID) 500 MG tablet          Review of Systems   Constitutional: Negative.    HENT: Negative.     Eyes: Negative.    Respiratory: Negative.     Cardiovascular: Negative.    Gastrointestinal: Negative.    Endocrine: Negative.    Genitourinary:         Mass left groin   Musculoskeletal: Negative.    Skin: Negative.    Allergic/Immunologic: Negative.    Neurological: Negative.    Hematological: Negative.    Psychiatric/Behavioral: Negative.         Physical Exam   BP: 126/84  Pulse: 97  Temp: 98.1  F (36.7  C)  Resp: 18  Height: 163.8 cm (5' 4.5\")  Weight: 47.9 kg (105 lb 8 oz)  SpO2: 93 %      GENERAL APPEARANCE:  The patient is a 49 year old well-developed, well-nourished individual that " appears as stated age.    ABDOMEN:  Soft, flat, and benign.  No mass, tenderness, guarding, or rebound.  No organomegaly or hernia.  Bowel sounds are present.  No CVA tenderness or flank mass.  No abdominal bruits or thrills present upon auscultation/palpation.  GENITOURINARY:  Assessment completed with chaperone Ericka Rangel RN present.  Does have mobile mass in the left groin.  No erythema.  No crepitus.  No significant tenderness upon palpation.  PSYCHIATRIC:  The patient is awake, alert, and oriented x4.  Recent and remote memory is intact.  Appropriate mood and affect.  Calm and cooperative with history and physical exam.  SKIN:  Warm, dry, and well perfused.  Good turgor.  No lesions, nodules, or rashes are noted.  No bruising noted.  LYMPHATICS: 1 left groin lymph node noted that is mobile is noted.  No obvious tenderness or erythema upon palpation.    ED Course     ED Course as of 05/20/25 2107   Tue May 20, 2025   2043 Labs ordered.   2044 In to see patient and history/physical completed.    2044 Labs discontinued.          No results found for this or any previous visit (from the past 24 hours).    Medications - No data to display    Assessments & Plan (with Medical Decision Making)     I have reviewed the nursing notes.    I have reviewed the findings, diagnosis, plan and need for follow up with the patient.    Summary:  Patient presents to the ER today left groin mass.  Potential diagnosis which have been considered and evaluated include hernia, abscess, lymphadenopathy, cancer as well as others. Many of these have been excluded using the various modalities and assessment as noted on the chart. At the present time, the diagnosis is left groin lymphadenopathy.  Upon arrival, vitals signs are normal.  The patient is alert and oriented no distress.  Patient has mobile mass in the left groin upon palpation.  No erythema.  No crepitus.  Mass does disappear at times.  Masses hard upon palpation.  At this  time mass consistent with swollen lymph node.  No signs of infection.  At this time further workup is not necessary.  Will have patient try acetaminophen/ibuprofen for pain control.  Warm compresses.  Return if mass remains in place and significant pain or if any other concerns.  Otherwise follow-up with PCP.  Patient verbalized understanding agrees plan of care.  Patient discharged home.      Critical Care Time: None    Impression and plan discussed with patient. Questions answered, concerns addressed, indications for urgent re-evaluation reviewed, and  given. Patient/Parent/Caregiver agree with treatment plan and have no further questions at this time.  AVS provided at discharge.    This document was prepared using a combination of typing and voice generated software.  While every attempt was made for accuracy, spelling and grammatical errors may exist.              Discharge Medication List as of 5/20/2025  9:02 PM          Final diagnoses:   Inguinal lymphadenopathy       5/20/2025   [facility]       Albino Gallardo APRN CNP  05/20/25 8882

## 2025-05-21 NOTE — ED TRIAGE NOTES
Patient self-presents to ED with complaints of LLQ abdominal pain. Patient reports the pain started a week ago, but today it has stayed constant. Patient reports the pain goes away when laying on her back. Patient reports hx of spinal stenosis, RLQ hernia, and ovarian cysts. Denies N/V/D.     Triage Assessment (Adult)       Row Name 05/20/25 2032          Triage Assessment    Airway WDL WDL        Respiratory WDL    Respiratory WDL WDL        Cardiac WDL    Cardiac WDL WDL        Peripheral/Neurovascular WDL    Peripheral Neurovascular WDL WDL     Capillary Refill, General less than/equal to 3 secs        Cognitive/Neuro/Behavioral WDL    Cognitive/Neuro/Behavioral WDL WDL        [place] Coma Scale    Best Eye Response 4-->(E4) spontaneous     Best Motor Response 6-->(M6) obeys commands     Best Verbal Response 5-->(V5) oriented     [place] Coma Scale Score 15

## 2025-05-21 NOTE — DISCHARGE INSTRUCTIONS
Follow-up with your primary care provider in regards to this.  If mass continually sticks out in has severe pain do not hesitate to return.    You can apply warm compresses to the area to see if this helps with the pain.    Pain control:   If your past medical conditions, allergies, current medications, or current status does not prevent you from using acetaminophen and/or ibuprofen, use the following:   Acetaminophen 650-1000 mg every 6 hours as needed for pain in addition to ibuprofen 400-600 mg every 6 hours as needed for pain.  Take these two medications together if wanted.    Remember that these are for AS NEEDED.  If not needed, do not take.         Follow-up with your primary care provider for reevaluation.  Contact your primary care provider if you have any questions or concerns.  Do not hesitate to return to the ER if any new or worsening symptoms.     Please read the attached instructions (if any).  They highlight more specific treatments and interventions for you at home.              Thank you for letting me participate in your care and wish you a fast and uneventful recovery,    Albino KULKARNI CNP    Do not hesitate to contact me with questions or concerns.  [email or web address]

## 2025-05-21 NOTE — ED NOTES
Patient to room 3, settled on cot and call light with in reach.    States she has a lump in her left groining area that aches. 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 She is pushing on it and states it gets more sore the more she pushes on it.

## 2025-06-05 ENCOUNTER — HOSPITAL ENCOUNTER (EMERGENCY)
Facility: HOSPITAL | Age: 50
Discharge: HOME | End: 2025-06-05
Attending: PHYSICIAN ASSISTANT
Payer: COMMERCIAL

## 2025-06-05 VITALS
HEART RATE: 93 BPM | DIASTOLIC BLOOD PRESSURE: 73 MMHG | WEIGHT: 103.5 LBS | BODY MASS INDEX: 17.49 KG/M2 | RESPIRATION RATE: 18 BRPM | OXYGEN SATURATION: 100 % | SYSTOLIC BLOOD PRESSURE: 117 MMHG | TEMPERATURE: 98 F

## 2025-06-05 DIAGNOSIS — M54.50 CHRONIC RIGHT-SIDED LOW BACK PAIN WITHOUT SCIATICA: ICD-10-CM

## 2025-06-05 DIAGNOSIS — G89.29 CHRONIC RIGHT-SIDED LOW BACK PAIN WITHOUT SCIATICA: ICD-10-CM

## 2025-06-05 DIAGNOSIS — R23.4 SCAB: ICD-10-CM

## 2025-06-05 LAB
ANION GAP SERPL CALCULATED.3IONS-SCNC: 11 MMOL/L (ref 7–15)
BASOPHILS # BLD AUTO: 0 10E3/UL (ref 0–0.2)
BASOPHILS NFR BLD AUTO: 1 %
BUN SERPL-MCNC: 17.3 MG/DL (ref 6–20)
CALCIUM SERPL-MCNC: 9.9 MG/DL (ref 8.8–10.4)
CHLORIDE SERPL-SCNC: 102 MMOL/L (ref 98–107)
CREAT SERPL-MCNC: 0.58 MG/DL (ref 0.51–0.95)
EGFRCR SERPLBLD CKD-EPI 2021: >90 ML/MIN/1.73M2
EOSINOPHIL # BLD AUTO: 0.1 10E3/UL (ref 0–0.7)
EOSINOPHIL NFR BLD AUTO: 2 %
ERYTHROCYTE [DISTWIDTH] IN BLOOD BY AUTOMATED COUNT: 12.5 % (ref 10–15)
FLUAV RNA SPEC QL NAA+PROBE: NEGATIVE
FLUBV RNA RESP QL NAA+PROBE: NEGATIVE
GLUCOSE SERPL-MCNC: 93 MG/DL (ref 70–99)
HCO3 SERPL-SCNC: 25 MMOL/L (ref 22–29)
HCT VFR BLD AUTO: 40.6 % (ref 35–47)
HGB BLD-MCNC: 13.4 G/DL (ref 11.7–15.7)
HOLD SPECIMEN: NORMAL
IMM GRANULOCYTES # BLD: 0 10E3/UL
IMM GRANULOCYTES NFR BLD: 0 %
LYMPHOCYTES # BLD AUTO: 2 10E3/UL (ref 0.8–5.3)
LYMPHOCYTES NFR BLD AUTO: 42 %
MCH RBC QN AUTO: 30.2 PG (ref 26.5–33)
MCHC RBC AUTO-ENTMCNC: 33 G/DL (ref 31.5–36.5)
MCV RBC AUTO: 91 FL (ref 78–100)
MONOCYTES # BLD AUTO: 0.3 10E3/UL (ref 0–1.3)
MONOCYTES NFR BLD AUTO: 6 %
NEUTROPHILS # BLD AUTO: 2.4 10E3/UL (ref 1.6–8.3)
NEUTROPHILS NFR BLD AUTO: 49 %
NRBC # BLD AUTO: 0 10E3/UL
NRBC BLD AUTO-RTO: 0 /100
PLATELET # BLD AUTO: 175 10E3/UL (ref 150–450)
POTASSIUM SERPL-SCNC: 3.8 MMOL/L (ref 3.4–5.3)
RBC # BLD AUTO: 4.44 10E6/UL (ref 3.8–5.2)
RSV RNA SPEC NAA+PROBE: NEGATIVE
SARS-COV-2 RNA RESP QL NAA+PROBE: NEGATIVE
SODIUM SERPL-SCNC: 138 MMOL/L (ref 135–145)
WBC # BLD AUTO: 4.8 10E3/UL (ref 4–11)

## 2025-06-05 PROCEDURE — 87637 SARSCOV2&INF A&B&RSV AMP PRB: CPT | Performed by: PHYSICIAN ASSISTANT

## 2025-06-05 PROCEDURE — 80048 BASIC METABOLIC PNL TOTAL CA: CPT | Performed by: PHYSICIAN ASSISTANT

## 2025-06-05 PROCEDURE — 82435 ASSAY OF BLOOD CHLORIDE: CPT | Performed by: PHYSICIAN ASSISTANT

## 2025-06-05 PROCEDURE — 99284 EMERGENCY DEPT VISIT MOD MDM: CPT | Performed by: NURSE PRACTITIONER

## 2025-06-05 PROCEDURE — 85004 AUTOMATED DIFF WBC COUNT: CPT | Performed by: PHYSICIAN ASSISTANT

## 2025-06-05 PROCEDURE — 99283 EMERGENCY DEPT VISIT LOW MDM: CPT

## 2025-06-05 PROCEDURE — 36415 COLL VENOUS BLD VENIPUNCTURE: CPT | Performed by: PHYSICIAN ASSISTANT

## 2025-06-05 PROCEDURE — 85041 AUTOMATED RBC COUNT: CPT | Performed by: PHYSICIAN ASSISTANT

## 2025-06-05 ASSESSMENT — ENCOUNTER SYMPTOMS
ENDOCRINE NEGATIVE: 1
FEVER: 1
PSYCHIATRIC NEGATIVE: 1
FATIGUE: 1
BACK PAIN: 1
CARDIOVASCULAR NEGATIVE: 1
NEUROLOGICAL NEGATIVE: 1
WOUND: 1
GASTROINTESTINAL NEGATIVE: 1
ALLERGIC/IMMUNOLOGIC NEGATIVE: 1
RESPIRATORY NEGATIVE: 1
EYES NEGATIVE: 1
HEMATOLOGIC/LYMPHATIC NEGATIVE: 1

## 2025-06-05 ASSESSMENT — ACTIVITIES OF DAILY LIVING (ADL): ADLS_ACUITY_SCORE: 41

## 2025-06-06 NOTE — ED PROVIDER NOTES
History     Chief Complaint   Patient presents with    Hip Pain     HPI  Venu Amaya is a 49 year old individual with history of anxiety, depressive disorder, ankylosing spondylitis, dysthymic disorder, chronic low back pain on chronic opioids, comes in for right hip pain and left shoulder skin issue.  Patient states that she picked off a black spot on her left neck.  States that the area bled and then swollen up.  Not painful.  Patient states that she then later developed a fever so she is concerned about infection.  Comes in for this reason.  Also has some right low back pain.  States has significant spinal issues but just comes in for evaluation of this.  No loss of range of motion of the legs.  No loss of bowel or bladder control.  States that sitting makes pain worse in the right back/hip area.  No radiation of pain down the leg.    Allergies:  Allergies   Allergen Reactions    Amoxicillin Swelling     Hands swell     Levofloxacin Other (See Comments)     Unknown reaction - Levaquin     Other [No Clinical Screening - See Comments]      utapar drop in blood pressure    Pcn [Penicillins]     Ritodrine        Problem List:    Patient Active Problem List    Diagnosis Date Noted    S/P cervical spinal fusion 03/29/2018     Priority: Medium    Neural foraminal stenosis of cervical spine 10/05/2017     Priority: Medium    Opioid dependence (H) 12/23/2015     Priority: Medium    Long term (current) use of opiate analgesic 12/23/2015     Priority: Medium    Chronic low back pain 11/02/2015     Priority: Medium     Overview:   IMO Update      Headache 10/23/2014     Priority: Medium     Overview:   10/23/14: According to neurology consultation of 9/2/14, previous treatments  include pretty much all of the triptans, Maxalt, Imitrex, Amerge, Axert, Frova, and Zomig. She reports that she has tried these when she did not have a constant headache and they did not work when she took them at first onset nor do they work  now. She recently tried Imitrex injections and has tried a nasal spray in the past without improvement. The patient does take Compazine for the nausea and reports that that helps somewhat. She has tried numerous preventative medications as well including Topamax, Depakote, gabapentin, Tegretol, Dilantin, nortriptyline, propranolol, and verapamil per her report. She has tried physical therapy as well as trigger injections and denies that any of these things are helpful. She does report she takes ibuprofen 800 mg twice per day for ankylosing spondylitis. For her headaches, the only thing she has found that helped is Tylenol with Codeine.       Cervicalgia 05/15/2014     Priority: Medium    Myalgia and myositis 05/15/2014     Priority: Medium    Abnormal pap 07/30/2013     Priority: Medium    Ankylosing spondylitis (H) 05/24/2012     Priority: Medium     Flori Finch PAC       Dysthymic disorder 09/13/2010     Priority: Medium    Anxiety state 06/02/2009     Priority: Medium     Problem list name updated by automated process. Provider to review      Depressive disorder, not elsewhere classified 10/31/2007     Priority: Medium    Migraine 01/15/2003     Priority: Medium     Problem list name updated by automated process. 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    Inguinal hernia without mention of obstruction or gangrene, unilateral or unspecified, (not specified as recurrent) 06/16/2008    Irregular menstrual cycle 05/25/2011    Mental disorders of mother, postpartum 10/06/2005    Migraine,  unspecified, without mention of intractable migraine without mention of status migrainosus 01/15/2003    Moderate dysplasia of cervix 2011    Pain in thoracic spine 2011    Scoliosis (and kyphoscoliosis), idiopathic 2011       Past Surgical History:    Past Surgical History:   Procedure Laterality Date    HEAD & NECK SURGERY      inguinal hernia repair, Germain repair  10/14/2008    RT     TUBAL LIGATION         Family History:    Family History   Problem Relation Age of Onset    Cancer Mother         lymphoma    Cancer Other     Cancer Other     Colon Cancer Sister        Social History:  Marital Status:   [2]  Social History     Tobacco Use    Smoking status: Some Days     Current packs/day: 0.00     Types: Cigarettes, Vaping Device     Last attempt to quit: 2015     Years since quittin.9    Smokeless tobacco: Never    Tobacco comments:     Passive Exposure: Yes    Substance Use Topics    Alcohol use: No    Drug use: No        Medications:    acetaminophen-codeine (TYLENOL/CODEINE #3) 300-30 MG per tablet  baclofen (LIORESAL) 10 MG tablet  clonazePAM (KLONOPIN) 0.5 MG tablet  clorazepate (TRANXENE) 3.75 MG tablet  gabapentin (NEURONTIN) 300 MG capsule  hydrOXYzine (ATARAX) 25 MG tablet  hydrOXYzine (VISTARIL) 25 MG capsule  IBUPROFEN PO  Multiple Vitamins-Minerals (MULTIVITAMIN ADULT PO)  propranolol ER (INDERAL LA) 60 MG 24 hr capsule  SUMAtriptan (IMITREX) 100 MG tablet  TiZANidine HCl (ZANAFLEX PO)  vitamin C (ASCORBIC ACID) 500 MG tablet          Review of Systems   Constitutional:  Positive for fatigue and fever.   HENT: Negative.     Eyes: Negative.    Respiratory: Negative.     Cardiovascular: Negative.    Gastrointestinal: Negative.    Endocrine: Negative.    Genitourinary: Negative.    Musculoskeletal:  Positive for back pain.   Skin:  Positive for wound (Left neck).   Allergic/Immunologic: Negative.    Neurological: Negative.    Hematological: Negative.   "  Psychiatric/Behavioral: Negative.         Physical Exam   BP: 117/73  Pulse: 93  Temp: 98  F (36.7  C)  Resp: 18  Weight: 46.9 kg (103 lb 8 oz)  SpO2: 100 %      GENERAL APPEARANCE:  The patient is a 49 year old well-developed, well-nourished individual that appears as stated age.  NECK:  Supple.  Trachea is midline.  No lymphadenopathy or tenderness.  MUSCULOSKELETAL: Normal gait and station. No kyphosis noted.  No abnormal lordosis present.  No scoliosis noted.  No spinal tenderness, step-offs, or deformities noted upon palpation.  Palpation of paraspinal right lumbar paraspinal muscle around L5 does elicit pain.  Bilateral quadriceps strength 5/5.  Bilateral foot/great toe dorsiflexion strength 5/5.  Bilateral plantar flexion strength 5/5.  NEUROLOGICAL: Sensation to dermatomes for L4/L5/S1 equal and intact.  Bilateral patellar, medial hamstring, and Achilles reflexes equal.  Sensation intact to radha-rectal area.    PSYCHIATRIC:  The patient is awake, alert, and oriented x4.  Recent and remote memory is intact.  Appropriate mood and affect.  Calm and cooperative with history and physical exam.  SKIN:  Warm, dry, and well perfused.  Good turgor.  Pinpoint scab to left neck.  No surrounding erythema.  No toxic striations.  No drainage..       ED Course     ED Course as of 06/05/25 2137   Thu Jun 05, 2025 2032 In to see patient   2045  Patient is concerned because she had a 100F fever earlier today and she feels fatigued and has bodyaches.  Patient would like to get some basic testing to see if she has an \"infection\"   2053 Report given to Albino Gallardo in the emergency department   2115 In to see patient and history/physical completed.    2125 Discussed paraspinal lumbar injection for pain but patient deferred.   2137 No acute findings.  Patient will discharge home to continue previously prescribed medications for pain control.  Wound care as discussed in AVS.  Follow-up recommendations and return precautions " given.                 Results for orders placed or performed during the hospital encounter of 06/05/25 (from the past 24 hours)   Influenza A/B, RSV and SARS-CoV2 PCR (COVID-19) Nasopharyngeal    Specimen: Nasopharyngeal; Swab   Result Value Ref Range    Influenza A PCR Negative Negative    Influenza B PCR Negative Negative    RSV PCR Negative Negative    SARS CoV2 PCR Negative Negative    Narrative    Testing was performed using the Xpert Xpress CoV2/Flu/RSV Assay on the [employer] GeneXpert Instrument. This test should be ordered for the detection of SARS-CoV2, influenza, and RSV viruses in individuals with signs and symptoms of respiratory tract infection. This test is for in vitro diagnostic use under the [place] FDA for laboratories certified under CLIA to perform high or moderate complexity testing. This test has been [place] FDA cleared. A negative result does not rule out the presence of PCR inhibitors in the specimen or target RNA in concentration below the limit of detection for the assay. If only one viral target is positive but coinfection with multiple targets is suspected, the sample should be re-tested with another FDA cleared, approved, or authorized test, if coninfection would change clinical management. This test was validated by the [email or web address]. These laboratories are certified under the Clinical Laboratory Improvement Amendments of 1988 (CLIA-88) as qualified to perfom high complexity laboratory testing.   CBC with platelets differential    Narrative    The following orders were created for panel order CBC with platelets differential.  Procedure                               Abnormality         Status                     ---------                               -----------         ------                     CBC with platelets and ...[[id number]]                      Final result                 Please view results for these tests on the individual orders.   Basic metabolic panel   Result  Value Ref Range    Sodium 138 135 - 145 mmol/L    Potassium 3.8 3.4 - 5.3 mmol/L    Chloride 102 98 - 107 mmol/L    Carbon Dioxide (CO2) 25 22 - 29 mmol/L    Anion Gap 11 7 - 15 mmol/L    Urea Nitrogen 17.3 6.0 - 20.0 mg/dL    Creatinine 0.58 0.51 - 0.95 mg/dL    GFR Estimate >90 >60 mL/min/1.73m2    Calcium 9.9 8.8 - 10.4 mg/dL    Glucose 93 70 - 99 mg/dL   CBC with platelets and differential   Result Value Ref Range    WBC Count 4.8 4.0 - 11.0 10e3/uL    RBC Count 4.44 3.80 - 5.20 10e6/uL    Hemoglobin 13.4 11.7 - 15.7 g/dL    Hematocrit 40.6 35.0 - 47.0 %    MCV 91 78 - 100 fL    MCH 30.2 26.5 - 33.0 pg    MCHC 33.0 31.5 - 36.5 g/dL    RDW 12.5 10.0 - 15.0 %    Platelet Count 175 150 - 450 10e3/uL    % Neutrophils 49 %    % Lymphocytes 42 %    % Monocytes 6 %    % Eosinophils 2 %    % Basophils 1 %    % Immature Granulocytes 0 %    NRBCs per 100 WBC 0 <1 /100    Absolute Neutrophils 2.4 1.6 - 8.3 10e3/uL    Absolute Lymphocytes 2.0 0.8 - 5.3 10e3/uL    Absolute Monocytes 0.3 0.0 - 1.3 10e3/uL    Absolute Eosinophils 0.1 0.0 - 0.7 10e3/uL    Absolute Basophils 0.0 0.0 - 0.2 10e3/uL    Absolute Immature Granulocytes 0.0 <=0.4 10e3/uL    Absolute NRBCs 0.0 10e3/uL   Extra Tube    Narrative    The following orders were created for panel order Extra Tube.  Procedure                               Abnormality         Status                     ---------                               -----------         ------                     Extra Blue Top Tube[[id number]]                             In process                 Extra Red Top Tube[[id number]]                              In process                 Extra Heparinized Syringe[[id number]]                       In process                   Please view results for these tests on the individual orders.       Medications - No data to display    Assessments & Plan (with Medical Decision Making)     I have reviewed the nursing notes.    I have reviewed the findings, diagnosis,  plan and need for follow up with the patient.    Summary:  Patient presents to the ER today for right low back pain and left neck skin issue.  Potential diagnosis which have been considered and evaluated include cauda equina, impingement, sciatica, ureteral stone, back strain, infected wound, noninfected wound, as well as others. Many of these have been excluded using the various modalities and assessment as noted on the chart. At the present time, the diagnosis is chronic right low back pain, scab to the left neck without signs of infection.  Upon arrival, vitals signs are normal.  The patient is alert and oriented no distress.  Physical examination does show right sided lumbar paraspinal tenderness to palpation around L5 area.  No crepitus or deformities.  No mid spinal tenderness, step-offs, deformities.  No saddle paresthesias, leg weakness present.  No loss of bowel or bladder control per patient.  Also has scab to left neck with no signs of infection.  Lab work was obtained prior to this provider which showed WBC of 4.8 with hemoglobin of 13.4.  Electrolytes and renal functions normal.  Influenza, COVID, RSV negative.  Did offer patient paraspinal lumbar trigger point injection but this was deferred.  Patient will go home and continue her previously prescribed medications.  Trigger point injection but this was deferred.  Patient will go home and continue her previously prescribed medications.  Close follow-up with PCP recommended.  Return to ER if noticing symptoms.  Patient verbalized understanding agrees with plan of care.  Patient discharged home.      Critical Care Time: None    Impression and plan discussed with patient. Questions answered, concerns addressed, indications for urgent re-evaluation reviewed, and  given. Patient/Parent/Caregiver agree with treatment plan and have no further questions at this time.  AVS provided at discharge.    This document was prepared using a combination of typing  and voice generated software.  While every attempt was made for accuracy, spelling and grammatical errors may exist.              New Prescriptions    No medications on file       Final diagnoses:   Chronic right-sided low back pain without sciatica   Scab       6/5/2025   [facility]       Albino Gallardo APRN CNP  06/05/25 1593

## 2025-06-06 NOTE — DISCHARGE INSTRUCTIONS
Wound care:   Wash your wound 2 times each day with soap and water.  After this, apply antibiotic ointment and a dressing (such as a Band-Aid) for 2-3 days.  Otherwise, keep your wound clean and dry.  This means no swimming or extended submersion in water until the wound is completely healed.  Remember, all wounds will leave some sort of scar.    Signs of infection:   Watch for signs of infection which include severe pain at site, increasing redness with pain, pus colored drainage, or if you develop a fever.  If this occurs, come back in for re-evaluation and most likely antibiotic therapy.         Pain control:   If your past medical conditions, allergies, current medications, or current status does not prevent you from using acetaminophen and/or ibuprofen, use the following:   Acetaminophen 650-1000 mg every 6 hours as needed for pain in addition to ibuprofen 400-600 mg every 6 hours as needed for pain.  Take these two medications together if wanted.    Remember that these are for AS NEEDED.  If not needed, do not take.            Follow-up with your primary care provider for reevaluation.  Contact your primary care provider if you have any questions or concerns.  Do not hesitate to return to the ER if any new or worsening symptoms.     Please read the attached instructions (if any).  They highlight more specific treatments and interventions for you at home.              Thank you for letting me participate in your care and wish you a fast and uneventful recovery,    Albino KULKARNI CNP    Do not hesitate to contact me with questions or concerns.  [email or web address]

## 2025-06-06 NOTE — ED TRIAGE NOTES
Patient here for 2 reasons:  Right hip soreness.   Mole that she pulled off- States she saw something shiny on her left shoulder yesterday so she pulled if off and now has a pinpoint bump there     Triage Assessment (Adult)       Row Name 06/05/25 2025          Triage Assessment    Airway WDL WDL        Respiratory WDL    Respiratory WDL WDL        Skin Circulation/Temperature WDL    Skin Circulation/Temperature WDL WDL        Cardiac WDL    Cardiac WDL WDL        Peripheral/Neurovascular WDL    Peripheral Neurovascular WDL WDL        Cognitive/Neuro/Behavioral WDL    Cognitive/Neuro/Behavioral WDL WDL

## 2025-07-27 ENCOUNTER — HOSPITAL ENCOUNTER (EMERGENCY)
Facility: HOSPITAL | Age: 50
Discharge: HOME | End: 2025-07-27
Attending: EMERGENCY MEDICINE
Payer: COMMERCIAL

## 2025-07-27 VITALS
SYSTOLIC BLOOD PRESSURE: 105 MMHG | TEMPERATURE: 98.3 F | DIASTOLIC BLOOD PRESSURE: 74 MMHG | HEART RATE: 86 BPM | OXYGEN SATURATION: 99 % | RESPIRATION RATE: 18 BRPM

## 2025-07-27 DIAGNOSIS — R10.84 GENERALIZED ABDOMINAL PAIN: Primary | ICD-10-CM

## 2025-07-27 LAB
ALBUMIN SERPL BCG-MCNC: 4.7 G/DL (ref 3.5–5.2)
ALP SERPL-CCNC: 70 U/L (ref 40–150)
ALT SERPL W P-5'-P-CCNC: 24 U/L (ref 0–50)
ANION GAP SERPL CALCULATED.3IONS-SCNC: 14 MMOL/L (ref 7–15)
AST SERPL W P-5'-P-CCNC: 27 U/L (ref 0–45)
BASOPHILS # BLD AUTO: 0 10E3/UL (ref 0–0.2)
BASOPHILS NFR BLD AUTO: 1 %
BILIRUB SERPL-MCNC: 0.4 MG/DL
BUN SERPL-MCNC: 15.7 MG/DL (ref 6–20)
CALCIUM SERPL-MCNC: 10.1 MG/DL (ref 8.8–10.4)
CHLORIDE SERPL-SCNC: 102 MMOL/L (ref 98–107)
CREAT SERPL-MCNC: 0.6 MG/DL (ref 0.51–0.95)
EGFRCR SERPLBLD CKD-EPI 2021: >90 ML/MIN/1.73M2
EOSINOPHIL # BLD AUTO: 0.1 10E3/UL (ref 0–0.7)
EOSINOPHIL NFR BLD AUTO: 3 %
ERYTHROCYTE [DISTWIDTH] IN BLOOD BY AUTOMATED COUNT: 12.3 % (ref 10–15)
GLUCOSE SERPL-MCNC: 81 MG/DL (ref 70–99)
HCO3 SERPL-SCNC: 25 MMOL/L (ref 22–29)
HCT VFR BLD AUTO: 42.7 % (ref 35–47)
HGB BLD-MCNC: 14 G/DL (ref 11.7–15.7)
HOLD SPECIMEN: NORMAL
IMM GRANULOCYTES # BLD: 0 10E3/UL
IMM GRANULOCYTES NFR BLD: 0 %
LIPASE SERPL-CCNC: 40 U/L (ref 13–60)
LYMPHOCYTES # BLD AUTO: 1.3 10E3/UL (ref 0.8–5.3)
LYMPHOCYTES NFR BLD AUTO: 29 %
MCH RBC QN AUTO: 29.7 PG (ref 26.5–33)
MCHC RBC AUTO-ENTMCNC: 32.8 G/DL (ref 31.5–36.5)
MCV RBC AUTO: 91 FL (ref 78–100)
MONOCYTES # BLD AUTO: 0.3 10E3/UL (ref 0–1.3)
MONOCYTES NFR BLD AUTO: 7 %
NEUTROPHILS # BLD AUTO: 2.8 10E3/UL (ref 1.6–8.3)
NEUTROPHILS NFR BLD AUTO: 61 %
NRBC # BLD AUTO: 0 10E3/UL
NRBC BLD AUTO-RTO: 0 /100
PLATELET # BLD AUTO: 164 10E3/UL (ref 150–450)
POTASSIUM SERPL-SCNC: 3.8 MMOL/L (ref 3.4–5.3)
PROT SERPL-MCNC: 7.2 G/DL (ref 6.4–8.3)
RBC # BLD AUTO: 4.71 10E6/UL (ref 3.8–5.2)
SODIUM SERPL-SCNC: 141 MMOL/L (ref 135–145)
WBC # BLD AUTO: 4.5 10E3/UL (ref 4–11)

## 2025-07-27 PROCEDURE — 99283 EMERGENCY DEPT VISIT LOW MDM: CPT | Performed by: EMERGENCY MEDICINE

## 2025-07-27 PROCEDURE — 84155 ASSAY OF PROTEIN SERUM: CPT | Performed by: EMERGENCY MEDICINE

## 2025-07-27 PROCEDURE — 84075 ASSAY ALKALINE PHOSPHATASE: CPT | Performed by: EMERGENCY MEDICINE

## 2025-07-27 PROCEDURE — 83690 ASSAY OF LIPASE: CPT | Performed by: EMERGENCY MEDICINE

## 2025-07-27 PROCEDURE — 85025 COMPLETE CBC W/AUTO DIFF WBC: CPT | Performed by: EMERGENCY MEDICINE

## 2025-07-27 PROCEDURE — 99284 EMERGENCY DEPT VISIT MOD MDM: CPT | Performed by: EMERGENCY MEDICINE

## 2025-07-27 PROCEDURE — 36415 COLL VENOUS BLD VENIPUNCTURE: CPT | Performed by: EMERGENCY MEDICINE

## 2025-07-27 ASSESSMENT — COLUMBIA-SUICIDE SEVERITY RATING SCALE - C-SSRS
1. IN THE PAST MONTH, HAVE YOU WISHED YOU WERE DEAD OR WISHED YOU COULD GO TO SLEEP AND NOT WAKE UP?: NO
6. HAVE YOU EVER DONE ANYTHING, STARTED TO DO ANYTHING, OR PREPARED TO DO ANYTHING TO END YOUR LIFE?: NO
2. HAVE YOU ACTUALLY HAD ANY THOUGHTS OF KILLING YOURSELF IN THE PAST MONTH?: NO

## 2025-07-27 ASSESSMENT — ACTIVITIES OF DAILY LIVING (ADL)
ADLS_ACUITY_SCORE: 41

## 2025-07-27 NOTE — ED TRIAGE NOTES
Patient presents with c/o stomach irritation since Tuesday, but Friday was the worse pain. Reports generalized abdominal pain. Yesterday last BM, denies any urinary symptoms. Denies any blood in urine or stool. Dneies any fevers. Denies any nausea or emesis.

## 2025-07-27 NOTE — ED PROVIDER NOTES
History     Chief Complaint   Patient presents with     Abdominal Pain     HPI  Venu Amaya is a 49 year old female who ***    Allergies:  Allergies   Allergen Reactions     Amoxicillin Swelling     Hands swell      Levofloxacin Other (See Comments)     Unknown reaction - Levaquin      Other [No Clinical Screening - See Comments]      utapar drop in blood pressure     Pcn [Penicillins]      Ritodrine        Problem List:    Patient Active Problem List    Diagnosis Date Noted     S/P cervical spinal fusion 03/29/2018     Priority: Medium     Neural foraminal stenosis of cervical spine 10/05/2017     Priority: Medium     Opioid dependence (H) 12/23/2015     Priority: Medium     Long term (current) use of opiate analgesic 12/23/2015     Priority: Medium     Chronic low back pain 11/02/2015     Priority: Medium     Overview:   IMO Update       Headache 10/23/2014     Priority: Medium     Overview:   10/23/14: According to neurology consultation of 9/2/14, previous treatments  include pretty much all of the triptans, Maxalt, Imitrex, Amerge, Axert, Frova, and Zomig. She reports that she has tried these when she did not have a constant headache and they did not work when she took them at first onset nor do they work now. She recently tried Imitrex injections and has tried a nasal spray in the past without improvement. The patient does take Compazine for the nausea and reports that that helps somewhat. She has tried numerous preventative medications as well including Topamax, Depakote, gabapentin, Tegretol, Dilantin, nortriptyline, propranolol, and verapamil per her report. She has tried physical therapy as well as trigger injections and denies that any of these things are helpful. She does report she takes ibuprofen 800 mg twice per day for ankylosing spondylitis. For her headaches, the only thing she has found that helped is Tylenol with Codeine.        Cervicalgia 05/15/2014     Priority: Medium     Myalgia and  myositis 05/15/2014     Priority: Medium     Abnormal pap 07/30/2013     Priority: Medium     Ankylosing spondylitis (H) 05/24/2012     Priority: Medium     Flori AN        Dysthymic disorder 09/13/2010     Priority: Medium     Anxiety state 06/02/2009     Priority: Medium     Problem list name updated by automated process. Provider to review       Depressive disorder, not elsewhere classified 10/31/2007     Priority: Medium     Migraine 01/15/2003     Priority: Medium     Problem list name updated by automated process. Provider to review          Past Medical History:    Past Medical History:   Diagnosis Date     Abnormal Pap smear and cervical HPV (human papillo 05/25/2011     Ankylosing spondylitis (H) 05/24/2012     Anxiety state, unspecified 06/02/2009     Cervicalgia pain (neck) 11/03/2011     Chronic Headache disorder  05/25/2011     Depressive disorder, not elsewhere classified 10/31/2007     Dysmenorrhea 04/03/2012     Habitual aborter, antepartum condition or complica 02/10/2005     Inguinal hernia without mention of obstruction or gangrene, unilateral or unspecified, (not specified as recurrent) 06/16/2008     Irregular menstrual cycle 05/25/2011     Mental disorders of mother, postpartum 10/06/2005     Migraine, unspecified, without mention of intractable migraine without mention of status migrainosus 01/15/2003     Moderate dysplasia of cervix 11/03/2011     Pain in thoracic spine 11/03/2011     Scoliosis (and kyphoscoliosis), idiopathic 05/16/2011       Past Surgical History:    Past Surgical History:   Procedure Laterality Date     HEAD & NECK SURGERY       inguinal hernia repair, Germain repair  10/14/2008    RT      TUBAL LIGATION         Family History:    Family History   Problem Relation Age of Onset     Cancer Mother         lymphoma     Cancer Other      Cancer Other      Colon Cancer Sister        Social History:  Marital Status:   [2]  Social History     Tobacco Use  "    Smoking status: Some Days     Current packs/day: 0.00     Types: Cigarettes, Vaping Device     Last attempt to quit: 7/13/2015     Years since quitting: 10.0     Smokeless tobacco: Never     Tobacco comments:     Passive Exposure: Yes    Substance Use Topics     Alcohol use: No     Drug use: No        Medications:    acetaminophen-codeine (TYLENOL/CODEINE #3) 300-30 MG per tablet  baclofen (LIORESAL) 10 MG tablet  clonazePAM (KLONOPIN) 0.5 MG tablet  clorazepate (TRANXENE) 3.75 MG tablet  gabapentin (NEURONTIN) 300 MG capsule  hydrOXYzine (ATARAX) 25 MG tablet  hydrOXYzine (VISTARIL) 25 MG capsule  IBUPROFEN PO  Multiple Vitamins-Minerals (MULTIVITAMIN ADULT PO)  propranolol ER (INDERAL LA) 60 MG 24 hr capsule  SUMAtriptan (IMITREX) 100 MG tablet  TiZANidine HCl (ZANAFLEX PO)  vitamin C (ASCORBIC ACID) 500 MG tablet          Review of Systems    Physical Exam   BP: 122/81  Pulse: 90  Temp: 98  F (36.7  C)  Resp: 16  SpO2: 100 %      Physical Exam    ED Course        Procedures         {EKG done?:759096}    Critical Care time:  {none or minutes:890527}  {Trauma Activation or Fall?:217748}  {Sepsis/Septic Shock/Stemi/Stroke:263429::\"None\"}         No results found for this or any previous visit (from the past 24 hours).    Medications - No data to display    Assessments & Plan (with Medical Decision Making)     I have reviewed the nursing notes.    I have reviewed the findings, diagnosis, plan and need for follow up with the patient.  {ED Addendum:800121::\" \"}        Medical Decision Making  The patient's presentation was of {[facility] Problem:134873}.    The patient's evaluation involved:  {[facility] Data:706762}    The patient's management necessitated {[facility] Management:506674}.        New Prescriptions    No medications on file       Final diagnoses:   None       7/27/2025   [facility]    " normal.      Palpations: Abdomen is soft.      Tenderness: There is no abdominal tenderness.   Musculoskeletal:         General: No tenderness. Normal range of motion.      Cervical back: Normal range of motion and neck supple. No tenderness.      Thoracic back: No tenderness.      Lumbar back: No tenderness.   Skin:     General: Skin is warm and dry.      Findings: No abrasion, laceration or rash.   Neurological:      Mental Status: She is alert and oriented to person, place, and time.         ED Course        Procedures         No results found for this or any previous visit (from the past 24 hours).    Medications - No data to display    Assessments & Plan (with Medical Decision Making)     I have reviewed the nursing notes.    I have reviewed the findings, diagnosis, plan and need for follow up with the patient    Medical Decision Making  The patient's presentation was of moderate complexity (an acute illness with systemic symptoms).    The patient's evaluation involved:  review of external note(s) from 1 sources (see separate area of note for details)  review of 1 test result(s) ordered prior to this encounter (see separate area of note for details)  ordering and/or review of 3+ test(s) in this encounter (labs)and CT abd/pelvis was considered but felt not indicated.    The patient's management necessitated moderate risk (labs in setting of abdominal pain).    Patient appears well at time of evaluation. Labs were normal and patient was given reassurance, to return if symptoms worsen. At this point I did not see an indication for imaging given normal labs and no pain at time of evaluation.    Discharge Medication List as of 7/27/2025 11:50 AM          Final diagnoses:   Generalized abdominal pain       7/27/2025   [facility]       Gagan Mayo DO  08/20/25 0904

## 2025-08-20 ASSESSMENT — ENCOUNTER SYMPTOMS
NAUSEA: 0
CHILLS: 0
ABDOMINAL PAIN: 1
VOMITING: 0
FEVER: 0